# Patient Record
Sex: FEMALE | Race: WHITE | NOT HISPANIC OR LATINO | Employment: UNEMPLOYED | URBAN - NONMETROPOLITAN AREA
[De-identification: names, ages, dates, MRNs, and addresses within clinical notes are randomized per-mention and may not be internally consistent; named-entity substitution may affect disease eponyms.]

---

## 2022-07-22 ENCOUNTER — HOSPITAL ENCOUNTER (INPATIENT)
Facility: HOSPITAL | Age: 72
LOS: 7 days | Discharge: NURSING, REHAB OR LONG-TERM CARE | End: 2022-07-30
Attending: STUDENT IN AN ORGANIZED HEALTH CARE EDUCATION/TRAINING PROGRAM | Admitting: HOSPITALIST

## 2022-07-22 DIAGNOSIS — M46.28 OSTEOMYELITIS OF SACRUM: ICD-10-CM

## 2022-07-22 DIAGNOSIS — L89.154 PRESSURE INJURY OF SACRAL REGION, STAGE 4: ICD-10-CM

## 2022-07-22 DIAGNOSIS — L89.94 PRESSURE INJURY, STAGE 4, WITH INFECTION: ICD-10-CM

## 2022-07-22 DIAGNOSIS — A41.9 SEPSIS, DUE TO UNSPECIFIED ORGANISM, UNSPECIFIED WHETHER ACUTE ORGAN DYSFUNCTION PRESENT: Primary | ICD-10-CM

## 2022-07-22 DIAGNOSIS — L08.9 PRESSURE INJURY, STAGE 4, WITH INFECTION: ICD-10-CM

## 2022-07-22 LAB
ERYTHROCYTE [SEDIMENTATION RATE] IN BLOOD: 128 MM/HR (ref 0–30)
FLUAV RNA RESP QL NAA+PROBE: NOT DETECTED
FLUBV RNA RESP QL NAA+PROBE: NOT DETECTED
SARS-COV-2 RNA RESP QL NAA+PROBE: NOT DETECTED

## 2022-07-22 PROCEDURE — 87636 SARSCOV2 & INF A&B AMP PRB: CPT | Performed by: PHYSICIAN ASSISTANT

## 2022-07-22 PROCEDURE — 36415 COLL VENOUS BLD VENIPUNCTURE: CPT

## 2022-07-22 PROCEDURE — 83036 HEMOGLOBIN GLYCOSYLATED A1C: CPT | Performed by: HOSPITALIST

## 2022-07-22 PROCEDURE — 85007 BL SMEAR W/DIFF WBC COUNT: CPT | Performed by: PHYSICIAN ASSISTANT

## 2022-07-22 PROCEDURE — 99283 EMERGENCY DEPT VISIT LOW MDM: CPT

## 2022-07-22 PROCEDURE — 99223 1ST HOSP IP/OBS HIGH 75: CPT | Performed by: HOSPITALIST

## 2022-07-22 PROCEDURE — 85025 COMPLETE CBC W/AUTO DIFF WBC: CPT | Performed by: PHYSICIAN ASSISTANT

## 2022-07-22 PROCEDURE — 80053 COMPREHEN METABOLIC PANEL: CPT | Performed by: PHYSICIAN ASSISTANT

## 2022-07-22 PROCEDURE — 86140 C-REACTIVE PROTEIN: CPT | Performed by: PHYSICIAN ASSISTANT

## 2022-07-22 PROCEDURE — 85652 RBC SED RATE AUTOMATED: CPT | Performed by: PHYSICIAN ASSISTANT

## 2022-07-23 ENCOUNTER — APPOINTMENT (OUTPATIENT)
Dept: CT IMAGING | Facility: HOSPITAL | Age: 72
End: 2022-07-23

## 2022-07-23 ENCOUNTER — ANESTHESIA (OUTPATIENT)
Dept: PERIOP | Facility: HOSPITAL | Age: 72
End: 2022-07-23

## 2022-07-23 ENCOUNTER — ANESTHESIA EVENT (OUTPATIENT)
Dept: PERIOP | Facility: HOSPITAL | Age: 72
End: 2022-07-23

## 2022-07-23 PROBLEM — L89.90 INFECTED DECUBITUS ULCER: Status: ACTIVE | Noted: 2022-07-23

## 2022-07-23 PROBLEM — L08.9 INFECTED DECUBITUS ULCER: Status: ACTIVE | Noted: 2022-07-23

## 2022-07-23 LAB
ALBUMIN SERPL-MCNC: 1.57 G/DL (ref 3.5–5.2)
ALBUMIN SERPL-MCNC: 2.13 G/DL (ref 3.5–5.2)
ALBUMIN/GLOB SERPL: 0.4 G/DL
ALBUMIN/GLOB SERPL: 0.5 G/DL
ALP SERPL-CCNC: 117 U/L (ref 39–117)
ALP SERPL-CCNC: 155 U/L (ref 39–117)
ALT SERPL W P-5'-P-CCNC: 25 U/L (ref 1–33)
ALT SERPL W P-5'-P-CCNC: 31 U/L (ref 1–33)
ANION GAP SERPL CALCULATED.3IONS-SCNC: 13.3 MMOL/L (ref 5–15)
ANION GAP SERPL CALCULATED.3IONS-SCNC: 9.6 MMOL/L (ref 5–15)
AST SERPL-CCNC: 38 U/L (ref 1–32)
AST SERPL-CCNC: 54 U/L (ref 1–32)
BACTERIA UR QL AUTO: ABNORMAL /HPF
BASOPHILS # BLD AUTO: 0.1 10*3/MM3 (ref 0–0.2)
BASOPHILS NFR BLD AUTO: 0.5 % (ref 0–1.5)
BILIRUB SERPL-MCNC: 0.7 MG/DL (ref 0–1.2)
BILIRUB SERPL-MCNC: 0.8 MG/DL (ref 0–1.2)
BILIRUB UR QL STRIP: NEGATIVE
BUN SERPL-MCNC: 27 MG/DL (ref 8–23)
BUN SERPL-MCNC: 28 MG/DL (ref 8–23)
BUN/CREAT SERPL: 32.1 (ref 7–25)
BUN/CREAT SERPL: 35 (ref 7–25)
CALCIUM SPEC-SCNC: 8.2 MG/DL (ref 8.6–10.5)
CALCIUM SPEC-SCNC: 9.4 MG/DL (ref 8.6–10.5)
CHLORIDE SERPL-SCNC: 101 MMOL/L (ref 98–107)
CHLORIDE SERPL-SCNC: 95 MMOL/L (ref 98–107)
CLARITY UR: ABNORMAL
CO2 SERPL-SCNC: 19.4 MMOL/L (ref 22–29)
CO2 SERPL-SCNC: 19.7 MMOL/L (ref 22–29)
COLOR UR: ABNORMAL
CORTIS SERPL-MCNC: 18.82 MCG/DL
CREAT SERPL-MCNC: 0.8 MG/DL (ref 0.57–1)
CREAT SERPL-MCNC: 0.84 MG/DL (ref 0.57–1)
CRP SERPL-MCNC: 13.58 MG/DL (ref 0–0.5)
CRP SERPL-MCNC: 18.07 MG/DL (ref 0–0.5)
D-LACTATE SERPL-SCNC: 1.8 MMOL/L (ref 0.5–2)
D-LACTATE SERPL-SCNC: 2.4 MMOL/L (ref 0.5–2)
DEPRECATED RDW RBC AUTO: 45.6 FL (ref 37–54)
DEPRECATED RDW RBC AUTO: 49.6 FL (ref 37–54)
EGFRCR SERPLBLD CKD-EPI 2021: 74.9 ML/MIN/1.73
EGFRCR SERPLBLD CKD-EPI 2021: 79.4 ML/MIN/1.73
EOSINOPHIL # BLD AUTO: 0.04 10*3/MM3 (ref 0–0.4)
EOSINOPHIL NFR BLD AUTO: 0.2 % (ref 0.3–6.2)
ERYTHROCYTE [DISTWIDTH] IN BLOOD BY AUTOMATED COUNT: 13.4 % (ref 12.3–15.4)
ERYTHROCYTE [DISTWIDTH] IN BLOOD BY AUTOMATED COUNT: 13.5 % (ref 12.3–15.4)
GLOBULIN UR ELPH-MCNC: 3.6 GM/DL
GLOBULIN UR ELPH-MCNC: 4.7 GM/DL
GLUCOSE BLDC GLUCOMTR-MCNC: 123 MG/DL (ref 70–130)
GLUCOSE BLDC GLUCOMTR-MCNC: 125 MG/DL (ref 70–130)
GLUCOSE BLDC GLUCOMTR-MCNC: 172 MG/DL (ref 70–130)
GLUCOSE BLDC GLUCOMTR-MCNC: 248 MG/DL (ref 70–130)
GLUCOSE SERPL-MCNC: 253 MG/DL (ref 65–99)
GLUCOSE SERPL-MCNC: 257 MG/DL (ref 65–99)
GLUCOSE UR STRIP-MCNC: ABNORMAL MG/DL
HBA1C MFR BLD: 7.2 % (ref 4.8–5.6)
HCT VFR BLD AUTO: 30.5 % (ref 34–46.6)
HCT VFR BLD AUTO: 32.9 % (ref 34–46.6)
HGB BLD-MCNC: 10.7 G/DL (ref 12–15.9)
HGB BLD-MCNC: 9.1 G/DL (ref 12–15.9)
HGB UR QL STRIP.AUTO: ABNORMAL
HOLD SPECIMEN: NORMAL
HOLD SPECIMEN: NORMAL
HYALINE CASTS UR QL AUTO: ABNORMAL /LPF
HYPOCHROMIA BLD QL: ABNORMAL
IMM GRANULOCYTES # BLD AUTO: 0.46 10*3/MM3 (ref 0–0.05)
IMM GRANULOCYTES NFR BLD AUTO: 2.2 % (ref 0–0.5)
KETONES UR QL STRIP: ABNORMAL
LARGE PLATELETS: ABNORMAL
LEUKOCYTE ESTERASE UR QL STRIP.AUTO: ABNORMAL
LYMPHOCYTES # BLD AUTO: 1.99 10*3/MM3 (ref 0.7–3.1)
LYMPHOCYTES # BLD MANUAL: 2.43 10*3/MM3 (ref 0.7–3.1)
LYMPHOCYTES NFR BLD AUTO: 9.4 % (ref 19.6–45.3)
LYMPHOCYTES NFR BLD MANUAL: 2 % (ref 5–12)
MCH RBC QN AUTO: 29.8 PG (ref 26.6–33)
MCH RBC QN AUTO: 30.4 PG (ref 26.6–33)
MCHC RBC AUTO-ENTMCNC: 29.8 G/DL (ref 31.5–35.7)
MCHC RBC AUTO-ENTMCNC: 32.5 G/DL (ref 31.5–35.7)
MCV RBC AUTO: 100 FL (ref 79–97)
MCV RBC AUTO: 93.5 FL (ref 79–97)
METAMYELOCYTES NFR BLD MANUAL: 1 % (ref 0–0)
MONOCYTES # BLD AUTO: 1.1 10*3/MM3 (ref 0.1–0.9)
MONOCYTES # BLD: 0.54 10*3/MM3 (ref 0.1–0.9)
MONOCYTES NFR BLD AUTO: 5.2 % (ref 5–12)
NEUTROPHILS # BLD AUTO: 23.72 10*3/MM3 (ref 1.7–7)
NEUTROPHILS NFR BLD AUTO: 17.42 10*3/MM3 (ref 1.7–7)
NEUTROPHILS NFR BLD AUTO: 82.5 % (ref 42.7–76)
NEUTROPHILS NFR BLD MANUAL: 77 % (ref 42.7–76)
NEUTS BAND NFR BLD MANUAL: 11 % (ref 0–5)
NEUTS VAC BLD QL SMEAR: ABNORMAL
NITRITE UR QL STRIP: POSITIVE
NRBC BLD AUTO-RTO: 0 /100 WBC (ref 0–0.2)
NRBC SPEC MANUAL: 2 /100 WBC (ref 0–0.2)
PH UR STRIP.AUTO: <=5 [PH] (ref 5–8)
PLATELET # BLD AUTO: 370 10*3/MM3 (ref 140–450)
PLATELET # BLD AUTO: 541 10*3/MM3 (ref 140–450)
PMV BLD AUTO: 10.5 FL (ref 6–12)
PMV BLD AUTO: 10.6 FL (ref 6–12)
POLYCHROMASIA BLD QL SMEAR: ABNORMAL
POTASSIUM SERPL-SCNC: 4.8 MMOL/L (ref 3.5–5.2)
POTASSIUM SERPL-SCNC: 5.4 MMOL/L (ref 3.5–5.2)
PROT SERPL-MCNC: 5.2 G/DL (ref 6–8.5)
PROT SERPL-MCNC: 6.8 G/DL (ref 6–8.5)
PROT UR QL STRIP: ABNORMAL
RBC # BLD AUTO: 3.05 10*6/MM3 (ref 3.77–5.28)
RBC # BLD AUTO: 3.52 10*6/MM3 (ref 3.77–5.28)
RBC # UR STRIP: ABNORMAL /HPF
REF LAB TEST METHOD: ABNORMAL
SCAN SLIDE: NORMAL
SODIUM SERPL-SCNC: 128 MMOL/L (ref 136–145)
SODIUM SERPL-SCNC: 130 MMOL/L (ref 136–145)
SP GR UR STRIP: 1.02 (ref 1–1.03)
SQUAMOUS #/AREA URNS HPF: ABNORMAL /HPF
TOXIC GRANULATION: ABNORMAL
UROBILINOGEN UR QL STRIP: ABNORMAL
VARIANT LYMPHS NFR BLD MANUAL: 9 % (ref 19.6–45.3)
WBC # UR STRIP: ABNORMAL /HPF
WBC NRBC COR # BLD: 21.11 10*3/MM3 (ref 3.4–10.8)
WBC NRBC COR # BLD: 26.95 10*3/MM3 (ref 3.4–10.8)
WHOLE BLOOD HOLD COAG: NORMAL
WHOLE BLOOD HOLD SPECIMEN: NORMAL

## 2022-07-23 PROCEDURE — 87070 CULTURE OTHR SPECIMN AEROBIC: CPT | Performed by: SURGERY

## 2022-07-23 PROCEDURE — 74176 CT ABD & PELVIS W/O CONTRAST: CPT

## 2022-07-23 PROCEDURE — 25010000002 PIPERACILLIN SOD-TAZOBACTAM PER 1 G: Performed by: HOSPITALIST

## 2022-07-23 PROCEDURE — 11043 DBRDMT MUSC&/FSCA 1ST 20/<: CPT | Performed by: SURGERY

## 2022-07-23 PROCEDURE — 25010000002 VANCOMYCIN 5 G RECONSTITUTED SOLUTION: Performed by: STUDENT IN AN ORGANIZED HEALTH CARE EDUCATION/TRAINING PROGRAM

## 2022-07-23 PROCEDURE — 25010000002 VANCOMYCIN 5 G RECONSTITUTED SOLUTION: Performed by: SURGERY

## 2022-07-23 PROCEDURE — 87205 SMEAR GRAM STAIN: CPT | Performed by: SURGERY

## 2022-07-23 PROCEDURE — 0JB70ZZ EXCISION OF BACK SUBCUTANEOUS TISSUE AND FASCIA, OPEN APPROACH: ICD-10-PCS | Performed by: SURGERY

## 2022-07-23 PROCEDURE — 99222 1ST HOSP IP/OBS MODERATE 55: CPT | Performed by: NURSE PRACTITIONER

## 2022-07-23 PROCEDURE — 25010000002 CEFEPIME PER 500 MG: Performed by: SURGERY

## 2022-07-23 PROCEDURE — 85025 COMPLETE CBC W/AUTO DIFF WBC: CPT | Performed by: HOSPITALIST

## 2022-07-23 PROCEDURE — 83605 ASSAY OF LACTIC ACID: CPT | Performed by: PHYSICIAN ASSISTANT

## 2022-07-23 PROCEDURE — 25010000002 HEPARIN (PORCINE) PER 1000 UNITS: Performed by: SURGERY

## 2022-07-23 PROCEDURE — 81001 URINALYSIS AUTO W/SCOPE: CPT | Performed by: PHYSICIAN ASSISTANT

## 2022-07-23 PROCEDURE — 87086 URINE CULTURE/COLONY COUNT: CPT | Performed by: PHYSICIAN ASSISTANT

## 2022-07-23 PROCEDURE — 82962 GLUCOSE BLOOD TEST: CPT

## 2022-07-23 PROCEDURE — 25010000002 PIPERACILLIN SOD-TAZOBACTAM PER 1 G: Performed by: STUDENT IN AN ORGANIZED HEALTH CARE EDUCATION/TRAINING PROGRAM

## 2022-07-23 PROCEDURE — 87186 SC STD MICRODIL/AGAR DIL: CPT | Performed by: PHYSICIAN ASSISTANT

## 2022-07-23 PROCEDURE — 25010000002 HEPARIN (PORCINE) PER 1000 UNITS: Performed by: HOSPITALIST

## 2022-07-23 PROCEDURE — 99231 SBSQ HOSP IP/OBS SF/LOW 25: CPT | Performed by: SURGERY

## 2022-07-23 PROCEDURE — 86140 C-REACTIVE PROTEIN: CPT | Performed by: HOSPITALIST

## 2022-07-23 PROCEDURE — 87075 CULTR BACTERIA EXCEPT BLOOD: CPT | Performed by: SURGERY

## 2022-07-23 PROCEDURE — 63710000001 INSULIN ASPART PER 5 UNITS: Performed by: HOSPITALIST

## 2022-07-23 PROCEDURE — 80053 COMPREHEN METABOLIC PANEL: CPT | Performed by: HOSPITALIST

## 2022-07-23 PROCEDURE — 87040 BLOOD CULTURE FOR BACTERIA: CPT | Performed by: PHYSICIAN ASSISTANT

## 2022-07-23 PROCEDURE — 87076 CULTURE ANAEROBE IDENT EACH: CPT | Performed by: SURGERY

## 2022-07-23 PROCEDURE — 82533 TOTAL CORTISOL: CPT | Performed by: HOSPITALIST

## 2022-07-23 PROCEDURE — 87186 SC STD MICRODIL/AGAR DIL: CPT | Performed by: SURGERY

## 2022-07-23 PROCEDURE — 11046 DBRDMT MUSC&/FSCA EA ADDL: CPT | Performed by: SURGERY

## 2022-07-23 PROCEDURE — 25010000002 PROPOFOL 10 MG/ML EMULSION: Performed by: NURSE ANESTHETIST, CERTIFIED REGISTERED

## 2022-07-23 PROCEDURE — 72131 CT LUMBAR SPINE W/O DYE: CPT

## 2022-07-23 RX ORDER — IPRATROPIUM BROMIDE AND ALBUTEROL SULFATE 2.5; .5 MG/3ML; MG/3ML
3 SOLUTION RESPIRATORY (INHALATION) ONCE AS NEEDED
Status: DISCONTINUED | OUTPATIENT
Start: 2022-07-23 | End: 2022-07-23 | Stop reason: HOSPADM

## 2022-07-23 RX ORDER — ACETAMINOPHEN 500 MG
1000 TABLET ORAL ONCE
Status: COMPLETED | OUTPATIENT
Start: 2022-07-23 | End: 2022-07-23

## 2022-07-23 RX ORDER — CLINDAMYCIN PHOSPHATE 600 MG/50ML
600 INJECTION INTRAVENOUS EVERY 8 HOURS
Status: DISCONTINUED | OUTPATIENT
Start: 2022-07-23 | End: 2022-07-25

## 2022-07-23 RX ORDER — HEPARIN SODIUM 5000 [USP'U]/ML
5000 INJECTION, SOLUTION INTRAVENOUS; SUBCUTANEOUS EVERY 12 HOURS SCHEDULED
Status: DISCONTINUED | OUTPATIENT
Start: 2022-07-23 | End: 2022-07-30 | Stop reason: HOSPADM

## 2022-07-23 RX ORDER — SODIUM HYPOCHLORITE 5 MG/ML
20 SOLUTION TOPICAL 2 TIMES DAILY PRN
Status: DISCONTINUED | OUTPATIENT
Start: 2022-07-23 | End: 2022-07-30 | Stop reason: HOSPADM

## 2022-07-23 RX ORDER — SODIUM CHLORIDE 0.9 % (FLUSH) 0.9 %
10 SYRINGE (ML) INJECTION AS NEEDED
Status: DISCONTINUED | OUTPATIENT
Start: 2022-07-23 | End: 2022-07-30 | Stop reason: HOSPADM

## 2022-07-23 RX ORDER — NICOTINE POLACRILEX 4 MG
15 LOZENGE BUCCAL
Status: DISCONTINUED | OUTPATIENT
Start: 2022-07-23 | End: 2022-07-30 | Stop reason: HOSPADM

## 2022-07-23 RX ORDER — SODIUM HYPOCHLORITE 5 MG/ML
20 SOLUTION TOPICAL EVERY 12 HOURS SCHEDULED
Status: DISCONTINUED | OUTPATIENT
Start: 2022-07-23 | End: 2022-07-30 | Stop reason: HOSPADM

## 2022-07-23 RX ORDER — SODIUM CHLORIDE 0.9 % (FLUSH) 0.9 %
10 SYRINGE (ML) INJECTION EVERY 12 HOURS SCHEDULED
Status: DISCONTINUED | OUTPATIENT
Start: 2022-07-23 | End: 2022-07-30 | Stop reason: HOSPADM

## 2022-07-23 RX ORDER — INSULIN ASPART 100 [IU]/ML
0-9 INJECTION, SOLUTION INTRAVENOUS; SUBCUTANEOUS
Status: DISCONTINUED | OUTPATIENT
Start: 2022-07-23 | End: 2022-07-30 | Stop reason: HOSPADM

## 2022-07-23 RX ORDER — MAGNESIUM HYDROXIDE 1200 MG/15ML
LIQUID ORAL AS NEEDED
Status: DISCONTINUED | OUTPATIENT
Start: 2022-07-23 | End: 2022-07-23 | Stop reason: HOSPADM

## 2022-07-23 RX ORDER — PROPOFOL 10 MG/ML
VIAL (ML) INTRAVENOUS CONTINUOUS PRN
Status: DISCONTINUED | OUTPATIENT
Start: 2022-07-23 | End: 2022-07-23 | Stop reason: SURG

## 2022-07-23 RX ORDER — MIDAZOLAM HYDROCHLORIDE 1 MG/ML
0.5 INJECTION INTRAMUSCULAR; INTRAVENOUS
Status: DISCONTINUED | OUTPATIENT
Start: 2022-07-23 | End: 2022-07-23 | Stop reason: HOSPADM

## 2022-07-23 RX ORDER — OXYCODONE HYDROCHLORIDE AND ACETAMINOPHEN 5; 325 MG/1; MG/1
1 TABLET ORAL ONCE AS NEEDED
Status: DISCONTINUED | OUTPATIENT
Start: 2022-07-23 | End: 2022-07-23 | Stop reason: HOSPADM

## 2022-07-23 RX ORDER — SODIUM CHLORIDE 0.9 % (FLUSH) 0.9 %
10 SYRINGE (ML) INJECTION EVERY 12 HOURS SCHEDULED
Status: DISCONTINUED | OUTPATIENT
Start: 2022-07-23 | End: 2022-07-23 | Stop reason: HOSPADM

## 2022-07-23 RX ORDER — SODIUM CHLORIDE 9 MG/ML
75 INJECTION, SOLUTION INTRAVENOUS CONTINUOUS
Status: DISCONTINUED | OUTPATIENT
Start: 2022-07-23 | End: 2022-07-25

## 2022-07-23 RX ORDER — ONDANSETRON 2 MG/ML
4 INJECTION INTRAMUSCULAR; INTRAVENOUS AS NEEDED
Status: DISCONTINUED | OUTPATIENT
Start: 2022-07-23 | End: 2022-07-23 | Stop reason: HOSPADM

## 2022-07-23 RX ORDER — SODIUM CHLORIDE 0.9 % (FLUSH) 0.9 %
10 SYRINGE (ML) INJECTION AS NEEDED
Status: DISCONTINUED | OUTPATIENT
Start: 2022-07-23 | End: 2022-07-23 | Stop reason: HOSPADM

## 2022-07-23 RX ORDER — SODIUM CHLORIDE, SODIUM LACTATE, POTASSIUM CHLORIDE, CALCIUM CHLORIDE 600; 310; 30; 20 MG/100ML; MG/100ML; MG/100ML; MG/100ML
100 INJECTION, SOLUTION INTRAVENOUS ONCE AS NEEDED
Status: DISCONTINUED | OUTPATIENT
Start: 2022-07-23 | End: 2022-07-23 | Stop reason: HOSPADM

## 2022-07-23 RX ORDER — FENTANYL CITRATE 50 UG/ML
50 INJECTION, SOLUTION INTRAMUSCULAR; INTRAVENOUS
Status: DISCONTINUED | OUTPATIENT
Start: 2022-07-23 | End: 2022-07-23 | Stop reason: HOSPADM

## 2022-07-23 RX ORDER — SODIUM HYPOCHLORITE 1.25 MG/ML
SOLUTION TOPICAL AS NEEDED
Status: DISCONTINUED | OUTPATIENT
Start: 2022-07-23 | End: 2022-07-23 | Stop reason: HOSPADM

## 2022-07-23 RX ORDER — DEXTROSE MONOHYDRATE 25 G/50ML
25 INJECTION, SOLUTION INTRAVENOUS
Status: DISCONTINUED | OUTPATIENT
Start: 2022-07-23 | End: 2022-07-30 | Stop reason: HOSPADM

## 2022-07-23 RX ORDER — SODIUM CHLORIDE, SODIUM LACTATE, POTASSIUM CHLORIDE, CALCIUM CHLORIDE 600; 310; 30; 20 MG/100ML; MG/100ML; MG/100ML; MG/100ML
125 INJECTION, SOLUTION INTRAVENOUS ONCE
Status: DISCONTINUED | OUTPATIENT
Start: 2022-07-23 | End: 2022-07-23 | Stop reason: HOSPADM

## 2022-07-23 RX ADMIN — VANCOMYCIN HYDROCHLORIDE 1250 MG: 5 INJECTION, POWDER, LYOPHILIZED, FOR SOLUTION INTRAVENOUS at 02:50

## 2022-07-23 RX ADMIN — HEPARIN SODIUM 5000 UNITS: 5000 INJECTION INTRAVENOUS; SUBCUTANEOUS at 08:21

## 2022-07-23 RX ADMIN — Medication 10 ML: at 21:00

## 2022-07-23 RX ADMIN — VANCOMYCIN HYDROCHLORIDE 750 MG: 5 INJECTION, POWDER, LYOPHILIZED, FOR SOLUTION INTRAVENOUS at 17:04

## 2022-07-23 RX ADMIN — PIPERACILLIN SODIUM AND TAZOBACTAM SODIUM 3.38 G: 3; .375 INJECTION, POWDER, LYOPHILIZED, FOR SOLUTION INTRAVENOUS at 08:20

## 2022-07-23 RX ADMIN — ACETAMINOPHEN 1000 MG: 500 TABLET ORAL at 01:10

## 2022-07-23 RX ADMIN — PROPOFOL 50 MCG/KG/MIN: 10 INJECTION, EMULSION INTRAVENOUS at 12:33

## 2022-07-23 RX ADMIN — CEFEPIME HYDROCHLORIDE 2 G: 2 INJECTION, POWDER, FOR SOLUTION INTRAVENOUS at 21:15

## 2022-07-23 RX ADMIN — Medication 10 ML: at 08:21

## 2022-07-23 RX ADMIN — SODIUM CHLORIDE 100 ML/HR: 9 INJECTION, SOLUTION INTRAVENOUS at 06:44

## 2022-07-23 RX ADMIN — SODIUM CHLORIDE 100 ML/HR: 9 INJECTION, SOLUTION INTRAVENOUS at 21:15

## 2022-07-23 RX ADMIN — CEFEPIME HYDROCHLORIDE 2 G: 2 INJECTION, POWDER, FOR SOLUTION INTRAVENOUS at 15:11

## 2022-07-23 RX ADMIN — CLINDAMYCIN IN 5 PERCENT DEXTROSE 600 MG: 12 INJECTION, SOLUTION INTRAVENOUS at 15:57

## 2022-07-23 RX ADMIN — SODIUM CHLORIDE 2000 ML: 9 INJECTION, SOLUTION INTRAVENOUS at 01:16

## 2022-07-23 RX ADMIN — INSULIN ASPART 2 UNITS: 100 INJECTION, SOLUTION INTRAVENOUS; SUBCUTANEOUS at 11:03

## 2022-07-23 RX ADMIN — PIPERACILLIN AND TAZOBACTAM 3.38 G: 3; .375 INJECTION, POWDER, FOR SOLUTION INTRAVENOUS at 02:00

## 2022-07-23 RX ADMIN — HEPARIN SODIUM 5000 UNITS: 5000 INJECTION INTRAVENOUS; SUBCUTANEOUS at 21:35

## 2022-07-23 RX ADMIN — INSULIN ASPART 4 UNITS: 100 INJECTION, SOLUTION INTRAVENOUS; SUBCUTANEOUS at 08:21

## 2022-07-23 NOTE — ANESTHESIA PREPROCEDURE EVALUATION
Anesthesia Evaluation     no history of anesthetic complications:  NPO Solid Status: > 8 hours  NPO Liquid Status: > 8 hours           Airway   Mallampati: II  TM distance: >3 FB  Neck ROM: full  No difficulty expected  Dental - normal exam   (+) poor dentition        Pulmonary - normal exam   Cardiovascular - normal exam        Neuro/Psych  (+) CVA, dementia,    (-) psychiatric history  GI/Hepatic/Renal/Endo      Musculoskeletal     Abdominal  - normal exam    Bowel sounds: normal.   Substance History      OB/GYN          Other        ROS/Med Hx Other: sepsis                  Anesthesia Plan    ASA 4     general     intravenous induction     Anesthetic plan, risks, benefits, and alternatives have been provided, discussed and informed consent has been obtained with: patient.        CODE STATUS:    Level Of Support Discussed With: Patient  Code Status (Patient has no pulse and is not breathing): CPR (Attempt to Resuscitate)  Medical Interventions (Patient has pulse or is breathing): Full Support

## 2022-07-24 LAB
GLUCOSE BLDC GLUCOMTR-MCNC: 115 MG/DL (ref 70–130)
GLUCOSE BLDC GLUCOMTR-MCNC: 123 MG/DL (ref 70–130)
GLUCOSE BLDC GLUCOMTR-MCNC: 234 MG/DL (ref 70–130)
GLUCOSE BLDC GLUCOMTR-MCNC: 248 MG/DL (ref 70–130)
VANCOMYCIN TROUGH SERPL-MCNC: 15 MCG/ML (ref 5–20)

## 2022-07-24 PROCEDURE — 99231 SBSQ HOSP IP/OBS SF/LOW 25: CPT | Performed by: SURGERY

## 2022-07-24 PROCEDURE — 82962 GLUCOSE BLOOD TEST: CPT

## 2022-07-24 PROCEDURE — 25010000002 HEPARIN (PORCINE) PER 1000 UNITS: Performed by: SURGERY

## 2022-07-24 PROCEDURE — 80202 ASSAY OF VANCOMYCIN: CPT | Performed by: SURGERY

## 2022-07-24 PROCEDURE — 25010000002 VANCOMYCIN 5 G RECONSTITUTED SOLUTION: Performed by: INTERNAL MEDICINE

## 2022-07-24 PROCEDURE — 25010000002 CEFEPIME PER 500 MG: Performed by: SURGERY

## 2022-07-24 PROCEDURE — 99232 SBSQ HOSP IP/OBS MODERATE 35: CPT | Performed by: NURSE PRACTITIONER

## 2022-07-24 PROCEDURE — 63710000001 INSULIN ASPART PER 5 UNITS: Performed by: SURGERY

## 2022-07-24 PROCEDURE — 99233 SBSQ HOSP IP/OBS HIGH 50: CPT | Performed by: INTERNAL MEDICINE

## 2022-07-24 RX ORDER — ROPINIROLE 0.25 MG/1
0.5 TABLET, FILM COATED ORAL NIGHTLY
Status: DISCONTINUED | OUTPATIENT
Start: 2022-07-24 | End: 2022-07-30 | Stop reason: HOSPADM

## 2022-07-24 RX ORDER — DOCUSATE SODIUM 100 MG/1
100 CAPSULE, LIQUID FILLED ORAL 2 TIMES DAILY
Status: CANCELLED | OUTPATIENT
Start: 2022-07-24

## 2022-07-24 RX ORDER — INSULIN GLARGINE 100 [IU]/ML
30 INJECTION, SOLUTION SUBCUTANEOUS NIGHTLY
COMMUNITY
End: 2022-08-18 | Stop reason: HOSPADM

## 2022-07-24 RX ORDER — ATORVASTATIN CALCIUM 40 MG/1
40 TABLET, FILM COATED ORAL NIGHTLY
COMMUNITY

## 2022-07-24 RX ORDER — ATORVASTATIN CALCIUM 40 MG/1
40 TABLET, FILM COATED ORAL NIGHTLY
Status: DISCONTINUED | OUTPATIENT
Start: 2022-07-24 | End: 2022-07-30 | Stop reason: HOSPADM

## 2022-07-24 RX ORDER — DONEPEZIL HYDROCHLORIDE 5 MG/1
10 TABLET, FILM COATED ORAL NIGHTLY
Status: DISCONTINUED | OUTPATIENT
Start: 2022-07-24 | End: 2022-07-30 | Stop reason: HOSPADM

## 2022-07-24 RX ORDER — LEVOTHYROXINE SODIUM 0.03 MG/1
25 TABLET ORAL EVERY MORNING
Status: DISCONTINUED | OUTPATIENT
Start: 2022-07-24 | End: 2022-07-30 | Stop reason: HOSPADM

## 2022-07-24 RX ORDER — INSULIN ASPART 100 [IU]/ML
6 INJECTION, SOLUTION INTRAVENOUS; SUBCUTANEOUS
Status: CANCELLED | OUTPATIENT
Start: 2022-07-24

## 2022-07-24 RX ORDER — INSULIN LISPRO 100 [IU]/ML
6 INJECTION, SOLUTION INTRAVENOUS; SUBCUTANEOUS
COMMUNITY
End: 2022-08-18 | Stop reason: HOSPADM

## 2022-07-24 RX ORDER — ISOSORBIDE MONONITRATE 30 MG/1
30 TABLET, EXTENDED RELEASE ORAL DAILY
COMMUNITY

## 2022-07-24 RX ORDER — ASPIRIN 81 MG/1
81 TABLET ORAL DAILY
COMMUNITY

## 2022-07-24 RX ORDER — LEVOTHYROXINE SODIUM 0.03 MG/1
25 TABLET ORAL DAILY
COMMUNITY

## 2022-07-24 RX ORDER — DOCUSATE SODIUM 100 MG/1
100 CAPSULE, LIQUID FILLED ORAL 2 TIMES DAILY
COMMUNITY

## 2022-07-24 RX ORDER — ROPINIROLE 0.5 MG/1
0.5 TABLET, FILM COATED ORAL NIGHTLY
COMMUNITY

## 2022-07-24 RX ORDER — DONEPEZIL HYDROCHLORIDE 10 MG/1
10 TABLET, FILM COATED ORAL NIGHTLY
COMMUNITY

## 2022-07-24 RX ORDER — GABAPENTIN 300 MG/1
300 CAPSULE ORAL 2 TIMES DAILY
COMMUNITY
End: 2022-08-18 | Stop reason: HOSPADM

## 2022-07-24 RX ORDER — AMLODIPINE BESYLATE 10 MG/1
10 TABLET ORAL DAILY
COMMUNITY
End: 2022-08-18 | Stop reason: HOSPADM

## 2022-07-24 RX ORDER — ASPIRIN 81 MG/1
81 TABLET, CHEWABLE ORAL DAILY
Status: DISCONTINUED | OUTPATIENT
Start: 2022-07-24 | End: 2022-07-30 | Stop reason: HOSPADM

## 2022-07-24 RX ORDER — GABAPENTIN 300 MG/1
300 CAPSULE ORAL 2 TIMES DAILY
Status: CANCELLED | OUTPATIENT
Start: 2022-07-24

## 2022-07-24 RX ORDER — AMLODIPINE BESYLATE 10 MG/1
10 TABLET ORAL DAILY
Status: CANCELLED | OUTPATIENT
Start: 2022-07-24

## 2022-07-24 RX ORDER — ISOSORBIDE MONONITRATE 30 MG/1
30 TABLET, EXTENDED RELEASE ORAL DAILY
Status: CANCELLED | OUTPATIENT
Start: 2022-07-24

## 2022-07-24 RX ADMIN — ASPIRIN 81 MG: 81 TABLET, CHEWABLE ORAL at 18:02

## 2022-07-24 RX ADMIN — Medication 20 ML: at 00:33

## 2022-07-24 RX ADMIN — HYDROCODONE BITARTRATE AND ACETAMINOPHEN 10 ML: 7.5; 325 SOLUTION ORAL at 13:04

## 2022-07-24 RX ADMIN — HEPARIN SODIUM 5000 UNITS: 5000 INJECTION INTRAVENOUS; SUBCUTANEOUS at 21:32

## 2022-07-24 RX ADMIN — Medication 10 ML: at 21:22

## 2022-07-24 RX ADMIN — INSULIN ASPART 4 UNITS: 100 INJECTION, SOLUTION INTRAVENOUS; SUBCUTANEOUS at 17:00

## 2022-07-24 RX ADMIN — VANCOMYCIN HYDROCHLORIDE 1250 MG: 5 INJECTION, POWDER, LYOPHILIZED, FOR SOLUTION INTRAVENOUS at 11:31

## 2022-07-24 RX ADMIN — DONEPEZIL HYDROCHLORIDE 10 MG: 5 TABLET, FILM COATED ORAL at 21:32

## 2022-07-24 RX ADMIN — ROPINIROLE HYDROCHLORIDE 0.5 MG: 0.25 TABLET, FILM COATED ORAL at 21:32

## 2022-07-24 RX ADMIN — Medication 20 ML: at 21:23

## 2022-07-24 RX ADMIN — CEFEPIME HYDROCHLORIDE 2 G: 2 INJECTION, POWDER, FOR SOLUTION INTRAVENOUS at 21:23

## 2022-07-24 RX ADMIN — LEVOTHYROXINE SODIUM 25 MCG: 25 TABLET ORAL at 18:02

## 2022-07-24 RX ADMIN — CLINDAMYCIN IN 5 PERCENT DEXTROSE 600 MG: 12 INJECTION, SOLUTION INTRAVENOUS at 14:08

## 2022-07-24 RX ADMIN — HEPARIN SODIUM 5000 UNITS: 5000 INJECTION INTRAVENOUS; SUBCUTANEOUS at 08:04

## 2022-07-24 RX ADMIN — CLINDAMYCIN IN 5 PERCENT DEXTROSE 600 MG: 12 INJECTION, SOLUTION INTRAVENOUS at 08:04

## 2022-07-24 RX ADMIN — Medication 10 ML: at 08:04

## 2022-07-24 RX ADMIN — CEFEPIME HYDROCHLORIDE 2 G: 2 INJECTION, POWDER, FOR SOLUTION INTRAVENOUS at 09:04

## 2022-07-24 RX ADMIN — ATORVASTATIN CALCIUM 40 MG: 40 TABLET, FILM COATED ORAL at 21:32

## 2022-07-24 RX ADMIN — Medication 20 ML: at 10:35

## 2022-07-24 RX ADMIN — Medication 5000 UNITS: at 18:02

## 2022-07-25 ENCOUNTER — APPOINTMENT (OUTPATIENT)
Dept: MRI IMAGING | Facility: HOSPITAL | Age: 72
End: 2022-07-25

## 2022-07-25 LAB
ANION GAP SERPL CALCULATED.3IONS-SCNC: 10 MMOL/L (ref 5–15)
BACTERIA SPEC AEROBE CULT: ABNORMAL
BUN SERPL-MCNC: 22 MG/DL (ref 8–23)
BUN/CREAT SERPL: 34.4 (ref 7–25)
CALCIUM SPEC-SCNC: 8.5 MG/DL (ref 8.6–10.5)
CHLORIDE SERPL-SCNC: 105 MMOL/L (ref 98–107)
CO2 SERPL-SCNC: 20 MMOL/L (ref 22–29)
CREAT SERPL-MCNC: 0.64 MG/DL (ref 0.57–1)
EGFRCR SERPLBLD CKD-EPI 2021: 95.2 ML/MIN/1.73
GLUCOSE BLDC GLUCOMTR-MCNC: 127 MG/DL (ref 70–130)
GLUCOSE BLDC GLUCOMTR-MCNC: 174 MG/DL (ref 70–130)
GLUCOSE BLDC GLUCOMTR-MCNC: 268 MG/DL (ref 70–130)
GLUCOSE SERPL-MCNC: 262 MG/DL (ref 65–99)
GRAM STN SPEC: ABNORMAL
MAGNESIUM SERPL-MCNC: 2 MG/DL (ref 1.6–2.4)
POTASSIUM SERPL-SCNC: 3.7 MMOL/L (ref 3.5–5.2)
POTASSIUM SERPL-SCNC: 3.8 MMOL/L (ref 3.5–5.2)
SODIUM SERPL-SCNC: 135 MMOL/L (ref 136–145)

## 2022-07-25 PROCEDURE — 80048 BASIC METABOLIC PNL TOTAL CA: CPT | Performed by: STUDENT IN AN ORGANIZED HEALTH CARE EDUCATION/TRAINING PROGRAM

## 2022-07-25 PROCEDURE — 83735 ASSAY OF MAGNESIUM: CPT | Performed by: PHYSICIAN ASSISTANT

## 2022-07-25 PROCEDURE — 99233 SBSQ HOSP IP/OBS HIGH 50: CPT | Performed by: INTERNAL MEDICINE

## 2022-07-25 PROCEDURE — 84132 ASSAY OF SERUM POTASSIUM: CPT | Performed by: PHYSICIAN ASSISTANT

## 2022-07-25 PROCEDURE — C1751 CATH, INF, PER/CENT/MIDLINE: HCPCS

## 2022-07-25 PROCEDURE — 72197 MRI PELVIS W/O & W/DYE: CPT

## 2022-07-25 PROCEDURE — 82962 GLUCOSE BLOOD TEST: CPT

## 2022-07-25 PROCEDURE — 36410 VNPNXR 3YR/> PHY/QHP DX/THER: CPT

## 2022-07-25 PROCEDURE — 25010000002 CEFTRIAXONE PER 250 MG: Performed by: NURSE PRACTITIONER

## 2022-07-25 PROCEDURE — 25010000002 CEFEPIME PER 500 MG: Performed by: SURGERY

## 2022-07-25 PROCEDURE — 72197 MRI PELVIS W/O & W/DYE: CPT | Performed by: RADIOLOGY

## 2022-07-25 PROCEDURE — 99232 SBSQ HOSP IP/OBS MODERATE 35: CPT | Performed by: STUDENT IN AN ORGANIZED HEALTH CARE EDUCATION/TRAINING PROGRAM

## 2022-07-25 PROCEDURE — 25010000002 HEPARIN (PORCINE) PER 1000 UNITS: Performed by: SURGERY

## 2022-07-25 PROCEDURE — A9577 INJ MULTIHANCE: HCPCS | Performed by: STUDENT IN AN ORGANIZED HEALTH CARE EDUCATION/TRAINING PROGRAM

## 2022-07-25 PROCEDURE — 0 GADOBENATE DIMEGLUMINE 529 MG/ML SOLUTION: Performed by: STUDENT IN AN ORGANIZED HEALTH CARE EDUCATION/TRAINING PROGRAM

## 2022-07-25 PROCEDURE — 63710000001 INSULIN ASPART PER 5 UNITS: Performed by: SURGERY

## 2022-07-25 RX ORDER — CASTOR OIL AND BALSAM, PERU 788; 87 MG/G; MG/G
1 OINTMENT TOPICAL EVERY 12 HOURS SCHEDULED
Status: COMPLETED | OUTPATIENT
Start: 2022-07-25 | End: 2022-07-29

## 2022-07-25 RX ADMIN — Medication 5000 UNITS: at 08:38

## 2022-07-25 RX ADMIN — Medication 20 ML: at 08:38

## 2022-07-25 RX ADMIN — INSULIN ASPART 6 UNITS: 100 INJECTION, SOLUTION INTRAVENOUS; SUBCUTANEOUS at 08:38

## 2022-07-25 RX ADMIN — GADOBENATE DIMEGLUMINE 11 ML: 529 INJECTION, SOLUTION INTRAVENOUS at 12:55

## 2022-07-25 RX ADMIN — ATORVASTATIN CALCIUM 40 MG: 40 TABLET, FILM COATED ORAL at 21:01

## 2022-07-25 RX ADMIN — CLINDAMYCIN IN 5 PERCENT DEXTROSE 600 MG: 12 INJECTION, SOLUTION INTRAVENOUS at 10:14

## 2022-07-25 RX ADMIN — Medication 20 ML: at 21:24

## 2022-07-25 RX ADMIN — SODIUM CHLORIDE 75 ML/HR: 9 INJECTION, SOLUTION INTRAVENOUS at 08:38

## 2022-07-25 RX ADMIN — ROPINIROLE HYDROCHLORIDE 0.5 MG: 0.25 TABLET, FILM COATED ORAL at 21:01

## 2022-07-25 RX ADMIN — LEVOTHYROXINE SODIUM 25 MCG: 25 TABLET ORAL at 06:20

## 2022-07-25 RX ADMIN — CLINDAMYCIN IN 5 PERCENT DEXTROSE 600 MG: 12 INJECTION, SOLUTION INTRAVENOUS at 02:57

## 2022-07-25 RX ADMIN — CASTOR OIL AND BALSAM, PERU 1 APPLICATION: 788; 87 OINTMENT TOPICAL at 21:02

## 2022-07-25 RX ADMIN — ASPIRIN 81 MG: 81 TABLET, CHEWABLE ORAL at 08:38

## 2022-07-25 RX ADMIN — CEFEPIME HYDROCHLORIDE 2 G: 2 INJECTION, POWDER, FOR SOLUTION INTRAVENOUS at 10:14

## 2022-07-25 RX ADMIN — HEPARIN SODIUM 5000 UNITS: 5000 INJECTION INTRAVENOUS; SUBCUTANEOUS at 21:02

## 2022-07-25 RX ADMIN — HYDROCODONE BITARTRATE AND ACETAMINOPHEN 10 ML: 7.5; 325 SOLUTION ORAL at 15:28

## 2022-07-25 RX ADMIN — HEPARIN SODIUM 5000 UNITS: 5000 INJECTION INTRAVENOUS; SUBCUTANEOUS at 08:38

## 2022-07-25 RX ADMIN — Medication 10 ML: at 21:02

## 2022-07-25 RX ADMIN — CEFTRIAXONE 2 G: 2 INJECTION, POWDER, FOR SOLUTION INTRAMUSCULAR; INTRAVENOUS at 13:26

## 2022-07-25 RX ADMIN — CASTOR OIL AND BALSAM, PERU 1 APPLICATION: 788; 87 OINTMENT TOPICAL at 15:08

## 2022-07-25 RX ADMIN — DONEPEZIL HYDROCHLORIDE 10 MG: 5 TABLET, FILM COATED ORAL at 21:01

## 2022-07-25 RX ADMIN — INSULIN ASPART 2 UNITS: 100 INJECTION, SOLUTION INTRAVENOUS; SUBCUTANEOUS at 17:00

## 2022-07-26 LAB
DEPRECATED RDW RBC AUTO: 46.5 FL (ref 37–54)
EOSINOPHIL # BLD MANUAL: 0.57 10*3/MM3 (ref 0–0.4)
EOSINOPHIL NFR BLD MANUAL: 4 % (ref 0.3–6.2)
ERYTHROCYTE [DISTWIDTH] IN BLOOD BY AUTOMATED COUNT: 13.7 % (ref 12.3–15.4)
GLUCOSE BLDC GLUCOMTR-MCNC: 163 MG/DL (ref 70–130)
GLUCOSE BLDC GLUCOMTR-MCNC: 279 MG/DL (ref 70–130)
GLUCOSE BLDC GLUCOMTR-MCNC: 302 MG/DL (ref 70–130)
HCT VFR BLD AUTO: 26.1 % (ref 34–46.6)
HGB BLD-MCNC: 8.5 G/DL (ref 12–15.9)
LYMPHOCYTES # BLD MANUAL: 3.11 10*3/MM3 (ref 0.7–3.1)
LYMPHOCYTES NFR BLD MANUAL: 6 % (ref 5–12)
MCH RBC QN AUTO: 30.2 PG (ref 26.6–33)
MCHC RBC AUTO-ENTMCNC: 32.6 G/DL (ref 31.5–35.7)
MCV RBC AUTO: 92.9 FL (ref 79–97)
METAMYELOCYTES NFR BLD MANUAL: 3 % (ref 0–0)
MONOCYTES # BLD: 0.85 10*3/MM3 (ref 0.1–0.9)
MYELOCYTES NFR BLD MANUAL: 3 % (ref 0–0)
NEUTROPHILS # BLD AUTO: 8.77 10*3/MM3 (ref 1.7–7)
NEUTROPHILS NFR BLD MANUAL: 57 % (ref 42.7–76)
NEUTS BAND NFR BLD MANUAL: 5 % (ref 0–5)
PLATELET # BLD AUTO: 559 10*3/MM3 (ref 140–450)
PMV BLD AUTO: 10 FL (ref 6–12)
RBC # BLD AUTO: 2.81 10*6/MM3 (ref 3.77–5.28)
RBC MORPH BLD: NORMAL
SCAN SLIDE: NORMAL
SMALL PLATELETS BLD QL SMEAR: ABNORMAL
VARIANT LYMPHS NFR BLD MANUAL: 22 % (ref 19.6–45.3)
WBC NRBC COR # BLD: 14.14 10*3/MM3 (ref 3.4–10.8)

## 2022-07-26 PROCEDURE — 63710000001 INSULIN ASPART PER 5 UNITS: Performed by: SURGERY

## 2022-07-26 PROCEDURE — 99231 SBSQ HOSP IP/OBS SF/LOW 25: CPT | Performed by: SURGERY

## 2022-07-26 PROCEDURE — 25010000002 CEFTRIAXONE PER 250 MG: Performed by: NURSE PRACTITIONER

## 2022-07-26 PROCEDURE — 97161 PT EVAL LOW COMPLEX 20 MIN: CPT

## 2022-07-26 PROCEDURE — 25010000002 HEPARIN (PORCINE) PER 1000 UNITS: Performed by: SURGERY

## 2022-07-26 PROCEDURE — 97167 OT EVAL HIGH COMPLEX 60 MIN: CPT

## 2022-07-26 PROCEDURE — 85025 COMPLETE CBC W/AUTO DIFF WBC: CPT | Performed by: STUDENT IN AN ORGANIZED HEALTH CARE EDUCATION/TRAINING PROGRAM

## 2022-07-26 PROCEDURE — 99233 SBSQ HOSP IP/OBS HIGH 50: CPT | Performed by: INTERNAL MEDICINE

## 2022-07-26 PROCEDURE — 82962 GLUCOSE BLOOD TEST: CPT

## 2022-07-26 PROCEDURE — 85007 BL SMEAR W/DIFF WBC COUNT: CPT | Performed by: STUDENT IN AN ORGANIZED HEALTH CARE EDUCATION/TRAINING PROGRAM

## 2022-07-26 PROCEDURE — 99232 SBSQ HOSP IP/OBS MODERATE 35: CPT | Performed by: STUDENT IN AN ORGANIZED HEALTH CARE EDUCATION/TRAINING PROGRAM

## 2022-07-26 RX ADMIN — HYDROCODONE BITARTRATE AND ACETAMINOPHEN 10 ML: 7.5; 325 SOLUTION ORAL at 13:20

## 2022-07-26 RX ADMIN — INSULIN ASPART 6 UNITS: 100 INJECTION, SOLUTION INTRAVENOUS; SUBCUTANEOUS at 11:49

## 2022-07-26 RX ADMIN — ROPINIROLE HYDROCHLORIDE 0.5 MG: 0.25 TABLET, FILM COATED ORAL at 20:49

## 2022-07-26 RX ADMIN — HEPARIN SODIUM 5000 UNITS: 5000 INJECTION INTRAVENOUS; SUBCUTANEOUS at 20:49

## 2022-07-26 RX ADMIN — ATORVASTATIN CALCIUM 40 MG: 40 TABLET, FILM COATED ORAL at 20:49

## 2022-07-26 RX ADMIN — DONEPEZIL HYDROCHLORIDE 10 MG: 5 TABLET, FILM COATED ORAL at 20:49

## 2022-07-26 RX ADMIN — Medication 5000 UNITS: at 09:17

## 2022-07-26 RX ADMIN — INSULIN ASPART 2 UNITS: 100 INJECTION, SOLUTION INTRAVENOUS; SUBCUTANEOUS at 17:32

## 2022-07-26 RX ADMIN — HEPARIN SODIUM 5000 UNITS: 5000 INJECTION INTRAVENOUS; SUBCUTANEOUS at 09:17

## 2022-07-26 RX ADMIN — HYDROCODONE BITARTRATE AND ACETAMINOPHEN 10 ML: 7.5; 325 SOLUTION ORAL at 03:16

## 2022-07-26 RX ADMIN — Medication 10 ML: at 20:50

## 2022-07-26 RX ADMIN — CEFTRIAXONE 2 G: 2 INJECTION, POWDER, FOR SOLUTION INTRAMUSCULAR; INTRAVENOUS at 13:22

## 2022-07-26 RX ADMIN — CASTOR OIL AND BALSAM, PERU 1 APPLICATION: 788; 87 OINTMENT TOPICAL at 09:24

## 2022-07-26 RX ADMIN — ASPIRIN 81 MG: 81 TABLET, CHEWABLE ORAL at 09:17

## 2022-07-26 RX ADMIN — Medication 20 ML: at 13:00

## 2022-07-26 RX ADMIN — Medication 10 ML: at 09:17

## 2022-07-26 RX ADMIN — LEVOTHYROXINE SODIUM 25 MCG: 25 TABLET ORAL at 05:44

## 2022-07-26 RX ADMIN — CASTOR OIL AND BALSAM, PERU 1 APPLICATION: 788; 87 OINTMENT TOPICAL at 20:49

## 2022-07-26 RX ADMIN — INSULIN ASPART 7 UNITS: 100 INJECTION, SOLUTION INTRAVENOUS; SUBCUTANEOUS at 09:17

## 2022-07-26 NOTE — ANESTHESIA POSTPROCEDURE EVALUATION
Patient: Yecenia Botello    Procedure Summary     Date: 07/23/22 Room / Location: [facility] OR 03 /  [facility] OR    Anesthesia Start: 1229 Anesthesia Stop: 1328    Procedure: DEBRIDEMENT OF ISCHIAL ULCER/BUTTOCKS WOUND (N/A Buttocks) Diagnosis:       Pressure injury, stage 4, with infection (HCC)      (Pressure injury, stage 4, with infection (HCC) [L89.94, L08.9])    Surgeons: Daja Ivory MD Provider: Jesu Mariee MD    Anesthesia Type: MAC ASA Status: 4          Anesthesia Type: MAC    Vitals  Vitals Value Taken Time   BP 96/62 07/23/22 1349   Temp 97.2 °F (36.2 °C) 07/23/22 1349   Pulse 69 07/23/22 1349   Resp 21 07/23/22 1349   SpO2 99 % 07/23/22 1349           Post Anesthesia Care and Evaluation    Patient location during evaluation: PHASE II  Patient participation: complete - patient participated  Level of consciousness: awake and alert  Pain score: 0  Pain management: adequate    Airway patency: patent  Anesthetic complications: No anesthetic complications    Cardiovascular status: acceptable  Respiratory status: acceptable  Hydration status: acceptable

## 2022-07-27 LAB
ANION GAP SERPL CALCULATED.3IONS-SCNC: 5.1 MMOL/L (ref 5–15)
BUN SERPL-MCNC: 21 MG/DL (ref 8–23)
BUN/CREAT SERPL: 35.6 (ref 7–25)
CALCIUM SPEC-SCNC: 8.4 MG/DL (ref 8.6–10.5)
CHLORIDE SERPL-SCNC: 107 MMOL/L (ref 98–107)
CO2 SERPL-SCNC: 24.9 MMOL/L (ref 22–29)
CREAT SERPL-MCNC: 0.59 MG/DL (ref 0.57–1)
DEPRECATED RDW RBC AUTO: 47 FL (ref 37–54)
EGFRCR SERPLBLD CKD-EPI 2021: 97.1 ML/MIN/1.73
EOSINOPHIL # BLD MANUAL: 0.79 10*3/MM3 (ref 0–0.4)
EOSINOPHIL NFR BLD MANUAL: 5 % (ref 0.3–6.2)
ERYTHROCYTE [DISTWIDTH] IN BLOOD BY AUTOMATED COUNT: 13.9 % (ref 12.3–15.4)
GLUCOSE BLDC GLUCOMTR-MCNC: 156 MG/DL (ref 70–130)
GLUCOSE BLDC GLUCOMTR-MCNC: 162 MG/DL (ref 70–130)
GLUCOSE BLDC GLUCOMTR-MCNC: 232 MG/DL (ref 70–130)
GLUCOSE BLDC GLUCOMTR-MCNC: 268 MG/DL (ref 70–130)
GLUCOSE SERPL-MCNC: 336 MG/DL (ref 65–99)
HCT VFR BLD AUTO: 26.4 % (ref 34–46.6)
HGB BLD-MCNC: 8.4 G/DL (ref 12–15.9)
HYPOCHROMIA BLD QL: ABNORMAL
LYMPHOCYTES # BLD MANUAL: 3.78 10*3/MM3 (ref 0.7–3.1)
LYMPHOCYTES NFR BLD MANUAL: 3 % (ref 5–12)
MCH RBC QN AUTO: 30 PG (ref 26.6–33)
MCHC RBC AUTO-ENTMCNC: 31.8 G/DL (ref 31.5–35.7)
MCV RBC AUTO: 94.3 FL (ref 79–97)
METAMYELOCYTES NFR BLD MANUAL: 2 % (ref 0–0)
MONOCYTES # BLD: 0.47 10*3/MM3 (ref 0.1–0.9)
MYELOCYTES NFR BLD MANUAL: 3 % (ref 0–0)
NEUTROPHILS # BLD AUTO: 9.94 10*3/MM3 (ref 1.7–7)
NEUTROPHILS NFR BLD MANUAL: 63 % (ref 42.7–76)
PLATELET # BLD AUTO: 526 10*3/MM3 (ref 140–450)
PMV BLD AUTO: 10.2 FL (ref 6–12)
POTASSIUM SERPL-SCNC: 4.2 MMOL/L (ref 3.5–5.2)
RBC # BLD AUTO: 2.8 10*6/MM3 (ref 3.77–5.28)
SMALL PLATELETS BLD QL SMEAR: ABNORMAL
SODIUM SERPL-SCNC: 137 MMOL/L (ref 136–145)
TOXIC GRANULATION: ABNORMAL
VARIANT LYMPHS NFR BLD MANUAL: 24 % (ref 19.6–45.3)
WBC NRBC COR # BLD: 15.77 10*3/MM3 (ref 3.4–10.8)

## 2022-07-27 PROCEDURE — 63710000001 INSULIN DETEMIR PER 5 UNITS: Performed by: STUDENT IN AN ORGANIZED HEALTH CARE EDUCATION/TRAINING PROGRAM

## 2022-07-27 PROCEDURE — 85025 COMPLETE CBC W/AUTO DIFF WBC: CPT | Performed by: STUDENT IN AN ORGANIZED HEALTH CARE EDUCATION/TRAINING PROGRAM

## 2022-07-27 PROCEDURE — 25010000002 HEPARIN (PORCINE) PER 1000 UNITS: Performed by: SURGERY

## 2022-07-27 PROCEDURE — 82962 GLUCOSE BLOOD TEST: CPT

## 2022-07-27 PROCEDURE — 99232 SBSQ HOSP IP/OBS MODERATE 35: CPT | Performed by: STUDENT IN AN ORGANIZED HEALTH CARE EDUCATION/TRAINING PROGRAM

## 2022-07-27 PROCEDURE — 80048 BASIC METABOLIC PNL TOTAL CA: CPT | Performed by: STUDENT IN AN ORGANIZED HEALTH CARE EDUCATION/TRAINING PROGRAM

## 2022-07-27 PROCEDURE — 99232 SBSQ HOSP IP/OBS MODERATE 35: CPT | Performed by: INTERNAL MEDICINE

## 2022-07-27 PROCEDURE — 25010000002 CEFTRIAXONE PER 250 MG: Performed by: NURSE PRACTITIONER

## 2022-07-27 PROCEDURE — 63710000001 INSULIN ASPART PER 5 UNITS: Performed by: SURGERY

## 2022-07-27 PROCEDURE — 85007 BL SMEAR W/DIFF WBC COUNT: CPT | Performed by: STUDENT IN AN ORGANIZED HEALTH CARE EDUCATION/TRAINING PROGRAM

## 2022-07-27 PROCEDURE — 63710000001 INSULIN ASPART PER 5 UNITS: Performed by: PHYSICIAN ASSISTANT

## 2022-07-27 RX ORDER — LOSARTAN POTASSIUM 25 MG/1
25 TABLET ORAL
Status: DISCONTINUED | OUTPATIENT
Start: 2022-07-27 | End: 2022-07-28

## 2022-07-27 RX ORDER — INSULIN ASPART 100 [IU]/ML
3 INJECTION, SOLUTION INTRAVENOUS; SUBCUTANEOUS ONCE
Status: COMPLETED | OUTPATIENT
Start: 2022-07-27 | End: 2022-07-27

## 2022-07-27 RX ADMIN — CEFTRIAXONE 2 G: 2 INJECTION, POWDER, FOR SOLUTION INTRAMUSCULAR; INTRAVENOUS at 11:39

## 2022-07-27 RX ADMIN — INSULIN DETEMIR 10 UNITS: 100 INJECTION, SOLUTION SUBCUTANEOUS at 08:29

## 2022-07-27 RX ADMIN — Medication 10 ML: at 08:27

## 2022-07-27 RX ADMIN — INSULIN ASPART 4 UNITS: 100 INJECTION, SOLUTION INTRAVENOUS; SUBCUTANEOUS at 11:39

## 2022-07-27 RX ADMIN — Medication 20 ML: at 01:13

## 2022-07-27 RX ADMIN — CASTOR OIL AND BALSAM, PERU 1 APPLICATION: 788; 87 OINTMENT TOPICAL at 21:00

## 2022-07-27 RX ADMIN — INSULIN ASPART 6 UNITS: 100 INJECTION, SOLUTION INTRAVENOUS; SUBCUTANEOUS at 08:27

## 2022-07-27 RX ADMIN — CASTOR OIL AND BALSAM, PERU 1 APPLICATION: 788; 87 OINTMENT TOPICAL at 08:27

## 2022-07-27 RX ADMIN — DONEPEZIL HYDROCHLORIDE 10 MG: 5 TABLET, FILM COATED ORAL at 20:12

## 2022-07-27 RX ADMIN — HEPARIN SODIUM 5000 UNITS: 5000 INJECTION INTRAVENOUS; SUBCUTANEOUS at 08:27

## 2022-07-27 RX ADMIN — HEPARIN SODIUM 5000 UNITS: 5000 INJECTION INTRAVENOUS; SUBCUTANEOUS at 20:12

## 2022-07-27 RX ADMIN — ASPIRIN 81 MG: 81 TABLET, CHEWABLE ORAL at 08:27

## 2022-07-27 RX ADMIN — Medication 10 ML: at 23:46

## 2022-07-27 RX ADMIN — Medication 20 ML: at 21:00

## 2022-07-27 RX ADMIN — INSULIN ASPART 2 UNITS: 100 INJECTION, SOLUTION INTRAVENOUS; SUBCUTANEOUS at 16:55

## 2022-07-27 RX ADMIN — Medication 5000 UNITS: at 08:27

## 2022-07-27 RX ADMIN — ROPINIROLE HYDROCHLORIDE 0.5 MG: 0.25 TABLET, FILM COATED ORAL at 20:12

## 2022-07-27 RX ADMIN — INSULIN ASPART 3 UNITS: 100 INJECTION, SOLUTION INTRAVENOUS; SUBCUTANEOUS at 04:37

## 2022-07-27 RX ADMIN — Medication 20 ML: at 08:29

## 2022-07-27 RX ADMIN — LOSARTAN POTASSIUM 25 MG: 25 TABLET, FILM COATED ORAL at 09:43

## 2022-07-27 RX ADMIN — ATORVASTATIN CALCIUM 40 MG: 40 TABLET, FILM COATED ORAL at 20:12

## 2022-07-27 RX ADMIN — HYDROCODONE BITARTRATE AND ACETAMINOPHEN 10 ML: 7.5; 325 SOLUTION ORAL at 04:38

## 2022-07-27 RX ADMIN — LEVOTHYROXINE SODIUM 25 MCG: 25 TABLET ORAL at 06:19

## 2022-07-28 LAB
ANION GAP SERPL CALCULATED.3IONS-SCNC: 8 MMOL/L (ref 5–15)
ANISOCYTOSIS BLD QL: ABNORMAL
BACTERIA SPEC AEROBE CULT: NORMAL
BACTERIA SPEC AEROBE CULT: NORMAL
BUN SERPL-MCNC: 21 MG/DL (ref 8–23)
BUN/CREAT SERPL: 45.7 (ref 7–25)
CALCIUM SPEC-SCNC: 8.2 MG/DL (ref 8.6–10.5)
CHLORIDE SERPL-SCNC: 104 MMOL/L (ref 98–107)
CO2 SERPL-SCNC: 22 MMOL/L (ref 22–29)
CREAT SERPL-MCNC: 0.46 MG/DL (ref 0.57–1)
D-LACTATE SERPL-SCNC: 2.3 MMOL/L (ref 0.5–2)
DEPRECATED RDW RBC AUTO: 48.9 FL (ref 37–54)
EGFRCR SERPLBLD CKD-EPI 2021: 103.1 ML/MIN/1.73
EOSINOPHIL # BLD MANUAL: 0.39 10*3/MM3 (ref 0–0.4)
EOSINOPHIL NFR BLD MANUAL: 2 % (ref 0.3–6.2)
ERYTHROCYTE [DISTWIDTH] IN BLOOD BY AUTOMATED COUNT: 14.1 % (ref 12.3–15.4)
GLUCOSE BLDC GLUCOMTR-MCNC: 147 MG/DL (ref 70–130)
GLUCOSE BLDC GLUCOMTR-MCNC: 158 MG/DL (ref 70–130)
GLUCOSE BLDC GLUCOMTR-MCNC: 278 MG/DL (ref 70–130)
GLUCOSE BLDC GLUCOMTR-MCNC: 301 MG/DL (ref 70–130)
GLUCOSE SERPL-MCNC: 275 MG/DL (ref 65–99)
HCT VFR BLD AUTO: 26.8 % (ref 34–46.6)
HGB BLD-MCNC: 8.4 G/DL (ref 12–15.9)
HYPOCHROMIA BLD QL: ABNORMAL
LYMPHOCYTES # BLD MANUAL: 3.34 10*3/MM3 (ref 0.7–3.1)
LYMPHOCYTES NFR BLD MANUAL: 3 % (ref 5–12)
MCH RBC QN AUTO: 30.2 PG (ref 26.6–33)
MCHC RBC AUTO-ENTMCNC: 31.3 G/DL (ref 31.5–35.7)
MCV RBC AUTO: 96.4 FL (ref 79–97)
MONOCYTES # BLD: 0.59 10*3/MM3 (ref 0.1–0.9)
MYELOCYTES NFR BLD MANUAL: 1 % (ref 0–0)
NEUTROPHILS # BLD AUTO: 15.15 10*3/MM3 (ref 1.7–7)
NEUTROPHILS NFR BLD MANUAL: 72 % (ref 42.7–76)
NEUTS BAND NFR BLD MANUAL: 5 % (ref 0–5)
OVALOCYTES BLD QL SMEAR: ABNORMAL
PLAT MORPH BLD: NORMAL
PLATELET # BLD AUTO: 379 10*3/MM3 (ref 140–450)
PMV BLD AUTO: 11.2 FL (ref 6–12)
POTASSIUM SERPL-SCNC: 4.5 MMOL/L (ref 3.5–5.2)
RBC # BLD AUTO: 2.78 10*6/MM3 (ref 3.77–5.28)
SODIUM SERPL-SCNC: 134 MMOL/L (ref 136–145)
VARIANT LYMPHS NFR BLD MANUAL: 17 % (ref 19.6–45.3)
WBC NRBC COR # BLD: 19.67 10*3/MM3 (ref 3.4–10.8)

## 2022-07-28 PROCEDURE — 25010000002 HEPARIN (PORCINE) PER 1000 UNITS: Performed by: SURGERY

## 2022-07-28 PROCEDURE — 83605 ASSAY OF LACTIC ACID: CPT | Performed by: PHYSICIAN ASSISTANT

## 2022-07-28 PROCEDURE — 63710000001 INSULIN DETEMIR PER 5 UNITS: Performed by: STUDENT IN AN ORGANIZED HEALTH CARE EDUCATION/TRAINING PROGRAM

## 2022-07-28 PROCEDURE — 85007 BL SMEAR W/DIFF WBC COUNT: CPT | Performed by: STUDENT IN AN ORGANIZED HEALTH CARE EDUCATION/TRAINING PROGRAM

## 2022-07-28 PROCEDURE — 63710000001 INSULIN ASPART PER 5 UNITS: Performed by: SURGERY

## 2022-07-28 PROCEDURE — 25010000002 CEFTRIAXONE PER 250 MG: Performed by: NURSE PRACTITIONER

## 2022-07-28 PROCEDURE — 99233 SBSQ HOSP IP/OBS HIGH 50: CPT | Performed by: INTERNAL MEDICINE

## 2022-07-28 PROCEDURE — 80048 BASIC METABOLIC PNL TOTAL CA: CPT | Performed by: STUDENT IN AN ORGANIZED HEALTH CARE EDUCATION/TRAINING PROGRAM

## 2022-07-28 PROCEDURE — 82962 GLUCOSE BLOOD TEST: CPT

## 2022-07-28 PROCEDURE — 85025 COMPLETE CBC W/AUTO DIFF WBC: CPT | Performed by: STUDENT IN AN ORGANIZED HEALTH CARE EDUCATION/TRAINING PROGRAM

## 2022-07-28 PROCEDURE — 99232 SBSQ HOSP IP/OBS MODERATE 35: CPT | Performed by: STUDENT IN AN ORGANIZED HEALTH CARE EDUCATION/TRAINING PROGRAM

## 2022-07-28 RX ORDER — LOSARTAN POTASSIUM 50 MG/1
50 TABLET ORAL
Status: DISCONTINUED | OUTPATIENT
Start: 2022-07-28 | End: 2022-07-30 | Stop reason: HOSPADM

## 2022-07-28 RX ADMIN — LOSARTAN POTASSIUM 50 MG: 50 TABLET, FILM COATED ORAL at 10:45

## 2022-07-28 RX ADMIN — Medication 5000 UNITS: at 10:45

## 2022-07-28 RX ADMIN — HEPARIN SODIUM 5000 UNITS: 5000 INJECTION INTRAVENOUS; SUBCUTANEOUS at 21:42

## 2022-07-28 RX ADMIN — INSULIN ASPART 6 UNITS: 100 INJECTION, SOLUTION INTRAVENOUS; SUBCUTANEOUS at 13:25

## 2022-07-28 RX ADMIN — ATORVASTATIN CALCIUM 40 MG: 40 TABLET, FILM COATED ORAL at 21:42

## 2022-07-28 RX ADMIN — CASTOR OIL AND BALSAM, PERU 1 APPLICATION: 788; 87 OINTMENT TOPICAL at 10:47

## 2022-07-28 RX ADMIN — Medication 20 ML: at 10:47

## 2022-07-28 RX ADMIN — INSULIN ASPART 7 UNITS: 100 INJECTION, SOLUTION INTRAVENOUS; SUBCUTANEOUS at 10:44

## 2022-07-28 RX ADMIN — LEVOTHYROXINE SODIUM 25 MCG: 25 TABLET ORAL at 05:38

## 2022-07-28 RX ADMIN — DONEPEZIL HYDROCHLORIDE 10 MG: 5 TABLET, FILM COATED ORAL at 21:42

## 2022-07-28 RX ADMIN — INSULIN DETEMIR 10 UNITS: 100 INJECTION, SOLUTION SUBCUTANEOUS at 10:47

## 2022-07-28 RX ADMIN — HEPARIN SODIUM 5000 UNITS: 5000 INJECTION INTRAVENOUS; SUBCUTANEOUS at 10:46

## 2022-07-28 RX ADMIN — Medication 10 ML: at 10:46

## 2022-07-28 RX ADMIN — ROPINIROLE HYDROCHLORIDE 0.5 MG: 0.25 TABLET, FILM COATED ORAL at 21:42

## 2022-07-28 RX ADMIN — ASPIRIN 81 MG: 81 TABLET, CHEWABLE ORAL at 10:45

## 2022-07-28 RX ADMIN — CEFTRIAXONE 2 G: 2 INJECTION, POWDER, FOR SOLUTION INTRAMUSCULAR; INTRAVENOUS at 13:25

## 2022-07-29 ENCOUNTER — APPOINTMENT (OUTPATIENT)
Dept: CARDIOLOGY | Facility: HOSPITAL | Age: 72
End: 2022-07-29

## 2022-07-29 LAB
ANION GAP SERPL CALCULATED.3IONS-SCNC: 6.5 MMOL/L (ref 5–15)
BACTERIA SPEC ANAEROBE CULT: ABNORMAL
BH CV ECHO MEAS - EDV(MOD-SP4): 49.9 ML
BH CV ECHO MEAS - EF(MOD-SP4): 36.1 %
BH CV ECHO MEAS - ESV(MOD-SP4): 31.9 ML
BH CV ECHO MEAS - LV DIASTOLIC VOL/BSA (35-75): 30.4 CM2
BH CV ECHO MEAS - LV SYSTOLIC VOL/BSA (12-30): 19.5 CM2
BH CV ECHO MEAS - SI(MOD-SP4): 11 ML/M2
BH CV ECHO MEAS - SV(MOD-SP4): 18 ML
BH CV ECHO MEAS - TR MAX PG: 23.6 MMHG
BH CV ECHO MEAS - TR MAX VEL: 243 CM/SEC
BUN SERPL-MCNC: 21 MG/DL (ref 8–23)
BUN/CREAT SERPL: 91.3 (ref 7–25)
CALCIUM SPEC-SCNC: 8.3 MG/DL (ref 8.6–10.5)
CHLORIDE SERPL-SCNC: 103 MMOL/L (ref 98–107)
CO2 SERPL-SCNC: 25.5 MMOL/L (ref 22–29)
CREAT SERPL-MCNC: 0.23 MG/DL (ref 0.57–1)
DEPRECATED RDW RBC AUTO: 48.1 FL (ref 37–54)
EGFRCR SERPLBLD CKD-EPI 2021: 121.8 ML/MIN/1.73
EOSINOPHIL # BLD MANUAL: 0.94 10*3/MM3 (ref 0–0.4)
EOSINOPHIL NFR BLD MANUAL: 5 % (ref 0.3–6.2)
ERYTHROCYTE [DISTWIDTH] IN BLOOD BY AUTOMATED COUNT: 14.4 % (ref 12.3–15.4)
GLUCOSE BLDC GLUCOMTR-MCNC: 141 MG/DL (ref 70–130)
GLUCOSE BLDC GLUCOMTR-MCNC: 184 MG/DL (ref 70–130)
GLUCOSE BLDC GLUCOMTR-MCNC: 284 MG/DL (ref 70–130)
GLUCOSE BLDC GLUCOMTR-MCNC: 304 MG/DL (ref 70–130)
GLUCOSE SERPL-MCNC: 183 MG/DL (ref 65–99)
HCT VFR BLD AUTO: 26.5 % (ref 34–46.6)
HGB BLD-MCNC: 8.3 G/DL (ref 12–15.9)
HYPOCHROMIA BLD QL: ABNORMAL
LYMPHOCYTES # BLD MANUAL: 4.31 10*3/MM3 (ref 0.7–3.1)
LYMPHOCYTES NFR BLD MANUAL: 6 % (ref 5–12)
MACROCYTES BLD QL SMEAR: ABNORMAL
MAXIMAL PREDICTED HEART RATE: 150 BPM
MCH RBC QN AUTO: 29.9 PG (ref 26.6–33)
MCHC RBC AUTO-ENTMCNC: 31.3 G/DL (ref 31.5–35.7)
MCV RBC AUTO: 95.3 FL (ref 79–97)
MONOCYTES # BLD: 1.13 10*3/MM3 (ref 0.1–0.9)
MYELOCYTES NFR BLD MANUAL: 2 % (ref 0–0)
NEUTROPHILS # BLD AUTO: 12 10*3/MM3 (ref 1.7–7)
NEUTROPHILS NFR BLD MANUAL: 58 % (ref 42.7–76)
NEUTS BAND NFR BLD MANUAL: 6 % (ref 0–5)
PLATELET # BLD AUTO: 517 10*3/MM3 (ref 140–450)
PMV BLD AUTO: 10.3 FL (ref 6–12)
POTASSIUM SERPL-SCNC: 4.1 MMOL/L (ref 3.5–5.2)
RBC # BLD AUTO: 2.78 10*6/MM3 (ref 3.77–5.28)
SMALL PLATELETS BLD QL SMEAR: ABNORMAL
SODIUM SERPL-SCNC: 135 MMOL/L (ref 136–145)
STRESS TARGET HR: 128 BPM
VARIANT LYMPHS NFR BLD MANUAL: 23 % (ref 19.6–45.3)
WBC NRBC COR # BLD: 18.75 10*3/MM3 (ref 3.4–10.8)

## 2022-07-29 PROCEDURE — 93321 DOPPLER ECHO F-UP/LMTD STD: CPT

## 2022-07-29 PROCEDURE — 87040 BLOOD CULTURE FOR BACTERIA: CPT | Performed by: PHYSICIAN ASSISTANT

## 2022-07-29 PROCEDURE — 80048 BASIC METABOLIC PNL TOTAL CA: CPT | Performed by: STUDENT IN AN ORGANIZED HEALTH CARE EDUCATION/TRAINING PROGRAM

## 2022-07-29 PROCEDURE — 25010000002 CEFTRIAXONE PER 250 MG: Performed by: NURSE PRACTITIONER

## 2022-07-29 PROCEDURE — 85025 COMPLETE CBC W/AUTO DIFF WBC: CPT | Performed by: STUDENT IN AN ORGANIZED HEALTH CARE EDUCATION/TRAINING PROGRAM

## 2022-07-29 PROCEDURE — 99233 SBSQ HOSP IP/OBS HIGH 50: CPT | Performed by: INTERNAL MEDICINE

## 2022-07-29 PROCEDURE — 93325 DOPPLER ECHO COLOR FLOW MAPG: CPT

## 2022-07-29 PROCEDURE — 93308 TTE F-UP OR LMTD: CPT

## 2022-07-29 PROCEDURE — 63710000001 INSULIN ASPART PER 5 UNITS: Performed by: SURGERY

## 2022-07-29 PROCEDURE — 85007 BL SMEAR W/DIFF WBC COUNT: CPT | Performed by: STUDENT IN AN ORGANIZED HEALTH CARE EDUCATION/TRAINING PROGRAM

## 2022-07-29 PROCEDURE — 82962 GLUCOSE BLOOD TEST: CPT

## 2022-07-29 PROCEDURE — 99232 SBSQ HOSP IP/OBS MODERATE 35: CPT | Performed by: STUDENT IN AN ORGANIZED HEALTH CARE EDUCATION/TRAINING PROGRAM

## 2022-07-29 PROCEDURE — 63710000001 INSULIN DETEMIR PER 5 UNITS: Performed by: STUDENT IN AN ORGANIZED HEALTH CARE EDUCATION/TRAINING PROGRAM

## 2022-07-29 PROCEDURE — 25010000002 HEPARIN (PORCINE) PER 1000 UNITS: Performed by: SURGERY

## 2022-07-29 RX ADMIN — INSULIN ASPART 7 UNITS: 100 INJECTION, SOLUTION INTRAVENOUS; SUBCUTANEOUS at 11:53

## 2022-07-29 RX ADMIN — Medication 20 ML: at 20:46

## 2022-07-29 RX ADMIN — CEFTRIAXONE 2 G: 2 INJECTION, POWDER, FOR SOLUTION INTRAMUSCULAR; INTRAVENOUS at 13:16

## 2022-07-29 RX ADMIN — HEPARIN SODIUM 5000 UNITS: 5000 INJECTION INTRAVENOUS; SUBCUTANEOUS at 09:09

## 2022-07-29 RX ADMIN — INSULIN ASPART 6 UNITS: 100 INJECTION, SOLUTION INTRAVENOUS; SUBCUTANEOUS at 09:08

## 2022-07-29 RX ADMIN — LOSARTAN POTASSIUM 50 MG: 50 TABLET, FILM COATED ORAL at 09:08

## 2022-07-29 RX ADMIN — Medication 20 ML: at 00:32

## 2022-07-29 RX ADMIN — Medication 10 ML: at 20:32

## 2022-07-29 RX ADMIN — DONEPEZIL HYDROCHLORIDE 10 MG: 5 TABLET, FILM COATED ORAL at 20:31

## 2022-07-29 RX ADMIN — HEPARIN SODIUM 5000 UNITS: 5000 INJECTION INTRAVENOUS; SUBCUTANEOUS at 20:31

## 2022-07-29 RX ADMIN — ATORVASTATIN CALCIUM 40 MG: 40 TABLET, FILM COATED ORAL at 20:32

## 2022-07-29 RX ADMIN — ASPIRIN 81 MG: 81 TABLET, CHEWABLE ORAL at 09:08

## 2022-07-29 RX ADMIN — Medication 10 ML: at 09:09

## 2022-07-29 RX ADMIN — LEVOTHYROXINE SODIUM 25 MCG: 25 TABLET ORAL at 06:06

## 2022-07-29 RX ADMIN — CASTOR OIL AND BALSAM, PERU 1 APPLICATION: 788; 87 OINTMENT TOPICAL at 09:09

## 2022-07-29 RX ADMIN — CASTOR OIL AND BALSAM, PERU 1 APPLICATION: 788; 87 OINTMENT TOPICAL at 00:32

## 2022-07-29 RX ADMIN — INSULIN DETEMIR 15 UNITS: 100 INJECTION, SOLUTION SUBCUTANEOUS at 09:10

## 2022-07-29 RX ADMIN — Medication 20 ML: at 09:09

## 2022-07-29 RX ADMIN — Medication 5000 UNITS: at 09:08

## 2022-07-29 RX ADMIN — CASTOR OIL AND BALSAM, PERU 1 APPLICATION: 788; 87 OINTMENT TOPICAL at 20:32

## 2022-07-29 RX ADMIN — ROPINIROLE HYDROCHLORIDE 0.5 MG: 0.25 TABLET, FILM COATED ORAL at 20:31

## 2022-07-30 ENCOUNTER — HOSPITAL ENCOUNTER (INPATIENT)
Facility: HOSPITAL | Age: 72
LOS: 19 days | Discharge: HOME HEALTH | End: 2022-08-18
Attending: STUDENT IN AN ORGANIZED HEALTH CARE EDUCATION/TRAINING PROGRAM | Admitting: STUDENT IN AN ORGANIZED HEALTH CARE EDUCATION/TRAINING PROGRAM

## 2022-07-30 VITALS
DIASTOLIC BLOOD PRESSURE: 61 MMHG | HEIGHT: 64 IN | HEART RATE: 79 BPM | RESPIRATION RATE: 16 BRPM | SYSTOLIC BLOOD PRESSURE: 114 MMHG | WEIGHT: 135.5 LBS | BODY MASS INDEX: 23.13 KG/M2 | OXYGEN SATURATION: 94 % | TEMPERATURE: 98.1 F

## 2022-07-30 DIAGNOSIS — L08.9 PRESSURE INJURY, UNSTAGEABLE, WITH INFECTION: ICD-10-CM

## 2022-07-30 DIAGNOSIS — L89.95 PRESSURE INJURY, UNSTAGEABLE, WITH INFECTION: ICD-10-CM

## 2022-07-30 DIAGNOSIS — L08.9 PRESSURE INJURY, STAGE 4, WITH INFECTION: Primary | ICD-10-CM

## 2022-07-30 DIAGNOSIS — L89.94 PRESSURE INJURY, STAGE 4, WITH INFECTION: Primary | ICD-10-CM

## 2022-07-30 PROBLEM — R53.81 DEBILITY: Status: ACTIVE | Noted: 2022-07-30

## 2022-07-30 LAB
ANION GAP SERPL CALCULATED.3IONS-SCNC: 7.8 MMOL/L (ref 5–15)
BUN SERPL-MCNC: 24 MG/DL (ref 8–23)
BUN/CREAT SERPL: 55.8 (ref 7–25)
CALCIUM SPEC-SCNC: 8.2 MG/DL (ref 8.6–10.5)
CHLORIDE SERPL-SCNC: 103 MMOL/L (ref 98–107)
CO2 SERPL-SCNC: 25.2 MMOL/L (ref 22–29)
CREAT SERPL-MCNC: 0.43 MG/DL (ref 0.57–1)
DEPRECATED RDW RBC AUTO: 51.9 FL (ref 37–54)
EGFRCR SERPLBLD CKD-EPI 2021: 104.8 ML/MIN/1.73
EOSINOPHIL # BLD MANUAL: 0.71 10*3/MM3 (ref 0–0.4)
EOSINOPHIL NFR BLD MANUAL: 4 % (ref 0.3–6.2)
ERYTHROCYTE [DISTWIDTH] IN BLOOD BY AUTOMATED COUNT: 15.2 % (ref 12.3–15.4)
GLUCOSE BLDC GLUCOMTR-MCNC: 207 MG/DL (ref 70–130)
GLUCOSE BLDC GLUCOMTR-MCNC: 209 MG/DL (ref 70–130)
GLUCOSE BLDC GLUCOMTR-MCNC: 264 MG/DL (ref 70–130)
GLUCOSE BLDC GLUCOMTR-MCNC: 301 MG/DL (ref 70–130)
GLUCOSE SERPL-MCNC: 246 MG/DL (ref 65–99)
HCT VFR BLD AUTO: 29.2 % (ref 34–46.6)
HGB BLD-MCNC: 8.8 G/DL (ref 12–15.9)
HYPOCHROMIA BLD QL: ABNORMAL
LYMPHOCYTES # BLD MANUAL: 3.39 10*3/MM3 (ref 0.7–3.1)
LYMPHOCYTES NFR BLD MANUAL: 1 % (ref 5–12)
MACROCYTES BLD QL SMEAR: ABNORMAL
MCH RBC QN AUTO: 29.9 PG (ref 26.6–33)
MCHC RBC AUTO-ENTMCNC: 30.1 G/DL (ref 31.5–35.7)
MCV RBC AUTO: 99.3 FL (ref 79–97)
MONOCYTES # BLD: 0.18 10*3/MM3 (ref 0.1–0.9)
NEUTROPHILS # BLD AUTO: 13.54 10*3/MM3 (ref 1.7–7)
NEUTROPHILS NFR BLD MANUAL: 74 % (ref 42.7–76)
NEUTS BAND NFR BLD MANUAL: 2 % (ref 0–5)
PLAT MORPH BLD: NORMAL
PLATELET # BLD AUTO: 481 10*3/MM3 (ref 140–450)
PMV BLD AUTO: 10.4 FL (ref 6–12)
POTASSIUM SERPL-SCNC: 4.9 MMOL/L (ref 3.5–5.2)
QT INTERVAL: 406 MS
QTC INTERVAL: 485 MS
RBC # BLD AUTO: 2.94 10*6/MM3 (ref 3.77–5.28)
SCAN SLIDE: NORMAL
SODIUM SERPL-SCNC: 136 MMOL/L (ref 136–145)
VARIANT LYMPHS NFR BLD MANUAL: 19 % (ref 19.6–45.3)
WBC NRBC COR # BLD: 17.82 10*3/MM3 (ref 3.4–10.8)

## 2022-07-30 PROCEDURE — 25010000002 HEPARIN (PORCINE) PER 1000 UNITS: Performed by: SURGERY

## 2022-07-30 PROCEDURE — 82962 GLUCOSE BLOOD TEST: CPT

## 2022-07-30 PROCEDURE — 63710000001 INSULIN DETEMIR PER 5 UNITS: Performed by: STUDENT IN AN ORGANIZED HEALTH CARE EDUCATION/TRAINING PROGRAM

## 2022-07-30 PROCEDURE — 85025 COMPLETE CBC W/AUTO DIFF WBC: CPT | Performed by: STUDENT IN AN ORGANIZED HEALTH CARE EDUCATION/TRAINING PROGRAM

## 2022-07-30 PROCEDURE — 25010000002 CEFTRIAXONE PER 250 MG: Performed by: STUDENT IN AN ORGANIZED HEALTH CARE EDUCATION/TRAINING PROGRAM

## 2022-07-30 PROCEDURE — 93005 ELECTROCARDIOGRAM TRACING: CPT | Performed by: STUDENT IN AN ORGANIZED HEALTH CARE EDUCATION/TRAINING PROGRAM

## 2022-07-30 PROCEDURE — 99308 SBSQ NF CARE LOW MDM 20: CPT | Performed by: PHYSICIAN ASSISTANT

## 2022-07-30 PROCEDURE — 85007 BL SMEAR W/DIFF WBC COUNT: CPT | Performed by: STUDENT IN AN ORGANIZED HEALTH CARE EDUCATION/TRAINING PROGRAM

## 2022-07-30 PROCEDURE — 99239 HOSP IP/OBS DSCHRG MGMT >30: CPT | Performed by: STUDENT IN AN ORGANIZED HEALTH CARE EDUCATION/TRAINING PROGRAM

## 2022-07-30 PROCEDURE — 25010000002 HEPARIN (PORCINE) PER 1000 UNITS: Performed by: STUDENT IN AN ORGANIZED HEALTH CARE EDUCATION/TRAINING PROGRAM

## 2022-07-30 PROCEDURE — 63710000001 INSULIN ASPART PER 5 UNITS: Performed by: SURGERY

## 2022-07-30 PROCEDURE — 63710000001 INSULIN ASPART PER 5 UNITS: Performed by: STUDENT IN AN ORGANIZED HEALTH CARE EDUCATION/TRAINING PROGRAM

## 2022-07-30 PROCEDURE — 80048 BASIC METABOLIC PNL TOTAL CA: CPT | Performed by: STUDENT IN AN ORGANIZED HEALTH CARE EDUCATION/TRAINING PROGRAM

## 2022-07-30 RX ORDER — LEVOTHYROXINE SODIUM 0.03 MG/1
25 TABLET ORAL EVERY MORNING
Status: CANCELLED | OUTPATIENT
Start: 2022-07-31

## 2022-07-30 RX ORDER — SODIUM CHLORIDE 0.9 % (FLUSH) 0.9 %
10 SYRINGE (ML) INJECTION AS NEEDED
Status: CANCELLED | OUTPATIENT
Start: 2022-07-30

## 2022-07-30 RX ORDER — HEPARIN SODIUM 5000 [USP'U]/ML
5000 INJECTION, SOLUTION INTRAVENOUS; SUBCUTANEOUS EVERY 12 HOURS SCHEDULED
Status: DISCONTINUED | OUTPATIENT
Start: 2022-07-30 | End: 2022-08-18 | Stop reason: HOSPADM

## 2022-07-30 RX ORDER — INSULIN ASPART 100 [IU]/ML
0-9 INJECTION, SOLUTION INTRAVENOUS; SUBCUTANEOUS
Status: DISCONTINUED | OUTPATIENT
Start: 2022-07-30 | End: 2022-08-18 | Stop reason: HOSPADM

## 2022-07-30 RX ORDER — SODIUM CHLORIDE 0.9 % (FLUSH) 0.9 %
10 SYRINGE (ML) INJECTION AS NEEDED
Status: DISCONTINUED | OUTPATIENT
Start: 2022-07-30 | End: 2022-08-18 | Stop reason: HOSPADM

## 2022-07-30 RX ORDER — DEXTROSE MONOHYDRATE 25 G/50ML
25 INJECTION, SOLUTION INTRAVENOUS
Status: DISCONTINUED | OUTPATIENT
Start: 2022-07-30 | End: 2022-08-18 | Stop reason: HOSPADM

## 2022-07-30 RX ORDER — ATORVASTATIN CALCIUM 40 MG/1
40 TABLET, FILM COATED ORAL NIGHTLY
Status: DISCONTINUED | OUTPATIENT
Start: 2022-07-30 | End: 2022-08-18 | Stop reason: HOSPADM

## 2022-07-30 RX ORDER — SODIUM HYPOCHLORITE 5 MG/ML
20 SOLUTION TOPICAL 2 TIMES DAILY PRN
Status: DISCONTINUED | OUTPATIENT
Start: 2022-07-30 | End: 2022-08-18 | Stop reason: HOSPADM

## 2022-07-30 RX ORDER — SODIUM HYPOCHLORITE 5 MG/ML
20 SOLUTION TOPICAL EVERY 12 HOURS SCHEDULED
Status: DISCONTINUED | OUTPATIENT
Start: 2022-07-30 | End: 2022-08-18 | Stop reason: HOSPADM

## 2022-07-30 RX ORDER — DEXTROSE MONOHYDRATE 25 G/50ML
25 INJECTION, SOLUTION INTRAVENOUS
Status: CANCELLED | OUTPATIENT
Start: 2022-07-30

## 2022-07-30 RX ORDER — NICOTINE POLACRILEX 4 MG
15 LOZENGE BUCCAL
Status: CANCELLED | OUTPATIENT
Start: 2022-07-30

## 2022-07-30 RX ORDER — ISOSORBIDE MONONITRATE 30 MG/1
30 TABLET, EXTENDED RELEASE ORAL
Status: DISCONTINUED | OUTPATIENT
Start: 2022-07-31 | End: 2022-08-18 | Stop reason: HOSPADM

## 2022-07-30 RX ORDER — DONEPEZIL HYDROCHLORIDE 5 MG/1
10 TABLET, FILM COATED ORAL NIGHTLY
Status: DISCONTINUED | OUTPATIENT
Start: 2022-07-30 | End: 2022-08-18 | Stop reason: HOSPADM

## 2022-07-30 RX ORDER — ROPINIROLE 0.25 MG/1
0.5 TABLET, FILM COATED ORAL NIGHTLY
Status: CANCELLED | OUTPATIENT
Start: 2022-07-30

## 2022-07-30 RX ORDER — LOSARTAN POTASSIUM 50 MG/1
50 TABLET ORAL
Status: CANCELLED | OUTPATIENT
Start: 2022-07-30

## 2022-07-30 RX ORDER — DONEPEZIL HYDROCHLORIDE 5 MG/1
10 TABLET, FILM COATED ORAL NIGHTLY
Status: CANCELLED | OUTPATIENT
Start: 2022-07-30

## 2022-07-30 RX ORDER — SODIUM HYPOCHLORITE 5 MG/ML
20 SOLUTION TOPICAL EVERY 12 HOURS SCHEDULED
Status: CANCELLED | OUTPATIENT
Start: 2022-07-30

## 2022-07-30 RX ORDER — ASPIRIN 81 MG/1
81 TABLET, CHEWABLE ORAL DAILY
Status: DISCONTINUED | OUTPATIENT
Start: 2022-07-31 | End: 2022-08-18 | Stop reason: HOSPADM

## 2022-07-30 RX ORDER — NICOTINE POLACRILEX 4 MG
15 LOZENGE BUCCAL
Status: DISCONTINUED | OUTPATIENT
Start: 2022-07-30 | End: 2022-08-18 | Stop reason: HOSPADM

## 2022-07-30 RX ORDER — ASPIRIN 81 MG/1
81 TABLET, CHEWABLE ORAL DAILY
Status: CANCELLED | OUTPATIENT
Start: 2022-07-30

## 2022-07-30 RX ORDER — ROPINIROLE 0.25 MG/1
0.5 TABLET, FILM COATED ORAL NIGHTLY
Status: DISCONTINUED | OUTPATIENT
Start: 2022-07-30 | End: 2022-08-18 | Stop reason: HOSPADM

## 2022-07-30 RX ORDER — SODIUM CHLORIDE 0.9 % (FLUSH) 0.9 %
10 SYRINGE (ML) INJECTION EVERY 12 HOURS SCHEDULED
Status: DISCONTINUED | OUTPATIENT
Start: 2022-07-30 | End: 2022-08-18 | Stop reason: HOSPADM

## 2022-07-30 RX ORDER — LEVOTHYROXINE SODIUM 0.03 MG/1
25 TABLET ORAL EVERY MORNING
Status: DISCONTINUED | OUTPATIENT
Start: 2022-07-31 | End: 2022-08-18 | Stop reason: HOSPADM

## 2022-07-30 RX ORDER — SODIUM HYPOCHLORITE 5 MG/ML
20 SOLUTION TOPICAL 2 TIMES DAILY PRN
Status: CANCELLED | OUTPATIENT
Start: 2022-07-30

## 2022-07-30 RX ORDER — ATORVASTATIN CALCIUM 40 MG/1
40 TABLET, FILM COATED ORAL NIGHTLY
Status: CANCELLED | OUTPATIENT
Start: 2022-07-30

## 2022-07-30 RX ORDER — HEPARIN SODIUM 5000 [USP'U]/ML
5000 INJECTION, SOLUTION INTRAVENOUS; SUBCUTANEOUS EVERY 12 HOURS SCHEDULED
Status: CANCELLED | OUTPATIENT
Start: 2022-07-30

## 2022-07-30 RX ORDER — SODIUM CHLORIDE 0.9 % (FLUSH) 0.9 %
10 SYRINGE (ML) INJECTION EVERY 12 HOURS SCHEDULED
Status: CANCELLED | OUTPATIENT
Start: 2022-07-30

## 2022-07-30 RX ORDER — LOSARTAN POTASSIUM 50 MG/1
50 TABLET ORAL
Status: DISCONTINUED | OUTPATIENT
Start: 2022-07-31 | End: 2022-08-18 | Stop reason: HOSPADM

## 2022-07-30 RX ORDER — INSULIN ASPART 100 [IU]/ML
0-9 INJECTION, SOLUTION INTRAVENOUS; SUBCUTANEOUS
Status: CANCELLED | OUTPATIENT
Start: 2022-07-30

## 2022-07-30 RX ORDER — METRONIDAZOLE 500 MG/100ML
500 INJECTION, SOLUTION INTRAVENOUS EVERY 8 HOURS
Status: DISPENSED | OUTPATIENT
Start: 2022-07-30 | End: 2022-08-09

## 2022-07-30 RX ADMIN — LEVOTHYROXINE SODIUM 25 MCG: 25 TABLET ORAL at 06:18

## 2022-07-30 RX ADMIN — TUBERCULIN PURIFIED PROTEIN DERIVATIVE 5 UNITS: 5 INJECTION, SOLUTION INTRADERMAL at 12:29

## 2022-07-30 RX ADMIN — Medication 20 ML: at 10:18

## 2022-07-30 RX ADMIN — ASPIRIN 81 MG: 81 TABLET, CHEWABLE ORAL at 10:18

## 2022-07-30 RX ADMIN — Medication 10 ML: at 10:17

## 2022-07-30 RX ADMIN — INSULIN ASPART 4 UNITS: 100 INJECTION, SOLUTION INTRAVENOUS; SUBCUTANEOUS at 16:43

## 2022-07-30 RX ADMIN — LOSARTAN POTASSIUM 50 MG: 50 TABLET, FILM COATED ORAL at 10:18

## 2022-07-30 RX ADMIN — HEPARIN SODIUM 5000 UNITS: 5000 INJECTION INTRAVENOUS; SUBCUTANEOUS at 10:17

## 2022-07-30 RX ADMIN — CEFTRIAXONE 2 G: 2 INJECTION, POWDER, FOR SOLUTION INTRAMUSCULAR; INTRAVENOUS at 12:29

## 2022-07-30 RX ADMIN — INSULIN ASPART 6 UNITS: 100 INJECTION, SOLUTION INTRAVENOUS; SUBCUTANEOUS at 10:17

## 2022-07-30 RX ADMIN — INSULIN DETEMIR 20 UNITS: 100 INJECTION, SOLUTION SUBCUTANEOUS at 20:16

## 2022-07-30 RX ADMIN — INSULIN ASPART 7 UNITS: 100 INJECTION, SOLUTION INTRAVENOUS; SUBCUTANEOUS at 12:28

## 2022-07-30 RX ADMIN — Medication 5000 UNITS: at 10:18

## 2022-07-30 RX ADMIN — METRONIDAZOLE 500 MG: 500 INJECTION, SOLUTION INTRAVENOUS at 20:08

## 2022-07-30 RX ADMIN — ATORVASTATIN CALCIUM 40 MG: 40 TABLET, FILM COATED ORAL at 20:07

## 2022-07-30 RX ADMIN — ROPINIROLE HYDROCHLORIDE 0.5 MG: 0.25 TABLET, FILM COATED ORAL at 20:07

## 2022-07-30 RX ADMIN — Medication 20 ML: at 20:07

## 2022-07-30 RX ADMIN — DONEPEZIL HYDROCHLORIDE 10 MG: 5 TABLET, FILM COATED ORAL at 20:07

## 2022-07-30 RX ADMIN — HEPARIN SODIUM 5000 UNITS: 5000 INJECTION INTRAVENOUS; SUBCUTANEOUS at 20:07

## 2022-07-30 RX ADMIN — Medication 10 ML: at 20:07

## 2022-07-30 RX ADMIN — METRONIDAZOLE 500 MG: 500 INJECTION, SOLUTION INTRAVENOUS at 13:43

## 2022-07-31 LAB
ANION GAP SERPL CALCULATED.3IONS-SCNC: 7.3 MMOL/L (ref 5–15)
BASOPHILS # BLD AUTO: 0.1 10*3/MM3 (ref 0–0.2)
BASOPHILS NFR BLD AUTO: 0.5 % (ref 0–1.5)
BUN SERPL-MCNC: 27 MG/DL (ref 8–23)
BUN/CREAT SERPL: 57.4 (ref 7–25)
CALCIUM SPEC-SCNC: 8.6 MG/DL (ref 8.6–10.5)
CHLORIDE SERPL-SCNC: 102 MMOL/L (ref 98–107)
CO2 SERPL-SCNC: 27.7 MMOL/L (ref 22–29)
CREAT SERPL-MCNC: 0.47 MG/DL (ref 0.57–1)
DEPRECATED RDW RBC AUTO: 50.5 FL (ref 37–54)
EGFRCR SERPLBLD CKD-EPI 2021: 102.6 ML/MIN/1.73
EOSINOPHIL # BLD AUTO: 0.56 10*3/MM3 (ref 0–0.4)
EOSINOPHIL NFR BLD AUTO: 3 % (ref 0.3–6.2)
ERYTHROCYTE [DISTWIDTH] IN BLOOD BY AUTOMATED COUNT: 15.5 % (ref 12.3–15.4)
GLUCOSE BLDC GLUCOMTR-MCNC: 190 MG/DL (ref 70–130)
GLUCOSE BLDC GLUCOMTR-MCNC: 197 MG/DL (ref 70–130)
GLUCOSE BLDC GLUCOMTR-MCNC: 241 MG/DL (ref 70–130)
GLUCOSE BLDC GLUCOMTR-MCNC: 247 MG/DL (ref 70–130)
GLUCOSE SERPL-MCNC: 201 MG/DL (ref 65–99)
HCT VFR BLD AUTO: 27.3 % (ref 34–46.6)
HGB BLD-MCNC: 8.5 G/DL (ref 12–15.9)
IMM GRANULOCYTES # BLD AUTO: 0.41 10*3/MM3 (ref 0–0.05)
IMM GRANULOCYTES NFR BLD AUTO: 2.2 % (ref 0–0.5)
LYMPHOCYTES # BLD AUTO: 3.98 10*3/MM3 (ref 0.7–3.1)
LYMPHOCYTES NFR BLD AUTO: 21.5 % (ref 19.6–45.3)
MCH RBC QN AUTO: 29.9 PG (ref 26.6–33)
MCHC RBC AUTO-ENTMCNC: 31.1 G/DL (ref 31.5–35.7)
MCV RBC AUTO: 96.1 FL (ref 79–97)
MONOCYTES # BLD AUTO: 0.91 10*3/MM3 (ref 0.1–0.9)
MONOCYTES NFR BLD AUTO: 4.9 % (ref 5–12)
NEUTROPHILS NFR BLD AUTO: 12.55 10*3/MM3 (ref 1.7–7)
NEUTROPHILS NFR BLD AUTO: 67.9 % (ref 42.7–76)
NRBC BLD AUTO-RTO: 0 /100 WBC (ref 0–0.2)
PLATELET # BLD AUTO: 553 10*3/MM3 (ref 140–450)
PMV BLD AUTO: 10.3 FL (ref 6–12)
POTASSIUM SERPL-SCNC: 4.5 MMOL/L (ref 3.5–5.2)
RBC # BLD AUTO: 2.84 10*6/MM3 (ref 3.77–5.28)
SODIUM SERPL-SCNC: 137 MMOL/L (ref 136–145)
WBC NRBC COR # BLD: 18.51 10*3/MM3 (ref 3.4–10.8)

## 2022-07-31 PROCEDURE — 63710000001 INSULIN DETEMIR PER 5 UNITS: Performed by: STUDENT IN AN ORGANIZED HEALTH CARE EDUCATION/TRAINING PROGRAM

## 2022-07-31 PROCEDURE — 85025 COMPLETE CBC W/AUTO DIFF WBC: CPT | Performed by: STUDENT IN AN ORGANIZED HEALTH CARE EDUCATION/TRAINING PROGRAM

## 2022-07-31 PROCEDURE — 99308 SBSQ NF CARE LOW MDM 20: CPT | Performed by: PHYSICIAN ASSISTANT

## 2022-07-31 PROCEDURE — 80048 BASIC METABOLIC PNL TOTAL CA: CPT | Performed by: STUDENT IN AN ORGANIZED HEALTH CARE EDUCATION/TRAINING PROGRAM

## 2022-07-31 PROCEDURE — 25010000002 CEFTRIAXONE PER 250 MG: Performed by: STUDENT IN AN ORGANIZED HEALTH CARE EDUCATION/TRAINING PROGRAM

## 2022-07-31 PROCEDURE — 82962 GLUCOSE BLOOD TEST: CPT

## 2022-07-31 PROCEDURE — 63710000001 INSULIN ASPART PER 5 UNITS: Performed by: STUDENT IN AN ORGANIZED HEALTH CARE EDUCATION/TRAINING PROGRAM

## 2022-07-31 PROCEDURE — 25010000002 HEPARIN (PORCINE) PER 1000 UNITS: Performed by: STUDENT IN AN ORGANIZED HEALTH CARE EDUCATION/TRAINING PROGRAM

## 2022-07-31 RX ADMIN — ROPINIROLE HYDROCHLORIDE 0.5 MG: 0.25 TABLET, FILM COATED ORAL at 20:24

## 2022-07-31 RX ADMIN — METRONIDAZOLE 500 MG: 500 INJECTION, SOLUTION INTRAVENOUS at 03:52

## 2022-07-31 RX ADMIN — HEPARIN SODIUM 5000 UNITS: 5000 INJECTION INTRAVENOUS; SUBCUTANEOUS at 08:39

## 2022-07-31 RX ADMIN — Medication 10 ML: at 20:26

## 2022-07-31 RX ADMIN — ASPIRIN 81 MG: 81 TABLET, CHEWABLE ORAL at 08:39

## 2022-07-31 RX ADMIN — Medication 20 ML: at 20:24

## 2022-07-31 RX ADMIN — ATORVASTATIN CALCIUM 40 MG: 40 TABLET, FILM COATED ORAL at 20:24

## 2022-07-31 RX ADMIN — HYDROCODONE BITARTRATE AND ACETAMINOPHEN 10 ML: 7.5; 325 SOLUTION ORAL at 04:28

## 2022-07-31 RX ADMIN — INSULIN ASPART 4 UNITS: 100 INJECTION, SOLUTION INTRAVENOUS; SUBCUTANEOUS at 17:14

## 2022-07-31 RX ADMIN — HEPARIN SODIUM 5000 UNITS: 5000 INJECTION INTRAVENOUS; SUBCUTANEOUS at 20:24

## 2022-07-31 RX ADMIN — METRONIDAZOLE 500 MG: 500 INJECTION, SOLUTION INTRAVENOUS at 21:14

## 2022-07-31 RX ADMIN — ISOSORBIDE MONONITRATE 30 MG: 30 TABLET, EXTENDED RELEASE ORAL at 08:39

## 2022-07-31 RX ADMIN — Medication 5000 UNITS: at 08:39

## 2022-07-31 RX ADMIN — METRONIDAZOLE 500 MG: 500 INJECTION, SOLUTION INTRAVENOUS at 13:06

## 2022-07-31 RX ADMIN — INSULIN ASPART 2 UNITS: 100 INJECTION, SOLUTION INTRAVENOUS; SUBCUTANEOUS at 11:35

## 2022-07-31 RX ADMIN — INSULIN ASPART 2 UNITS: 100 INJECTION, SOLUTION INTRAVENOUS; SUBCUTANEOUS at 08:38

## 2022-07-31 RX ADMIN — LOSARTAN POTASSIUM 50 MG: 50 TABLET, FILM COATED ORAL at 08:39

## 2022-07-31 RX ADMIN — Medication 10 ML: at 08:38

## 2022-07-31 RX ADMIN — LEVOTHYROXINE SODIUM 25 MCG: 25 TABLET ORAL at 06:01

## 2022-07-31 RX ADMIN — DONEPEZIL HYDROCHLORIDE 10 MG: 5 TABLET, FILM COATED ORAL at 20:24

## 2022-07-31 RX ADMIN — INSULIN DETEMIR 20 UNITS: 100 INJECTION, SOLUTION SUBCUTANEOUS at 20:24

## 2022-07-31 RX ADMIN — Medication 20 ML: at 13:07

## 2022-07-31 RX ADMIN — CEFTRIAXONE 2 G: 2 INJECTION, POWDER, FOR SOLUTION INTRAMUSCULAR; INTRAVENOUS at 13:06

## 2022-08-01 LAB
ANION GAP SERPL CALCULATED.3IONS-SCNC: 6.5 MMOL/L (ref 5–15)
BASOPHILS # BLD AUTO: 0.07 10*3/MM3 (ref 0–0.2)
BASOPHILS NFR BLD AUTO: 0.4 % (ref 0–1.5)
BUN SERPL-MCNC: 27 MG/DL (ref 8–23)
BUN/CREAT SERPL: 57.4 (ref 7–25)
CALCIUM SPEC-SCNC: 8.3 MG/DL (ref 8.6–10.5)
CHLORIDE SERPL-SCNC: 102 MMOL/L (ref 98–107)
CO2 SERPL-SCNC: 27.5 MMOL/L (ref 22–29)
CREAT SERPL-MCNC: 0.47 MG/DL (ref 0.57–1)
DEPRECATED RDW RBC AUTO: 50.9 FL (ref 37–54)
EGFRCR SERPLBLD CKD-EPI 2021: 102.6 ML/MIN/1.73
EOSINOPHIL # BLD AUTO: 0.43 10*3/MM3 (ref 0–0.4)
EOSINOPHIL NFR BLD AUTO: 2.6 % (ref 0.3–6.2)
ERYTHROCYTE [DISTWIDTH] IN BLOOD BY AUTOMATED COUNT: 15.6 % (ref 12.3–15.4)
GLUCOSE BLDC GLUCOMTR-MCNC: 142 MG/DL (ref 70–130)
GLUCOSE BLDC GLUCOMTR-MCNC: 195 MG/DL (ref 70–130)
GLUCOSE BLDC GLUCOMTR-MCNC: 234 MG/DL (ref 70–130)
GLUCOSE BLDC GLUCOMTR-MCNC: 311 MG/DL (ref 70–130)
GLUCOSE SERPL-MCNC: 183 MG/DL (ref 65–99)
HCT VFR BLD AUTO: 24.9 % (ref 34–46.6)
HGB BLD-MCNC: 7.8 G/DL (ref 12–15.9)
IMM GRANULOCYTES # BLD AUTO: 0.32 10*3/MM3 (ref 0–0.05)
IMM GRANULOCYTES NFR BLD AUTO: 1.9 % (ref 0–0.5)
LYMPHOCYTES # BLD AUTO: 2.8 10*3/MM3 (ref 0.7–3.1)
LYMPHOCYTES NFR BLD AUTO: 16.9 % (ref 19.6–45.3)
MCH RBC QN AUTO: 30.4 PG (ref 26.6–33)
MCHC RBC AUTO-ENTMCNC: 31.3 G/DL (ref 31.5–35.7)
MCV RBC AUTO: 96.9 FL (ref 79–97)
MONOCYTES # BLD AUTO: 0.88 10*3/MM3 (ref 0.1–0.9)
MONOCYTES NFR BLD AUTO: 5.3 % (ref 5–12)
NEUTROPHILS NFR BLD AUTO: 12.04 10*3/MM3 (ref 1.7–7)
NEUTROPHILS NFR BLD AUTO: 72.9 % (ref 42.7–76)
NRBC BLD AUTO-RTO: 0 /100 WBC (ref 0–0.2)
PLATELET # BLD AUTO: 471 10*3/MM3 (ref 140–450)
PMV BLD AUTO: 10.3 FL (ref 6–12)
POTASSIUM SERPL-SCNC: 4.2 MMOL/L (ref 3.5–5.2)
RBC # BLD AUTO: 2.57 10*6/MM3 (ref 3.77–5.28)
SODIUM SERPL-SCNC: 136 MMOL/L (ref 136–145)
WBC NRBC COR # BLD: 16.54 10*3/MM3 (ref 3.4–10.8)

## 2022-08-01 PROCEDURE — 99308 SBSQ NF CARE LOW MDM 20: CPT | Performed by: INTERNAL MEDICINE

## 2022-08-01 PROCEDURE — 25010000002 CEFTRIAXONE PER 250 MG: Performed by: STUDENT IN AN ORGANIZED HEALTH CARE EDUCATION/TRAINING PROGRAM

## 2022-08-01 PROCEDURE — 63710000001 INSULIN ASPART PER 5 UNITS: Performed by: STUDENT IN AN ORGANIZED HEALTH CARE EDUCATION/TRAINING PROGRAM

## 2022-08-01 PROCEDURE — 99310 SBSQ NF CARE HIGH MDM 45: CPT | Performed by: HOSPITALIST

## 2022-08-01 PROCEDURE — 82962 GLUCOSE BLOOD TEST: CPT

## 2022-08-01 PROCEDURE — 25010000002 HEPARIN (PORCINE) PER 1000 UNITS: Performed by: STUDENT IN AN ORGANIZED HEALTH CARE EDUCATION/TRAINING PROGRAM

## 2022-08-01 PROCEDURE — 63710000001 INSULIN DETEMIR PER 5 UNITS: Performed by: STUDENT IN AN ORGANIZED HEALTH CARE EDUCATION/TRAINING PROGRAM

## 2022-08-01 PROCEDURE — 85025 COMPLETE CBC W/AUTO DIFF WBC: CPT | Performed by: STUDENT IN AN ORGANIZED HEALTH CARE EDUCATION/TRAINING PROGRAM

## 2022-08-01 PROCEDURE — 80048 BASIC METABOLIC PNL TOTAL CA: CPT | Performed by: STUDENT IN AN ORGANIZED HEALTH CARE EDUCATION/TRAINING PROGRAM

## 2022-08-01 RX ADMIN — Medication 5000 UNITS: at 09:21

## 2022-08-01 RX ADMIN — METRONIDAZOLE 500 MG: 500 INJECTION, SOLUTION INTRAVENOUS at 04:23

## 2022-08-01 RX ADMIN — INSULIN ASPART 4 UNITS: 100 INJECTION, SOLUTION INTRAVENOUS; SUBCUTANEOUS at 11:40

## 2022-08-01 RX ADMIN — ATORVASTATIN CALCIUM 40 MG: 40 TABLET, FILM COATED ORAL at 21:42

## 2022-08-01 RX ADMIN — INSULIN ASPART 7 UNITS: 100 INJECTION, SOLUTION INTRAVENOUS; SUBCUTANEOUS at 17:50

## 2022-08-01 RX ADMIN — HYDROCODONE BITARTRATE AND ACETAMINOPHEN 10 ML: 7.5; 325 SOLUTION ORAL at 04:23

## 2022-08-01 RX ADMIN — ROPINIROLE HYDROCHLORIDE 0.5 MG: 0.25 TABLET, FILM COATED ORAL at 21:42

## 2022-08-01 RX ADMIN — Medication 20 ML: at 09:34

## 2022-08-01 RX ADMIN — METRONIDAZOLE 500 MG: 500 INJECTION, SOLUTION INTRAVENOUS at 21:40

## 2022-08-01 RX ADMIN — CEFTRIAXONE 2 G: 2 INJECTION, POWDER, FOR SOLUTION INTRAMUSCULAR; INTRAVENOUS at 13:32

## 2022-08-01 RX ADMIN — HEPARIN SODIUM 5000 UNITS: 5000 INJECTION INTRAVENOUS; SUBCUTANEOUS at 09:21

## 2022-08-01 RX ADMIN — HEPARIN SODIUM 5000 UNITS: 5000 INJECTION INTRAVENOUS; SUBCUTANEOUS at 21:41

## 2022-08-01 RX ADMIN — Medication 20 ML: at 21:41

## 2022-08-01 RX ADMIN — INSULIN DETEMIR 20 UNITS: 100 INJECTION, SOLUTION SUBCUTANEOUS at 21:59

## 2022-08-01 RX ADMIN — Medication 10 ML: at 21:41

## 2022-08-01 RX ADMIN — DONEPEZIL HYDROCHLORIDE 10 MG: 5 TABLET, FILM COATED ORAL at 21:42

## 2022-08-01 RX ADMIN — LOSARTAN POTASSIUM 50 MG: 50 TABLET, FILM COATED ORAL at 09:21

## 2022-08-01 RX ADMIN — Medication 10 ML: at 09:29

## 2022-08-01 RX ADMIN — LEVOTHYROXINE SODIUM 25 MCG: 25 TABLET ORAL at 05:40

## 2022-08-01 RX ADMIN — ISOSORBIDE MONONITRATE 30 MG: 30 TABLET, EXTENDED RELEASE ORAL at 09:21

## 2022-08-01 RX ADMIN — ASPIRIN 81 MG: 81 TABLET, CHEWABLE ORAL at 09:21

## 2022-08-01 RX ADMIN — METRONIDAZOLE 500 MG: 500 INJECTION, SOLUTION INTRAVENOUS at 13:32

## 2022-08-01 NOTE — PROGRESS NOTES
Nutrition Services    Patient Name:  Yecenia Botello  YOB: 1950  MRN: [id number]  Admit Date:  7/30/2022    BMI: 22.85  Diet: Puree/ Consistent Carb  Intake: 0-25%  TF: Isosource 1.5 continuous, rate 50 ml/hr.  Intake: meeting needs   Fluid:meeting needs     Electronically signed by:  Elías Sears RD  08/01/22 12:37 EDT

## 2022-08-01 NOTE — CASE MANAGEMENT/SOCIAL WORK
Discharge Planning Assessment   Joaquin     Patient Name: Yecenia Botello  MRN: [id number]  Today's Date: 8/1/2022    Admit Date: 7/30/2022     Discharge Needs Assessment     Row Name 08/01/22 1608       Living Environment    People in Home child(farzad), adult    Name(s) of People in Home Yecenia Davis (dtR)    Current Living Arrangements home    Primary Care Provided by child(farzad)    Family Caregiver if Needed child(farzad), adult    Quality of Family Relationships helpful;involved;supportive       Transition Planning    Transportation Anticipated family or friend will provide       Discharge Needs Assessment    Equipment Currently Used at Home hospital bed;feeding device;lift device    Concerns to be Addressed discharge planning               Discharge Plan     Row Name 08/01/22 1610       Plan    Plan Pt was admitted to swing bed on 07/30/22. SS received Physician consult for swing bed. SS spoke with Pt's daughter, Yecenia on this date. Pt lives with her daughter, Yecenia and family plans for her to return home at discharge. Pt does not currently utilize home health services. [facility] past. Pt has a peg tube, tube feeding supplies, wheelchair, reinaldo lift, and hospital bed via [facility]. Pt's PCP is GOLDIE Ferrara. GOLDIE sees Pt in the home and works at [employer] office. Pt utilizes [facility] pharmacy. Pt does not have a POA/living will. Pt will need EMS to transport. SS to follow.                ZAK GonzalezW

## 2022-08-01 NOTE — PROGRESS NOTES
"Palliative Care Daily Progress Note     S: Clinically, the patient has had no significant change.  She is tolerating wound care with premedication prior to her wound care.  She continues to have tube feedings with minimal oral intake.  The wound is slowly getting better.    O:   Palliative Performance Scale Score:     /80 (BP Location: Right arm, Patient Position: Lying)   Pulse 92   Temp 98.7 °F (37.1 °C) (Oral)   Resp 20   Ht 162.6 cm (64.02\")   Wt 60.4 kg (133 lb 3.2 oz)   SpO2 98%   BMI 22.85 kg/m²     Intake/Output Summary (Last 24 hours) at 8/1/2022 1105  Last data filed at 8/1/2022 0500  Gross per 24 hour   Intake 1830 ml   Output 500 ml   Net 1330 ml       PE:  General Appearance:    Chronically ill appearing, nonverbal   HEENT:    NC/AT   Neck:   supple   Lungs:     Poor inspiratory effort    Heart:    RRR, 3/6 FUAD LLSB to URSB   Abdomen:     Soft, NT, non distended   Extremities:   no edema   Pulses:   Pulses palpable    Skin:   sacral wound   Neurologic:   Non verbal, opens eyes   Psych:   Calm         Meds: Reviewed Labs:   Results from last 7 days   Lab Units 08/01/22  0154   WBC 10*3/mm3 16.54*   HEMOGLOBIN g/dL 7.8*   HEMATOCRIT % 24.9*   PLATELETS 10*3/mm3 471*     Results from last 7 days   Lab Units 08/01/22  0154   SODIUM mmol/L 136   POTASSIUM mmol/L 4.2   CHLORIDE mmol/L 102   CO2 mmol/L 27.5   BUN mg/dL 27*   CREATININE mg/dL 0.47*   GLUCOSE mg/dL 183*   CALCIUM mg/dL 8.3*     Results from last 7 days   Lab Units 08/01/22  0154   SODIUM mmol/L 136   POTASSIUM mmol/L 4.2   CHLORIDE mmol/L 102   CO2 mmol/L 27.5   BUN mg/dL 27*   CREATININE mg/dL 0.47*   CALCIUM mg/dL 8.3*   GLUCOSE mg/dL 183*     Imaging Results (Last 72 Hours)     ** No results found for the last 72 hours. **            Diagnostics: Reviewed    A: Clinically, with improvement of sacral wound. Tolerating wound care.       P: Continue present management/wound care. Monitor pain, symptoms      We will continue to " follow along. Please do not hesitate to contact us regarding further sx mgmt or GOC needs, including after hours or on weekends via our on call provider at [phone].     Jf Cruz MD    8/1/2022

## 2022-08-01 NOTE — PROGRESS NOTES
[facility]     Patient Identification:  Name:  Yecenia Botello  Age:  71 y.o.  Sex:  female  :  1950  MRN:  [id number]  Visit Number:  [id number]  Primary Care Provider:  Johnathan Ashraf MD    Length of stay:  2    Subjective: Patient seen and examined, patient is nonverbal, she tracks and stares at me but does not respond, she moves her left upper extremity slowly but weak.  Surgery infectious disease follow-up appreciated.    Chief Complaint: Failure to thrive  ----------------------------------------------------------------------------------------------------------------------  Current Hospital Meds:  aspirin, 81 mg, Per PEG Tube, Daily  atorvastatin, 40 mg, Per PEG Tube, Nightly  cefTRIAXone, 2 g, Intravenous, Q24H  Dakins (full strength), 20 mL, Topical, Q12H  donepezil, 10 mg, Per PEG Tube, Nightly  heparin (porcine), 5,000 Units, Subcutaneous, Q12H  Insulin Aspart, 0-9 Units, Subcutaneous, TID AC  insulin detemir, 20 Units, Subcutaneous, Nightly  isosorbide mononitrate, 30 mg, Oral, Q24H  levothyroxine, 25 mcg, Per PEG Tube, QAM  losartan, 50 mg, Per PEG Tube, Q24H  metroNIDAZOLE, 500 mg, Intravenous, Q8H  rOPINIRole, 0.5 mg, Per PEG Tube, Nightly  sodium chloride, 10 mL, Intravenous, Q12H  [START ON 2022] tuberculin, 5 Units, Intradermal, Once  vitamin D3, 5,000 Units, Per PEG Tube, Daily         ----------------------------------------------------------------------------------------------------------------------  Vital Signs:  Temp:  [98.3 °F (36.8 °C)-98.7 °F (37.1 °C)] 98.7 °F (37.1 °C)  Heart Rate:  [83-92] 92  Resp:  [18-20] 20  BP: (101-142)/(59-80) 142/80       Tele:       22  1100 22  0500 22  0500   Weight: 62.1 kg (136 lb 14.4 oz) 61.1 kg (134 lb 12.8 oz) 60.4 kg (133 lb 3.2 oz)     Body mass index is 22.85 kg/m².    Intake/Output Summary (Last 24 hours) at 2022 1054  Last data filed at 2022 0500  Gross per 24 hour    Intake 1830 ml   Output 500 ml   Net 1330 ml     Diet Pureed; Consistent Carbohydrate  ----------------------------------------------------------------------------------------------------------------------  Physical exam:  General: Chronically ill-appearing, awake, nonverbal,   No respiratory distress.    Skin:  Skin is warm and dry. No rash noted. No pallor.    HENT:  Head:  Normocephalic and atraumatic.  Mouth:  Moist mucous membranes.    Eyes:  Conjunctivae, extraocular muscles unable to test.  Pupils are equal, round, and reactive to light.  No scleral icterus.    Neck:  Neck supple.  No JVD present.    Pulmonary/Chest:  No respiratory distress, no wheezes, no crackles, with normal breath sounds and good air movement.  Cardiovascular:  Normal rate, regular rhythm and normal heart sounds with no murmur.  Abdominal:  Soft.  Bowel sounds are normal.  No distension and no tenderness.   Extremities: Trace edema both lower extremity, pulses palpable and strong and equal.  Neurological: Left upper extremity moves slightly.  Nonverbal   genitourinary: External catheter in place  Back: Very large right paramedial ulcer with packing.  ----------------------------------------------------------------------------------------------------------------------  ----------------------------------------------------------------------------------------------------------------------      Results from last 7 days   Lab Units 08/01/22 0154 07/31/22 0755 07/30/22 0412 07/29/22  0011 07/28/22  1033   LACTATE mmol/L  --   --   --   --  2.3*   WBC 10*3/mm3 16.54* 18.51* 17.82*   < >  --    HEMOGLOBIN g/dL 7.8* 8.5* 8.8*   < >  --    HEMATOCRIT % 24.9* 27.3* 29.2*   < >  --    MCV fL 96.9 96.1 99.3*   < >  --    MCHC g/dL 31.3* 31.1* 30.1*   < >  --    PLATELETS 10*3/mm3 471* 553* 481*   < >  --     < > = values in this interval not displayed.         Results from last 7 days   Lab Units 08/01/22 0154 07/31/22 0755 07/30/22  0412  07/27/22  0134 07/25/22  2101   SODIUM mmol/L 136 137 136   < >  --    POTASSIUM mmol/L 4.2 4.5 4.9   < > 3.8   MAGNESIUM mg/dL  --   --   --   --  2.0   CHLORIDE mmol/L 102 102 103   < >  --    CO2 mmol/L 27.5 27.7 25.2   < >  --    BUN mg/dL 27* 27* 24*   < >  --    CREATININE mg/dL 0.47* 0.47* 0.43*   < >  --    CALCIUM mg/dL 8.3* 8.6 8.2*   < >  --    GLUCOSE mg/dL 183* 201* 246*   < >  --     < > = values in this interval not displayed.   Estimated Creatinine Clearance: 106.2 mL/min (A) (by C-G formula based on SCr of 0.47 mg/dL (L)).    No results found for: AMMONIA      Blood Culture   Date Value Ref Range Status   07/29/2022 No growth at 3 days  Preliminary   07/29/2022 No growth at 3 days  Preliminary     No results found for: URINECX  No results found for: WOUNDCX  No results found for: STOOLCX    I have personally looked at the labs and they are summarized above.  ----------------------------------------------------------------------------------------------------------------------  Imaging Results (Last 24 Hours)     ** No results found for the last 24 hours. **        ----------------------------------------------------------------------------------------------------------------------  Assessment and Plan:  -Very large sacral decubiti right paramedial status post debridement with presence of Bacteroides fragilis, E. coli, Klebsiella species on culture  -History of CVA none ambulatory nonverbal on my evaluation  -Diabetes type 2 with hyperglycemia  -Constipation resolved  -History of essential hypertension  -History of hypothyroid    Continue with wound care twice daily per surgery, Accu-Chek and sliding scale adjust regimen for diabetes as needed, recreational feeding, continue PEG tube feeding.      Disposition patient is on swing bed      Edwina Jose Dodson MD  08/01/22  10:54 EDT

## 2022-08-01 NOTE — PLAN OF CARE
Goal Outcome Evaluation:            PT has been resting in bed, wound care and central line dressing change completed. VSS, no SS of distress, will continue to monitor for changes.

## 2022-08-01 NOTE — PLAN OF CARE
Goal Outcome Evaluation:              Outcome Evaluation: Pt rested well. Wound care completed. No new issues noted at this time. Will continue plan of care.

## 2022-08-01 NOTE — PROGRESS NOTES
PROGRESS NOTE         Patient Identification:  Name:  Yecenia Botello  Age:  71 y.o.  Sex:  female  :  1950  MRN:  [id number]  Visit Number:  [id number]  Primary Care Provider:  Johnathan Ashraf MD         LOS: 2 days       ----------------------------------------------------------------------------------------------------------------------  Subjective       Chief Complaints:    No chief complaint on file.        Interval History:      The patient is sitting up in bed this morning on room air in no apparent distress.  Nonverbal at baseline.  Discussed case with nurse, Inez, who reports no acute changes overnight and no diarrhea yesterday or this morning.  Lungs are clear to auscultation bilaterally.  Mild edema of bilateral lower extremities.  Afebrile. WBC is improved at 16.54.  Blood cultures on 2022 are so far showing no growth at 2 days.    Review of Systems:    Unable to obtain.  Patient is nonverbal.  ----------------------------------------------------------------------------------------------------------------------      Objective       Current Hospital Meds:  aspirin, 81 mg, Per PEG Tube, Daily  atorvastatin, 40 mg, Per PEG Tube, Nightly  cefTRIAXone, 2 g, Intravenous, Q24H  Dakins (full strength), 20 mL, Topical, Q12H  donepezil, 10 mg, Per PEG Tube, Nightly  heparin (porcine), 5,000 Units, Subcutaneous, Q12H  Insulin Aspart, 0-9 Units, Subcutaneous, TID AC  insulin detemir, 20 Units, Subcutaneous, Nightly  isosorbide mononitrate, 30 mg, Oral, Q24H  levothyroxine, 25 mcg, Per PEG Tube, QAM  losartan, 50 mg, Per PEG Tube, Q24H  metroNIDAZOLE, 500 mg, Intravenous, Q8H  rOPINIRole, 0.5 mg, Per PEG Tube, Nightly  sodium chloride, 10 mL, Intravenous, Q12H  [START ON 2022] tuberculin, 5 Units, Intradermal, Once  vitamin D3, 5,000 Units, Per PEG Tube, Daily          ----------------------------------------------------------------------------------------------------------------------    Vital Signs:  Temp:  [98.3 °F (36.8 °C)-98.7 °F (37.1 °C)] 98.7 °F (37.1 °C)  Heart Rate:  [83-92] 92  Resp:  [18-20] 20  BP: (101-142)/(59-80) 142/80  Mean Arterial Pressure (Non-Invasive) for the past 24 hrs (Last 3 readings):   Noninvasive MAP (mmHg)   07/31/22 1819 69     SpO2 Percentage    07/31/22 1500 07/31/22 1819 08/01/22 0600   SpO2: 97% 96% 98%     SpO2:  [96 %-98 %] 98 %  on   ;   Device (Oxygen Therapy): room air    Body mass index is 22.85 kg/m².  Wt Readings from Last 3 Encounters:   08/01/22 60.4 kg (133 lb 3.2 oz)   07/30/22 61.5 kg (135 lb 8 oz)   02/26/15 95.7 kg (211 lb)        Intake/Output Summary (Last 24 hours) at 8/1/2022 0907  Last data filed at 8/1/2022 0500  Gross per 24 hour   Intake 1830 ml   Output 500 ml   Net 1330 ml     Diet Pureed; Consistent Carbohydrate  ----------------------------------------------------------------------------------------------------------------------      Physical Exam:       Constitutional: Elderly, chronically ill-appearing female is sitting up in bed in no apparent distress.  Appears very comfortable.  HENT:  Head: Normocephalic and atraumatic.  Mouth:  Moist mucous membranes.    Eyes:  Conjunctivae and EOM are normal.  No scleral icterus.  Neck:  Neck supple.  No JVD present.    Cardiovascular:  Normal rate, regular rhythm. No edema. 3/6 systolic murmur.  Pulmonary/Chest:  No respiratory distress, no wheezes, no crackles, with normal breath sounds and good air movement.  Abdominal: Abdomen is soft and nondistended with normal bowel sounds. Seems to be nontender, as there is no grimacing or guarding.  Musculoskeletal:  No edema, no tenderness, and no deformity.  No swelling or redness of joints.  Neurological:  Alert and awake.  Nonverbal.  No facial droop.  No slurred speech.   Skin:  Skin is warm and dry.  No rash noted.  No pallor.   Stage IV decubitus ulcer to sacrum, dressing in place.  Psychiatric:  Normal mood and affect.  Behavior is normal.  Calm and cooperative.    ----------------------------------------------------------------------------------------------------------------------            Results from last 7 days   Lab Units 08/01/22  0154 07/31/22  0755 07/30/22  0412 07/29/22  0011 07/28/22  1033   LACTATE mmol/L  --   --   --   --  2.3*   WBC 10*3/mm3 16.54* 18.51* 17.82*   < >  --    HEMOGLOBIN g/dL 7.8* 8.5* 8.8*   < >  --    HEMATOCRIT % 24.9* 27.3* 29.2*   < >  --    MCV fL 96.9 96.1 99.3*   < >  --    MCHC g/dL 31.3* 31.1* 30.1*   < >  --    PLATELETS 10*3/mm3 471* 553* 481*   < >  --     < > = values in this interval not displayed.     Results from last 7 days   Lab Units 08/01/22  0154 07/31/22  0755 07/30/22  0412 07/27/22  0134 07/25/22  2101   SODIUM mmol/L 136 137 136   < >  --    POTASSIUM mmol/L 4.2 4.5 4.9   < > 3.8   MAGNESIUM mg/dL  --   --   --   --  2.0   CHLORIDE mmol/L 102 102 103   < >  --    CO2 mmol/L 27.5 27.7 25.2   < >  --    BUN mg/dL 27* 27* 24*   < >  --    CREATININE mg/dL 0.47* 0.47* 0.43*   < >  --    CALCIUM mg/dL 8.3* 8.6 8.2*   < >  --    GLUCOSE mg/dL 183* 201* 246*   < >  --     < > = values in this interval not displayed.   Estimated Creatinine Clearance: 106.2 mL/min (A) (by C-G formula based on SCr of 0.47 mg/dL (L)).  No results found for: AMMONIA    Glucose   Date/Time Value Ref Range Status   08/01/2022 0631 142 (H) 70 - 130 mg/dL Final     Comment:     Meter: [id number] : [id number] Mayo Guillermina   07/31/2022 1926 247 (H) 70 - 130 mg/dL Final     Comment:     Meter: [id number] : [id number] Laila Light   07/31/2022 1631 241 (H) 70 - 130 mg/dL Final     Comment:     Meter: [id number] : [id number] ELAINE LABOY   07/31/2022 1015 190 (H) 70 - 130 mg/dL Final     Comment:     Meter: [id number] : [id number] KETTY SHERIFF   07/31/2022 0645 197 (H) 70 - 130 mg/dL Final      Comment:     Meter: [id number] : [id number] KETTY SHERIFF   07/30/2022 2011 207 (H) 70 - 130 mg/dL Final     Comment:     Meter: [id number] : [id number] NGOC MARIE   07/30/2022 1620 209 (H) 70 - 130 mg/dL Final     Comment:     Meter: [id number] : [id number] LESSARAHI SOUSA   07/30/2022 1126 301 (H) 70 - 130 mg/dL Final     Comment:     Meter: [id number] : [id number] LES SOUSA     Lab Results   Component Value Date    HGBA1C 7.20 (H) 07/22/2022     Lab Results   Component Value Date    TSH 3.130 02/26/2015    FREET4 0.78 (L) 02/26/2015       Blood Culture   Date Value Ref Range Status   07/23/2022 No growth at 24 hours  Preliminary   07/23/2022 No growth at 24 hours  Preliminary     No results found for: URINECX  Wound Culture   Date Value Ref Range Status   07/23/2022 Rare Gram Negative Bacilli (A)  Preliminary     No results found for: STOOLCX  No results found for: RESPCX  Pain Management Panel    There is no flowsheet data to display.           ----------------------------------------------------------------------------------------------------------------------  Imaging Results (Last 24 Hours)     ** No results found for the last 24 hours. **          ----------------------------------------------------------------------------------------------------------------------    Assessment/Plan       Assessment    Severe sepsis with lactic acid greater than 2 on admission  Infected sacral decubitus ulcer  UTI      Plan    The patient is sitting up in bed this morning on room air in no apparent distress.  Nonverbal at baseline.  Discussed case with nurse, Inez, who reports no acute changes overnight and no diarrhea yesterday or this morning.  Lungs are clear to auscultation bilaterally.  Mild edema of bilateral lower extremities.  Afebrile. WBC is improved at 16.54.  Blood cultures on 7/29/2022 are so far showing no growth at 2 days.    Status post excisional debridement on 7/23/22 of sacral  decubitus ulcer.  Operative report reports extensive tracking to the right hip with exposed bone.      Urinalysis from 7/23/2022 positive with culture finalized as greater than 100,000 colonies of E. coli and greater than 100,000 colonies of Klebsiella pneumoniae. Wound culture of the sacrum from 7/23/2022 finalized as pan susceptible E. coli.   Blood cultures on 7/23/2022 finalized as no growth.  COVID-19 influenza PCR negative.  CT of the lumbar spine, abdomen and pelvis reports sacral decubitus ulcer with fluid and gas collection noted in the right lower buttock and soft tissue gas and soft tissue changes noted to the left of the midline overlying the sacrum as well, these abnormalities are in contact with the distal coccyx and ostium myelitis is not excluded, large colonic stool burden, degenerative disc disease lumbar spine. MRI of the pelvis from 7/25/2022 reports large right paramedian sacral decubitus ulceration with associated soft tissue edema noted but no well-defined fluid collection, no MRI signal characteristics of the bony structures of the pelvis indicate changes of osteomyelitis, diffuse soft tissue edema likely cellulitis, trace ascites in the lower pelvis. 2D echo on 7/29/2022 made no mention of vegetations.  Anaerobic culture of sacrum wound on 7/23/2022 finalized as Bacteroides fragilis.    Patient was seen and examined by me today with Kerry Sierra PA-C and case was discussed with primary RN.    Fever has resolved after adding Flagyl as anaerobic culture of the sacrum wound finalized as Bacteroides fragilis, recommend to continue on Flagyl 500 mg IV every 8 hours x10 days.  This could be transitioned to oral option if patient were discharged prior to that time.    If patient continues with leukocytosis, might recommend to consider repeating CT abdomen and pelvis to rule out intra-abdominal pathology or ischemic bowel.     As MRI imaging failed to show evidence of osteomyelitis, would  attempt the shorter course of antibiotic therapy possible especially in the setting of lack of evidence of osteomyelitis with a total course of 10 days through August 4, 2022 with Rocephin 2 g IV every 24 hours that could be de-escalated to oral Omnicef on discharge.  With updated wound cultures, Flagyl 500 mg IV every 8 hours x10 days was also initiated on 7/30/2022 and patient could be de-escalated to oral Flagyl on discharge.  We will continue to follow closely.    Code Status:   Code Status and Medical Interventions:   Ordered at: 07/30/22 0946     Medical Intervention Limits:    NO intubation (DNI)     Level Of Support Discussed With:    Next of Kin (If No Surrogate)     Code Status (Patient has no pulse and is not breathing):    No CPR (Do Not Attempt to Resuscitate)     Medical Interventions (Patient has pulse or is breathing):    Limited Support     Comments:    pts daughter Yecenia Payne KOBI Sierra  08/01/22  09:57 EDT     Electronically signed by Tye Long MD, 08/01/22, 4:11 PM EDT.

## 2022-08-02 LAB
ANION GAP SERPL CALCULATED.3IONS-SCNC: 9.9 MMOL/L (ref 5–15)
BASOPHILS # BLD AUTO: 0.12 10*3/MM3 (ref 0–0.2)
BASOPHILS NFR BLD AUTO: 0.9 % (ref 0–1.5)
BUN SERPL-MCNC: 27 MG/DL (ref 8–23)
BUN/CREAT SERPL: 51.9 (ref 7–25)
CALCIUM SPEC-SCNC: 8.4 MG/DL (ref 8.6–10.5)
CHLORIDE SERPL-SCNC: 101 MMOL/L (ref 98–107)
CO2 SERPL-SCNC: 21.1 MMOL/L (ref 22–29)
CREAT SERPL-MCNC: 0.52 MG/DL (ref 0.57–1)
DEPRECATED RDW RBC AUTO: 56.8 FL (ref 37–54)
EGFRCR SERPLBLD CKD-EPI 2021: 100.1 ML/MIN/1.73
EOSINOPHIL # BLD AUTO: 0.14 10*3/MM3 (ref 0–0.4)
EOSINOPHIL NFR BLD AUTO: 1.1 % (ref 0.3–6.2)
ERYTHROCYTE [DISTWIDTH] IN BLOOD BY AUTOMATED COUNT: 17.1 % (ref 12.3–15.4)
GLUCOSE BLDC GLUCOMTR-MCNC: 132 MG/DL (ref 70–130)
GLUCOSE BLDC GLUCOMTR-MCNC: 158 MG/DL (ref 70–130)
GLUCOSE BLDC GLUCOMTR-MCNC: 199 MG/DL (ref 70–130)
GLUCOSE BLDC GLUCOMTR-MCNC: 255 MG/DL (ref 70–130)
GLUCOSE SERPL-MCNC: 219 MG/DL (ref 65–99)
HCT VFR BLD AUTO: 30.6 % (ref 34–46.6)
HGB BLD-MCNC: 9.1 G/DL (ref 12–15.9)
IMM GRANULOCYTES # BLD AUTO: 0.21 10*3/MM3 (ref 0–0.05)
IMM GRANULOCYTES NFR BLD AUTO: 1.6 % (ref 0–0.5)
LYMPHOCYTES # BLD AUTO: 1.95 10*3/MM3 (ref 0.7–3.1)
LYMPHOCYTES NFR BLD AUTO: 14.8 % (ref 19.6–45.3)
MCH RBC QN AUTO: 30.1 PG (ref 26.6–33)
MCHC RBC AUTO-ENTMCNC: 29.7 G/DL (ref 31.5–35.7)
MCV RBC AUTO: 101.3 FL (ref 79–97)
MONOCYTES # BLD AUTO: 1 10*3/MM3 (ref 0.1–0.9)
MONOCYTES NFR BLD AUTO: 7.6 % (ref 5–12)
NEUTROPHILS NFR BLD AUTO: 74 % (ref 42.7–76)
NEUTROPHILS NFR BLD AUTO: 9.78 10*3/MM3 (ref 1.7–7)
NRBC BLD AUTO-RTO: 0 /100 WBC (ref 0–0.2)
PLATELET # BLD AUTO: 379 10*3/MM3 (ref 140–450)
PMV BLD AUTO: 10.7 FL (ref 6–12)
POTASSIUM SERPL-SCNC: 5.2 MMOL/L (ref 3.5–5.2)
RBC # BLD AUTO: 3.02 10*6/MM3 (ref 3.77–5.28)
SARS-COV-2 RNA RESP QL NAA+PROBE: NOT DETECTED
SODIUM SERPL-SCNC: 132 MMOL/L (ref 136–145)
WBC NRBC COR # BLD: 13.2 10*3/MM3 (ref 3.4–10.8)

## 2022-08-02 PROCEDURE — 82962 GLUCOSE BLOOD TEST: CPT

## 2022-08-02 PROCEDURE — 99309 SBSQ NF CARE MODERATE MDM 30: CPT | Performed by: INTERNAL MEDICINE

## 2022-08-02 PROCEDURE — 99231 SBSQ HOSP IP/OBS SF/LOW 25: CPT | Performed by: SURGERY

## 2022-08-02 PROCEDURE — 85025 COMPLETE CBC W/AUTO DIFF WBC: CPT | Performed by: STUDENT IN AN ORGANIZED HEALTH CARE EDUCATION/TRAINING PROGRAM

## 2022-08-02 PROCEDURE — 87635 SARS-COV-2 COVID-19 AMP PRB: CPT | Performed by: SURGERY

## 2022-08-02 PROCEDURE — 25010000002 CEFTRIAXONE PER 250 MG: Performed by: STUDENT IN AN ORGANIZED HEALTH CARE EDUCATION/TRAINING PROGRAM

## 2022-08-02 PROCEDURE — 63710000001 INSULIN DETEMIR PER 5 UNITS: Performed by: STUDENT IN AN ORGANIZED HEALTH CARE EDUCATION/TRAINING PROGRAM

## 2022-08-02 PROCEDURE — 63710000001 INSULIN ASPART PER 5 UNITS: Performed by: STUDENT IN AN ORGANIZED HEALTH CARE EDUCATION/TRAINING PROGRAM

## 2022-08-02 PROCEDURE — 80048 BASIC METABOLIC PNL TOTAL CA: CPT | Performed by: STUDENT IN AN ORGANIZED HEALTH CARE EDUCATION/TRAINING PROGRAM

## 2022-08-02 PROCEDURE — 25010000002 HEPARIN (PORCINE) PER 1000 UNITS: Performed by: STUDENT IN AN ORGANIZED HEALTH CARE EDUCATION/TRAINING PROGRAM

## 2022-08-02 RX ADMIN — INSULIN ASPART 2 UNITS: 100 INJECTION, SOLUTION INTRAVENOUS; SUBCUTANEOUS at 08:28

## 2022-08-02 RX ADMIN — HYDROCODONE BITARTRATE AND ACETAMINOPHEN 10 ML: 7.5; 325 SOLUTION ORAL at 04:52

## 2022-08-02 RX ADMIN — INSULIN DETEMIR 20 UNITS: 100 INJECTION, SOLUTION SUBCUTANEOUS at 23:20

## 2022-08-02 RX ADMIN — ATORVASTATIN CALCIUM 40 MG: 40 TABLET, FILM COATED ORAL at 22:49

## 2022-08-02 RX ADMIN — Medication 10 ML: at 08:28

## 2022-08-02 RX ADMIN — Medication 20 ML: at 22:49

## 2022-08-02 RX ADMIN — HEPARIN SODIUM 5000 UNITS: 5000 INJECTION INTRAVENOUS; SUBCUTANEOUS at 08:28

## 2022-08-02 RX ADMIN — ROPINIROLE HYDROCHLORIDE 0.5 MG: 0.25 TABLET, FILM COATED ORAL at 22:50

## 2022-08-02 RX ADMIN — HEPARIN SODIUM 5000 UNITS: 5000 INJECTION INTRAVENOUS; SUBCUTANEOUS at 22:48

## 2022-08-02 RX ADMIN — Medication 10 ML: at 22:48

## 2022-08-02 RX ADMIN — METRONIDAZOLE 500 MG: 500 INJECTION, SOLUTION INTRAVENOUS at 03:44

## 2022-08-02 RX ADMIN — ASPIRIN 81 MG: 81 TABLET, CHEWABLE ORAL at 08:28

## 2022-08-02 RX ADMIN — CEFTRIAXONE 2 G: 2 INJECTION, POWDER, FOR SOLUTION INTRAMUSCULAR; INTRAVENOUS at 13:04

## 2022-08-02 RX ADMIN — Medication 5000 UNITS: at 08:28

## 2022-08-02 RX ADMIN — METRONIDAZOLE 500 MG: 500 INJECTION, SOLUTION INTRAVENOUS at 13:03

## 2022-08-02 RX ADMIN — INSULIN ASPART 2 UNITS: 100 INJECTION, SOLUTION INTRAVENOUS; SUBCUTANEOUS at 13:03

## 2022-08-02 RX ADMIN — METRONIDAZOLE 500 MG: 500 INJECTION, SOLUTION INTRAVENOUS at 22:47

## 2022-08-02 RX ADMIN — LEVOTHYROXINE SODIUM 25 MCG: 25 TABLET ORAL at 04:51

## 2022-08-02 RX ADMIN — Medication 20 ML: at 08:45

## 2022-08-02 RX ADMIN — DONEPEZIL HYDROCHLORIDE 10 MG: 5 TABLET, FILM COATED ORAL at 22:50

## 2022-08-02 NOTE — PLAN OF CARE
Goal Outcome Evaluation:              Outcome Evaluation: Pt rested well throughout the day. VSS. Wound care complete. Will continue plan of care.

## 2022-08-02 NOTE — PROGRESS NOTES
PROGRESS NOTE         Patient Identification:  Name:  Yecenia Botello  Age:  71 y.o.  Sex:  female  :  1950  MRN:  [id number]  Visit Number:  [id number]  Primary Care Provider:  Johnathan Ashraf MD         LOS: 3 days       ----------------------------------------------------------------------------------------------------------------------  Subjective       Chief Complaints:    No chief complaint on file.        Interval History:      The patient is asleep during assessment this morning and appears very comfortable.  Nonverbal at baseline.  Discussed case with primary RN, Inez, who reports no acute changes overnight and no new episodes of diarrhea. Lungs are clear to auscultation bilaterally.  Mild edema of bilateral lower extremities is unchanged.  Afebrile. WBC is improved at 13.20.  Blood cultures on 2022 are so far showing no growth at 3 days.    Review of Systems:    Unable to obtain.  Patient is nonverbal.  ----------------------------------------------------------------------------------------------------------------------      Objective       Current Hospital Meds:  aspirin, 81 mg, Per PEG Tube, Daily  atorvastatin, 40 mg, Per PEG Tube, Nightly  cefTRIAXone, 2 g, Intravenous, Q24H  Dakins (full strength), 20 mL, Topical, Q12H  donepezil, 10 mg, Per PEG Tube, Nightly  heparin (porcine), 5,000 Units, Subcutaneous, Q12H  Insulin Aspart, 0-9 Units, Subcutaneous, TID AC  insulin detemir, 20 Units, Subcutaneous, Nightly  isosorbide mononitrate, 30 mg, Oral, Q24H  levothyroxine, 25 mcg, Per PEG Tube, QAM  losartan, 50 mg, Per PEG Tube, Q24H  metroNIDAZOLE, 500 mg, Intravenous, Q8H  rOPINIRole, 0.5 mg, Per PEG Tube, Nightly  sodium chloride, 10 mL, Intravenous, Q12H  [START ON 2022] tuberculin, 5 Units, Intradermal, Once  vitamin D3, 5,000 Units, Per PEG Tube, Daily          ----------------------------------------------------------------------------------------------------------------------    Vital Signs:  Temp:  [98.8 °F (37.1 °C)-99.7 °F (37.6 °C)] 99.7 °F (37.6 °C)  Heart Rate:  [83-84] 84  Resp:  [16-18] 16  BP: ()/(50-63) 95/60  No data found.  SpO2 Percentage    07/31/22 1819 08/01/22 0600 08/01/22 1900   SpO2: 96% 98% 97%     SpO2:  [97 %] 97 %  on   ;   Device (Oxygen Therapy): room air    Body mass index is 23.09 kg/m².  Wt Readings from Last 3 Encounters:   08/02/22 61.1 kg (134 lb 9.6 oz)   07/30/22 61.5 kg (135 lb 8 oz)   02/26/15 95.7 kg (211 lb)        Intake/Output Summary (Last 24 hours) at 8/2/2022 1010  Last data filed at 8/2/2022 0700  Gross per 24 hour   Intake 2475.79 ml   Output 750 ml   Net 1725.79 ml     Diet Pureed; Consistent Carbohydrate  ----------------------------------------------------------------------------------------------------------------------      Physical Exam:       Constitutional: Elderly, chronically ill-appearing female resting comfortably in bed in no apparent distress.  Asleep during assessment and appears very comfortable.  HENT:  Head: Normocephalic and atraumatic.  Mouth:  Moist mucous membranes.    Eyes:  Conjunctivae and EOM are normal.  No scleral icterus.  Neck:  Neck supple.  No JVD present.    Cardiovascular:  Normal rate, regular rhythm. No edema. 3/6 systolic murmur.  Pulmonary/Chest:  No respiratory distress, no wheezes, no crackles, with normal breath sounds and good air movement.  Abdominal: Abdomen is soft and nondistended with normal bowel sounds. Seems to be nontender, as there is no grimacing or guarding.  Musculoskeletal:  No edema, no tenderness, and no deformity.  No swelling or redness of joints.  Neurological:   Asleep during assessment.  Nonverbal.  No facial droop.   Skin:  Skin is warm and dry.  No rash noted.  No pallor.  Stage IV decubitus ulcer to sacrum, dressing in place.  Psychiatric:   Asleep  during assessment.    ----------------------------------------------------------------------------------------------------------------------            Results from last 7 days   Lab Units 08/02/22 0130 08/01/22  0154 07/31/22  0755 07/29/22  0011 07/28/22  1033   LACTATE mmol/L  --   --   --   --  2.3*   WBC 10*3/mm3 13.20* 16.54* 18.51*   < >  --    HEMOGLOBIN g/dL 9.1* 7.8* 8.5*   < >  --    HEMATOCRIT % 30.6* 24.9* 27.3*   < >  --    MCV fL 101.3* 96.9 96.1   < >  --    MCHC g/dL 29.7* 31.3* 31.1*   < >  --    PLATELETS 10*3/mm3 379 471* 553*   < >  --     < > = values in this interval not displayed.     Results from last 7 days   Lab Units 08/02/22 0130 08/01/22 0154 07/31/22  0755   SODIUM mmol/L 132* 136 137   POTASSIUM mmol/L 5.2 4.2 4.5   CHLORIDE mmol/L 101 102 102   CO2 mmol/L 21.1* 27.5 27.7   BUN mg/dL 27* 27* 27*   CREATININE mg/dL 0.52* 0.47* 0.47*   CALCIUM mg/dL 8.4* 8.3* 8.6   GLUCOSE mg/dL 219* 183* 201*   Estimated Creatinine Clearance: 97.1 mL/min (A) (by C-G formula based on SCr of 0.52 mg/dL (L)).  No results found for: AMMONIA    Glucose   Date/Time Value Ref Range Status   08/02/2022 0646 199 (H) 70 - 130 mg/dL Final     Comment:     Meter: [id number] : [id number] JENNY RIVERA   08/01/2022 2145 195 (H) 70 - 130 mg/dL Final     Comment:     Meter: [id number] : [id number] chika beltrán   08/01/2022 1619 311 (H) 70 - 130 mg/dL Final     Comment:     Meter: [id number] : [id number] ELAINE LABOY   08/01/2022 1058 234 (H) 70 - 130 mg/dL Final     Comment:     Meter: [id number] : [id number] ELAINE LABOY   08/01/2022 0631 142 (H) 70 - 130 mg/dL Final     Comment:     Meter: [id number] : [id number] Mayo Ashleyah   07/31/2022 1926 247 (H) 70 - 130 mg/dL Final     Comment:     Meter: [id number] : [id number] Laila Light   07/31/2022 1631 241 (H) 70 - 130 mg/dL Final     Comment:     Meter: [id number] : [id number] ELAINE HATMAORTEGA   07/31/2022 1015 190 (H) 70  - 130 mg/dL Final     Comment:     Meter: [id number] : [id number] KETTY SHERIFF     Lab Results   Component Value Date    HGBA1C 7.20 (H) 07/22/2022     Lab Results   Component Value Date    TSH 3.130 02/26/2015    FREET4 0.78 (L) 02/26/2015       Blood Culture   Date Value Ref Range Status   07/23/2022 No growth at 24 hours  Preliminary   07/23/2022 No growth at 24 hours  Preliminary     No results found for: URINECX  Wound Culture   Date Value Ref Range Status   07/23/2022 Rare Gram Negative Bacilli (A)  Preliminary     No results found for: STOOLCX  No results found for: RESPCX  Pain Management Panel    There is no flowsheet data to display.           ----------------------------------------------------------------------------------------------------------------------  Imaging Results (Last 24 Hours)     ** No results found for the last 24 hours. **          ----------------------------------------------------------------------------------------------------------------------    Assessment/Plan     I have seen and examined the patient myself this morning with Kerry Sierra PA-C and case was discussed with primary RN Laura.    Overall patient seems to be comfortable on room air with no apparent distress and lungs continue to be clear to auscultation bilaterally with mild edema to bilateral lower extremities.  Patient is afebrile and WBC has improved down to 16,000 and blood culture from 7/29/2022 so far showing no growth.    1.  Infected sacral decubitus ulcer    With culture showing growth of Bacteroides fragilis, I added coverage with Flagyl 500 mg IV every 8 hours x10 days and can transition coverage to oral option upon discharge and to continue with the Rocephin/oral Omnicef through August 4, 2022    2.  Severe sepsis with lactic acid greater than 2 on admission.    At this time patient seems to be stable with no evidence suggestive of clinical sepsis or hemodynamic instability we will continue to  follow closely    3.  New onset diarrhea.    Diarrhea seems to have improved since the addition of Flagyl and will continue to follow closely as patient is at high risk for C. difficile colitis and would check C. difficile toxin PCR if persistent diarrhea.      Code Status:   Code Status and Medical Interventions:   Ordered at: 07/30/22 0946     Medical Intervention Limits:    NO intubation (DNI)     Level Of Support Discussed With:    Next of Kin (If No Surrogate)     Code Status (Patient has no pulse and is not breathing):    No CPR (Do Not Attempt to Resuscitate)     Medical Interventions (Patient has pulse or is breathing):    Limited Support     Comments:    pts daughter Yecenia       Kerry Sierra PA-C  08/02/22  10:10 EDT     Electronically signed by Kerry Sierra PA-C, 08/02/22, 10:13 AM EDT.  Electronically signed by Tye Long MD, 08/02/22, 1:19 PM EDT.

## 2022-08-02 NOTE — PLAN OF CARE
Goal Outcome Evaluation:  Plan of Care Reviewed With: patient        Progress: no change  Outcome Evaluation: Pt resting, no changes, will continue to monitor

## 2022-08-02 NOTE — PROGRESS NOTES
Surgery follow up for large sacral wound.    Patient is nonverbal and could not provide a history.    History obtained from nurse Hill.    Wound examined.  There is too much tunneling to place a wound VAC.  However overall the wound is granulating well and a significant portion could be secondarily closed.    Plan return to the OR on 8/3/2022 for wound debridement and partial secondary closure

## 2022-08-02 NOTE — CONSULTS
Consult Note     Patient Identification:  Name:  Yecenia Botello  Age:  71 y.o.  Sex:  female  :  1950  MRN:  [id number]   Visit Number:  [id number]  Primary Care Physician:  Johnathan Ashraf MD     Subjective     Chief complaint:     Pressure injury     History of presenting illness:     Patient is a 71 y.o. female with history of stroke with significant residual deficits, now non-ambulatory and minimally verbal, who presents to ED on 2022 with complaints of worsening sacral decubitus ulcer per family. She was reportedly admitted to [facility] in 2022 for MARLON and failure to thrive, during which time she developed a sacral decubitus ulcer for which she was sent home with silvadene dressings and wound care instructions. Her family has been treating with  honey. However, her wound has gotten worse with increasing exudative drainage that is foul smelling. She had area debrided on 2022 by Dr. Ivory. Penrose drain placed to right gluteal. Please note history obtained from chart review and NATIVIDAD Hill as patient is unable to update HPI.    ---------------------------------------------------------------------------------------------------------------------   Review of Systems:  Review of Systems   Unable to perform ROS: Patient nonverbal      ---------------------------------------------------------------------------------------------------------------------   Past Medical History:   Diagnosis Date   • Stroke (cerebrum) (HCC)      Past Surgical History:   Procedure Laterality Date   • DEBRIDEMENT OF ISCHIAL ULCER/BUTTOCKS WOUND N/A 2022    Procedure: DEBRIDEMENT OF ISCHIAL ULCER/BUTTOCKS WOUND;  Surgeon: Daja Ivory MD;  Location: [facility];  Service: General;  Laterality: N/A;     No family history on file.  Social History     Socioeconomic History   • Marital status:    Tobacco Use   • Smoking status: Never Smoker   Vaping Use   • Vaping Use: Unknown   Substance and  Sexual Activity   • Alcohol use: Defer   • Drug use: Defer   • Sexual activity: Defer     ---------------------------------------------------------------------------------------------------------------------   Allergies:  Patient has no known allergies.  ---------------------------------------------------------------------------------------------------------------------   Medications below are reported home medications pulling from within the system; at this time, these medications have not been reconciled unless otherwise specified and are in the verification process for further verifcation as current home medications.    Prior to Admission Medications     Prescriptions Last Dose Informant Patient Reported? Taking?    amLODIPine (NORVASC) 10 MG tablet Unknown Child Yes No    Take 10 mg by mouth Daily.    aspirin 81 MG EC tablet Unknown Child Yes No    Take 81 mg by mouth Daily.    atorvastatin (LIPITOR) 40 MG tablet Unknown Child Yes No    Take 40 mg by mouth Every Night.    docusate sodium (COLACE) 100 MG capsule Unknown Child Yes No    Take 100 mg by mouth 2 (Two) Times a Day.    donepezil (ARICEPT) 10 MG tablet Unknown Child Yes No    Take 10 mg by mouth Every Night.    gabapentin (NEURONTIN) 300 MG capsule Unknown Child Yes No    Take 300 mg by mouth 2 (Two) Times a Day.    Insulin Glargine (BASAGLAR KWIKPEN) 100 UNIT/ML injection pen Unknown Child Yes No    Inject 30 Units under the skin into the appropriate area as directed Every Night.    Insulin Lispro (humaLOG) 100 UNIT/ML injection Unknown Child Yes No    Inject 6 Units under the skin into the appropriate area as directed 3 (Three) Times a Day Before Meals.    isosorbide mononitrate (IMDUR) 30 MG 24 hr tablet Unknown Child Yes No    Take 30 mg by mouth Daily.    levothyroxine (SYNTHROID, LEVOTHROID) 25 MCG tablet Unknown Child Yes No    Take 25 mcg by mouth Daily.    rOPINIRole (REQUIP) 0.5 MG tablet Unknown Child Yes No    Take 0.5 mg by mouth Every  Night. Take 1 hour before bedtime.    vitamin D3 125 MCG (5000 UT) capsule capsule Unknown Child Yes No    Take 5,000 Units by mouth Daily.        ---------------------------------------------------------------------------------------------------------------------    Objective     Hospital Scheduled Meds:  aspirin, 81 mg, Per PEG Tube, Daily  atorvastatin, 40 mg, Per PEG Tube, Nightly  cefTRIAXone, 2 g, Intravenous, Q24H  Dakins (full strength), 20 mL, Topical, Q12H  donepezil, 10 mg, Per PEG Tube, Nightly  heparin (porcine), 5,000 Units, Subcutaneous, Q12H  Insulin Aspart, 0-9 Units, Subcutaneous, TID AC  insulin detemir, 20 Units, Subcutaneous, Nightly  isosorbide mononitrate, 30 mg, Oral, Q24H  levothyroxine, 25 mcg, Per PEG Tube, QAM  losartan, 50 mg, Per PEG Tube, Q24H  metroNIDAZOLE, 500 mg, Intravenous, Q8H  rOPINIRole, 0.5 mg, Per PEG Tube, Nightly  sodium chloride, 10 mL, Intravenous, Q12H  [START ON 8/13/2022] tuberculin, 5 Units, Intradermal, Once  vitamin D3, 5,000 Units, Per PEG Tube, Daily           Current listed hospital scheduled medications may not yet reflect those currently placed in orders that are signed and held, awaiting patient's arrival to floor/unit.    ---------------------------------------------------------------------------------------------------------------------   Vital Signs:  Temp:  [98.8 °F (37.1 °C)-99.7 °F (37.6 °C)] 99.7 °F (37.6 °C)  Heart Rate:  [83-84] 84  Resp:  [16-18] 16  BP: ()/(50-63) 95/60  No data found.  SpO2 Percentage    07/31/22 1819 08/01/22 0600 08/01/22 1900   SpO2: 96% 98% 97%     SpO2:  [97 %] 97 %  on   ;   Device (Oxygen Therapy): room air    Body mass index is 23.09 kg/m².  Wt Readings from Last 3 Encounters:   08/02/22 61.1 kg (134 lb 9.6 oz)   07/30/22 61.5 kg (135 lb 8 oz)   02/26/15 95.7 kg (211 lb)       ---------------------------------------------------------------------------------------------------------------------   Physical  Exam:  Physical Exam  Constitutional: Vital sign were reviewed (temperature, pulse, respiration, and blood pressure) and found to be within expected limits, general appearance was assessed and the patient was found to be in no distress and calm and comfortable appears  Eyes:lids and lashes normal  Ears, Nose, Mouth, Throat:neck normal  Neck: shows normal range of motion without pain, and no evidence of trauma or deformity  and trachea is midline   Respiratory: Normal respiratory effort and symmetrical chest expansion  Cardiovascular:pulses normal, and intact distal pulses dorsal-pedis  palpable and posteria-tib   palpable2+ lower bilateral  Gastrointestinal:no masses and no tenderness  Musculoskeletal: inspection of digits and nails shows normal findings  and no edema   Neurologic: Mental Status unable to assess, patient non-verbal  Skin: Temperature:normal turgor and temperatureColor: normal, no cyanosis, jaundice, pallor or bruising, Moisture: dry,Nails: thickened yellow toenails bed, Hair:thinning to lower extremities .  Sacral pressure injury- base red and moist. Small areas of slough present. Edges moist. Tunneling into right gluteal. Penrose drain in place. Periwound pink and intact.  Multiple skin tears to bilateral arms    Assessment & Plan      Stage 4 pressure injury to sacrum-   -Surgery plans to take patient back to OR tomorrow.     -Continue to pack with dakin's moisten gauze at this time.      -Recommended wound vac application to area, will  re-evaluate on Thursday after further surgical intervention.    -Pressure preventive measures in place per facility protocol.      -Turn Q2, utilize wedge for additional offload measures.    Skin tears to arms-    -continue protocol  Diabetes   -Recommend adequate glycemic control to help promote  wound healing   -Primary team managing    Recommend marcin protein diet 120g/day along with vitamin C 2000mg/day, vitamin A 5000 Units/day, vitamin D3 5000 Units/day,  zinc 50mg/day to help promote wound healing     Recommend follow-up in outpatient wound clinic once stable for discharge.    GOLDIE Wang   [facility]  08/02/22  15:48 EDT

## 2022-08-03 ENCOUNTER — ANESTHESIA EVENT (OUTPATIENT)
Dept: PERIOP | Facility: HOSPITAL | Age: 72
End: 2022-08-03

## 2022-08-03 ENCOUNTER — ANESTHESIA (OUTPATIENT)
Dept: PERIOP | Facility: HOSPITAL | Age: 72
End: 2022-08-03

## 2022-08-03 LAB
ANION GAP SERPL CALCULATED.3IONS-SCNC: 7.3 MMOL/L (ref 5–15)
BACTERIA SPEC AEROBE CULT: NORMAL
BACTERIA SPEC AEROBE CULT: NORMAL
BASOPHILS # BLD AUTO: 0.05 10*3/MM3 (ref 0–0.2)
BASOPHILS NFR BLD AUTO: 0.6 % (ref 0–1.5)
BUN SERPL-MCNC: 24 MG/DL (ref 8–23)
BUN/CREAT SERPL: 52.2 (ref 7–25)
CALCIUM SPEC-SCNC: 7.8 MG/DL (ref 8.6–10.5)
CHLORIDE SERPL-SCNC: 99 MMOL/L (ref 98–107)
CO2 SERPL-SCNC: 25.7 MMOL/L (ref 22–29)
CREAT SERPL-MCNC: 0.46 MG/DL (ref 0.57–1)
DEPRECATED RDW RBC AUTO: 55.6 FL (ref 37–54)
EGFRCR SERPLBLD CKD-EPI 2021: 103.1 ML/MIN/1.73
EOSINOPHIL # BLD AUTO: 0.04 10*3/MM3 (ref 0–0.4)
EOSINOPHIL NFR BLD AUTO: 0.5 % (ref 0.3–6.2)
ERYTHROCYTE [DISTWIDTH] IN BLOOD BY AUTOMATED COUNT: 16.9 % (ref 12.3–15.4)
GLUCOSE BLDC GLUCOMTR-MCNC: 203 MG/DL (ref 70–130)
GLUCOSE BLDC GLUCOMTR-MCNC: 82 MG/DL (ref 70–130)
GLUCOSE BLDC GLUCOMTR-MCNC: 84 MG/DL (ref 70–130)
GLUCOSE SERPL-MCNC: 240 MG/DL (ref 65–99)
HCT VFR BLD AUTO: 24.4 % (ref 34–46.6)
HGB BLD-MCNC: 7.8 G/DL (ref 12–15.9)
IMM GRANULOCYTES # BLD AUTO: 0.08 10*3/MM3 (ref 0–0.05)
IMM GRANULOCYTES NFR BLD AUTO: 0.9 % (ref 0–0.5)
LYMPHOCYTES # BLD AUTO: 1.76 10*3/MM3 (ref 0.7–3.1)
LYMPHOCYTES NFR BLD AUTO: 20.5 % (ref 19.6–45.3)
MCH RBC QN AUTO: 31.2 PG (ref 26.6–33)
MCHC RBC AUTO-ENTMCNC: 32 G/DL (ref 31.5–35.7)
MCV RBC AUTO: 97.6 FL (ref 79–97)
MONOCYTES # BLD AUTO: 0.59 10*3/MM3 (ref 0.1–0.9)
MONOCYTES NFR BLD AUTO: 6.9 % (ref 5–12)
NEUTROPHILS NFR BLD AUTO: 6.05 10*3/MM3 (ref 1.7–7)
NEUTROPHILS NFR BLD AUTO: 70.6 % (ref 42.7–76)
NRBC BLD AUTO-RTO: 0 /100 WBC (ref 0–0.2)
PLATELET # BLD AUTO: 328 10*3/MM3 (ref 140–450)
PMV BLD AUTO: 10.8 FL (ref 6–12)
POTASSIUM SERPL-SCNC: 4.1 MMOL/L (ref 3.5–5.2)
RBC # BLD AUTO: 2.5 10*6/MM3 (ref 3.77–5.28)
SODIUM SERPL-SCNC: 132 MMOL/L (ref 136–145)
WBC NRBC COR # BLD: 8.57 10*3/MM3 (ref 3.4–10.8)

## 2022-08-03 PROCEDURE — 25010000002 HEPARIN (PORCINE) PER 1000 UNITS: Performed by: STUDENT IN AN ORGANIZED HEALTH CARE EDUCATION/TRAINING PROGRAM

## 2022-08-03 PROCEDURE — 80048 BASIC METABOLIC PNL TOTAL CA: CPT | Performed by: STUDENT IN AN ORGANIZED HEALTH CARE EDUCATION/TRAINING PROGRAM

## 2022-08-03 PROCEDURE — 99308 SBSQ NF CARE LOW MDM 20: CPT | Performed by: INTERNAL MEDICINE

## 2022-08-03 PROCEDURE — 82962 GLUCOSE BLOOD TEST: CPT

## 2022-08-03 PROCEDURE — 25010000002 CEFTRIAXONE PER 250 MG: Performed by: STUDENT IN AN ORGANIZED HEALTH CARE EDUCATION/TRAINING PROGRAM

## 2022-08-03 PROCEDURE — 85025 COMPLETE CBC W/AUTO DIFF WBC: CPT | Performed by: STUDENT IN AN ORGANIZED HEALTH CARE EDUCATION/TRAINING PROGRAM

## 2022-08-03 RX ADMIN — DONEPEZIL HYDROCHLORIDE 10 MG: 5 TABLET, FILM COATED ORAL at 20:50

## 2022-08-03 RX ADMIN — Medication 10 ML: at 09:00

## 2022-08-03 RX ADMIN — Medication 5000 UNITS: at 16:31

## 2022-08-03 RX ADMIN — METRONIDAZOLE 500 MG: 500 INJECTION, SOLUTION INTRAVENOUS at 03:29

## 2022-08-03 RX ADMIN — ROPINIROLE HYDROCHLORIDE 0.5 MG: 0.25 TABLET, FILM COATED ORAL at 20:50

## 2022-08-03 RX ADMIN — HEPARIN SODIUM 5000 UNITS: 5000 INJECTION INTRAVENOUS; SUBCUTANEOUS at 20:50

## 2022-08-03 RX ADMIN — ISOSORBIDE MONONITRATE 30 MG: 30 TABLET, EXTENDED RELEASE ORAL at 16:32

## 2022-08-03 RX ADMIN — Medication 20 ML: at 16:33

## 2022-08-03 RX ADMIN — Medication 20 ML: at 20:50

## 2022-08-03 RX ADMIN — Medication 10 ML: at 20:51

## 2022-08-03 RX ADMIN — METRONIDAZOLE 500 MG: 500 INJECTION, SOLUTION INTRAVENOUS at 12:01

## 2022-08-03 RX ADMIN — LOSARTAN POTASSIUM 50 MG: 50 TABLET, FILM COATED ORAL at 16:32

## 2022-08-03 RX ADMIN — METRONIDAZOLE 500 MG: 500 INJECTION, SOLUTION INTRAVENOUS at 20:10

## 2022-08-03 RX ADMIN — HYDROCODONE BITARTRATE AND ACETAMINOPHEN 10 ML: 7.5; 325 SOLUTION ORAL at 04:30

## 2022-08-03 RX ADMIN — CEFTRIAXONE 2 G: 2 INJECTION, POWDER, FOR SOLUTION INTRAMUSCULAR; INTRAVENOUS at 12:10

## 2022-08-03 RX ADMIN — LEVOTHYROXINE SODIUM 25 MCG: 25 TABLET ORAL at 04:29

## 2022-08-03 RX ADMIN — ATORVASTATIN CALCIUM 40 MG: 40 TABLET, FILM COATED ORAL at 20:50

## 2022-08-03 NOTE — PROGRESS NOTES
PROGRESS NOTE         Patient Identification:  Name:  Yecenia Botello  Age:  71 y.o.  Sex:  female  :  1950  MRN:  [id number]  Visit Number:  [id number]  Primary Care Provider:  Johnathan Ashraf MD         LOS: 4 days       ----------------------------------------------------------------------------------------------------------------------  Subjective       Chief Complaints:    No chief complaint on file.        Interval History:      The patient is sitting up in bed on room air in no apparent distress.  Awake and alert, but nonverbal at baseline.  Discussed case with primary RN, Elly, who reports that patient is scheduled for debridement of sacral wound later today.  Clear to auscultation bilaterally.  Abdomen is soft, nontender, normal bowel sounds.  Afebrile, no diarrhea.  Leukocytosis is resolved.  COVID-19 testing on 2022 was negative.  Blood cultures on 2022 finalized as no growth.    Review of Systems:    Unable to obtain.  Patient is nonverbal.  ----------------------------------------------------------------------------------------------------------------------      Objective       Current Hospital Meds:  aspirin, 81 mg, Per PEG Tube, Daily  atorvastatin, 40 mg, Per PEG Tube, Nightly  cefTRIAXone, 2 g, Intravenous, Q24H  Dakins (full strength), 20 mL, Topical, Q12H  donepezil, 10 mg, Per PEG Tube, Nightly  heparin (porcine), 5,000 Units, Subcutaneous, Q12H  Insulin Aspart, 0-9 Units, Subcutaneous, TID AC  insulin detemir, 20 Units, Subcutaneous, Nightly  isosorbide mononitrate, 30 mg, Oral, Q24H  levothyroxine, 25 mcg, Per PEG Tube, QAM  losartan, 50 mg, Per PEG Tube, Q24H  metroNIDAZOLE, 500 mg, Intravenous, Q8H  rOPINIRole, 0.5 mg, Per PEG Tube, Nightly  sodium chloride, 10 mL, Intravenous, Q12H  [START ON 2022] tuberculin, 5 Units, Intradermal, Once  vitamin D3, 5,000 Units, Per PEG Tube, Daily          ----------------------------------------------------------------------------------------------------------------------    Vital Signs:  Temp:  [98.6 °F (37 °C)-99.1 °F (37.3 °C)] 98.6 °F (37 °C)  Heart Rate:  [78-90] 78  Resp:  [16-20] 16  BP: (111-138)/(50-76) 138/76  No data found.  SpO2 Percentage    08/01/22 0600 08/01/22 1900 08/02/22 1900   SpO2: 98% 97% 93%     SpO2:  [93 %] 93 %  on   ;   Device (Oxygen Therapy): room air    Body mass index is 23.25 kg/m².  Wt Readings from Last 3 Encounters:   08/03/22 61.5 kg (135 lb 8 oz)   07/30/22 61.5 kg (135 lb 8 oz)   02/26/15 95.7 kg (211 lb)        Intake/Output Summary (Last 24 hours) at 8/3/2022 1050  Last data filed at 8/3/2022 0436  Gross per 24 hour   Intake 1839.34 ml   Output 750 ml   Net 1089.34 ml     NPO Diet NPO Type: Strict NPO  ----------------------------------------------------------------------------------------------------------------------      Physical Exam:       Constitutional: Elderly, chronically ill-appearing female resting comfortably in bed in no apparent distress.  Awake and alert and appears very comfortable.  HENT:  Head: Normocephalic and atraumatic.  Mouth:  Moist mucous membranes.    Eyes:  Conjunctivae and EOM are normal.  No scleral icterus.  Neck:  Neck supple.  No JVD present.    Cardiovascular:  Normal rate, regular rhythm. No edema. 3/6 systolic murmur.  Pulmonary/Chest:  No respiratory distress, no wheezes, no crackles, with normal breath sounds and good air movement.  Abdominal: Abdomen is soft and nondistended with normal bowel sounds. Seems to be nontender, as there is no grimacing or guarding.  Musculoskeletal:  No edema, no tenderness, and no deformity.  No swelling or redness of joints.  Neurological: Awake and alert.  Nonverbal.  No facial droop.   Skin:  Skin is warm and dry.  No rash noted.  No pallor.  Stage IV decubitus ulcer to sacrum, dressing in place.  Psychiatric:  Calm and cooperative.  Behavior is  normal.    ----------------------------------------------------------------------------------------------------------------------            Results from last 7 days   Lab Units 08/03/22 0204 08/02/22 0130 08/01/22 0154 07/29/22  0011 07/28/22  1033   LACTATE mmol/L  --   --   --   --  2.3*   WBC 10*3/mm3 8.57 13.20* 16.54*   < >  --    HEMOGLOBIN g/dL 7.8* 9.1* 7.8*   < >  --    HEMATOCRIT % 24.4* 30.6* 24.9*   < >  --    MCV fL 97.6* 101.3* 96.9   < >  --    MCHC g/dL 32.0 29.7* 31.3*   < >  --    PLATELETS 10*3/mm3 328 379 471*   < >  --     < > = values in this interval not displayed.     Results from last 7 days   Lab Units 08/03/22 0204 08/02/22 0130 08/01/22  0154   SODIUM mmol/L 132* 132* 136   POTASSIUM mmol/L 4.1 5.2 4.2   CHLORIDE mmol/L 99 101 102   CO2 mmol/L 25.7 21.1* 27.5   BUN mg/dL 24* 27* 27*   CREATININE mg/dL 0.46* 0.52* 0.47*   CALCIUM mg/dL 7.8* 8.4* 8.3*   GLUCOSE mg/dL 240* 219* 183*   Estimated Creatinine Clearance: 110.5 mL/min (A) (by C-G formula based on SCr of 0.46 mg/dL (L)).  No results found for: AMMONIA    Glucose   Date/Time Value Ref Range Status   08/03/2022 0645 203 (H) 70 - 130 mg/dL Final     Comment:     Meter: [id number] : [id number] JENNY MIGUEL   08/02/2022 2253 255 (H) 70 - 130 mg/dL Final     Comment:     Meter: [id number] : [id number] chika beltrán   08/02/2022 1659 132 (H) 70 - 130 mg/dL Final     Comment:     Meter: [id number] : [id number] ELAINE HATMAKER   08/02/2022 1049 158 (H) 70 - 130 mg/dL Final     Comment:     Meter: [id number] : [id number] ELAINE HATMAKER   08/02/2022 0646 199 (H) 70 - 130 mg/dL Final     Comment:     Meter: [id number] : [id number] JENNY MIGUEL   08/01/2022 2145 195 (H) 70 - 130 mg/dL Final     Comment:     Meter: [id number] : [id number] chika beltrán   08/01/2022 1619 311 (H) 70 - 130 mg/dL Final     Comment:     Meter: [id number] : [id number] ELAINE HATMAKER   08/01/2022 1058 234 (H) 70 - 130 mg/dL  Final     Comment:     Meter: [id number] : [id number] ELAINE LABOY     Lab Results   Component Value Date    HGBA1C 7.20 (H) 07/22/2022     Lab Results   Component Value Date    TSH 3.130 02/26/2015    FREET4 0.78 (L) 02/26/2015       Blood Culture   Date Value Ref Range Status   07/23/2022 No growth at 24 hours  Preliminary   07/23/2022 No growth at 24 hours  Preliminary     No results found for: URINECX  Wound Culture   Date Value Ref Range Status   07/23/2022 Rare Gram Negative Bacilli (A)  Preliminary     No results found for: STOOLCX  No results found for: RESPCX  Pain Management Panel    There is no flowsheet data to display.           ----------------------------------------------------------------------------------------------------------------------  Imaging Results (Last 24 Hours)     ** No results found for the last 24 hours. **          Pertinent Infectious Disease Results    Urinalysis from 7/23/2022 positive with culture finalized as greater than 100,000 colonies of E. coli and greater than 100,000 colonies of Klebsiella pneumoniae. Wound culture of the sacrum from 7/23/2022 finalized as pan susceptible E. coli.   Blood cultures on 7/23/2022 finalized as no growth.  COVID-19 influenza PCR negative.  CT of the lumbar spine, abdomen and pelvis reports sacral decubitus ulcer with fluid and gas collection noted in the right lower buttock and soft tissue gas and soft tissue changes noted to the left of the midline overlying the sacrum as well, these abnormalities are in contact with the distal coccyx and ostium myelitis is not excluded, large colonic stool burden, degenerative disc disease lumbar spine. MRI of the pelvis from 7/25/2022 reports large right paramedian sacral decubitus ulceration with associated soft tissue edema noted but no well-defined fluid collection, no MRI signal characteristics of the bony structures of the pelvis indicate changes of osteomyelitis, diffuse soft tissue edema  likely cellulitis, trace ascites in the lower pelvis. 2D echo on 7/29/2022 made no mention of vegetations.  Anaerobic culture of sacrum wound on 7/23/2022 finalized as Bacteroides fragilis.    ----------------------------------------------------------------------------------------------------------------------    Assessment/Plan     1.  Infected sacral decubitus ulcer    With culture showing growth of Bacteroides fragilis, I added coverage with Flagyl 500 mg IV every 8 hours x10 days and can transition coverage to oral option upon discharge and to continue with the Rocephin/oral Omnicef through August 4, 2022.  Patient seems to be doing very well on current regimen with no evidence of clinical sepsis.  Leukocytosis has resolved.    2.  Severe sepsis with lactic acid greater than 2 on admission.    At this time patient seems to be stable with no evidence suggestive of clinical sepsis or hemodynamic instability we will continue to follow closely.  No fever reported overnight and leukocytosis resolved.    3.  New onset diarrhea.    Diarrhea seems to have improved since the addition of Flagyl and will continue to follow closely as patient is at high risk for C. difficile colitis and would check C. difficile toxin PCR if persistent diarrhea.            Code Status:   Code Status and Medical Interventions:   Ordered at: 07/30/22 0946     Medical Intervention Limits:    NO intubation (DNI)     Level Of Support Discussed With:    Next of Kin (If No Surrogate)     Code Status (Patient has no pulse and is not breathing):    No CPR (Do Not Attempt to Resuscitate)     Medical Interventions (Patient has pulse or is breathing):    Limited Support     Comments:    pts daughter Yecenia       Kerry Sierra PA-C  08/03/22  10:50 EDT     Electronically signed by Kerry Sierra PA-C, 08/03/22, 10:58 AM EDT.  Electronically signed by Tye Long MD, 08/03/22, 1:06 PM EDT.

## 2022-08-03 NOTE — PROGRESS NOTES
Surgery follow up    Nurse did not make patient NPO post MN as ordered so patient forced to wait until after 3:30.  Unfortunately OR is now backed up until after 5 pm.     Patient placed on schedule for tomorrow.    Spoke directly with nurse Su about NPO status.

## 2022-08-03 NOTE — PLAN OF CARE
Goal Outcome Evaluation:              Outcome Evaluation: Patient rested quietly today, npo for surgery, will monitor.

## 2022-08-03 NOTE — NURSING NOTE
While performing my shift assessment, patient was found to have a skin tear on her right knuckle.  Appropriate orders placed.  Notified Angelina BOTELLO.

## 2022-08-03 NOTE — PROGRESS NOTES
Patient vital signs stable tolerating feeding, patient is scheduled for OR today for debridement and partial closure per surgery.  Chart reviewed, discussed with  with , family would prefer to bring her home which I think is not optimal considering the size and severity of her decubiti,  will discuss with family.  Continue wound care, surgery help appreciated.  Infectious disease follow-up noted and appreciated.

## 2022-08-03 NOTE — CASE MANAGEMENT/SOCIAL WORK
Discharge Planning Assessment  [facility]     Patient Name: Yecenia Botello  MRN: [id number]  Today's Date: 8/3/2022    Admit Date: 7/30/2022     Discharge Plan     Row Name 08/03/22 1709       Plan    Plan Pt was admitted to swing bed on 7/30/22. SS received Physician consult for swing bed. SS spoke with Pt's daughter, Yecenia on this date. Pt lives with her daughter, Yecenia and family plans for her to return home at discharge. Pt does not currently utilize home health services. [facility] utilized [facility] past. Pt has a peg tube, tube feeding supplies, wheelchair, reinaldo lift, and hospital bed via [facility]. Pt will need EMS to transport arranged at discharge. SS to follow.                   Selected Continued Care - Prior Encounters Includes selections from prior encounters from 5/1/2022 to 8/3/2022    Discharged on 7/30/2022 Admission date: 7/22/2022 - Discharge disposition: Skilled Nursing Facility ([facility])    Destination     Service Provider Selected Services Address Phone Fax Patient Preferred    [facility] [address] [place] [address] [phone] -- --                    CHANTELLE Shukla

## 2022-08-04 ENCOUNTER — HOSPITAL ENCOUNTER (OUTPATIENT)
Facility: HOSPITAL | Age: 72
Setting detail: HOSPITAL OUTPATIENT SURGERY
End: 2022-08-04
Attending: SURGERY | Admitting: SURGERY

## 2022-08-04 LAB
ANION GAP SERPL CALCULATED.3IONS-SCNC: 7.9 MMOL/L (ref 5–15)
BASOPHILS # BLD AUTO: 0.04 10*3/MM3 (ref 0–0.2)
BASOPHILS NFR BLD AUTO: 0.5 % (ref 0–1.5)
BUN SERPL-MCNC: 23 MG/DL (ref 8–23)
BUN/CREAT SERPL: 53.5 (ref 7–25)
CALCIUM SPEC-SCNC: 8 MG/DL (ref 8.6–10.5)
CHLORIDE SERPL-SCNC: 101 MMOL/L (ref 98–107)
CO2 SERPL-SCNC: 25.1 MMOL/L (ref 22–29)
CREAT SERPL-MCNC: 0.43 MG/DL (ref 0.57–1)
DEPRECATED RDW RBC AUTO: 57.4 FL (ref 37–54)
EGFRCR SERPLBLD CKD-EPI 2021: 104.8 ML/MIN/1.73
EOSINOPHIL # BLD AUTO: 0.16 10*3/MM3 (ref 0–0.4)
EOSINOPHIL NFR BLD AUTO: 2 % (ref 0.3–6.2)
ERYTHROCYTE [DISTWIDTH] IN BLOOD BY AUTOMATED COUNT: 16.8 % (ref 12.3–15.4)
GLUCOSE BLDC GLUCOMTR-MCNC: 137 MG/DL (ref 70–130)
GLUCOSE BLDC GLUCOMTR-MCNC: 138 MG/DL (ref 70–130)
GLUCOSE BLDC GLUCOMTR-MCNC: 139 MG/DL (ref 70–130)
GLUCOSE BLDC GLUCOMTR-MCNC: 152 MG/DL (ref 70–130)
GLUCOSE BLDC GLUCOMTR-MCNC: 168 MG/DL (ref 70–130)
GLUCOSE SERPL-MCNC: 138 MG/DL (ref 65–99)
HCT VFR BLD AUTO: 24.6 % (ref 34–46.6)
HGB BLD-MCNC: 7.7 G/DL (ref 12–15.9)
IMM GRANULOCYTES # BLD AUTO: 0.03 10*3/MM3 (ref 0–0.05)
IMM GRANULOCYTES NFR BLD AUTO: 0.4 % (ref 0–0.5)
LYMPHOCYTES # BLD AUTO: 2.31 10*3/MM3 (ref 0.7–3.1)
LYMPHOCYTES NFR BLD AUTO: 28.8 % (ref 19.6–45.3)
MCH RBC QN AUTO: 30.6 PG (ref 26.6–33)
MCHC RBC AUTO-ENTMCNC: 31.3 G/DL (ref 31.5–35.7)
MCV RBC AUTO: 97.6 FL (ref 79–97)
MONOCYTES # BLD AUTO: 0.49 10*3/MM3 (ref 0.1–0.9)
MONOCYTES NFR BLD AUTO: 6.1 % (ref 5–12)
NEUTROPHILS NFR BLD AUTO: 4.98 10*3/MM3 (ref 1.7–7)
NEUTROPHILS NFR BLD AUTO: 62.2 % (ref 42.7–76)
NRBC BLD AUTO-RTO: 0 /100 WBC (ref 0–0.2)
PLATELET # BLD AUTO: 312 10*3/MM3 (ref 140–450)
PMV BLD AUTO: 10.6 FL (ref 6–12)
POTASSIUM SERPL-SCNC: 4.2 MMOL/L (ref 3.5–5.2)
RBC # BLD AUTO: 2.52 10*6/MM3 (ref 3.77–5.28)
SODIUM SERPL-SCNC: 134 MMOL/L (ref 136–145)
WBC NRBC COR # BLD: 8.01 10*3/MM3 (ref 3.4–10.8)

## 2022-08-04 PROCEDURE — 0HB6XZZ EXCISION OF BACK SKIN, EXTERNAL APPROACH: ICD-10-PCS | Performed by: SURGERY

## 2022-08-04 PROCEDURE — 63710000001 INSULIN DETEMIR PER 5 UNITS: Performed by: SURGERY

## 2022-08-04 PROCEDURE — 25010000002 FENTANYL CITRATE (PF) 50 MCG/ML SOLUTION: Performed by: NURSE ANESTHETIST, CERTIFIED REGISTERED

## 2022-08-04 PROCEDURE — 82962 GLUCOSE BLOOD TEST: CPT

## 2022-08-04 PROCEDURE — 25010000002 PROPOFOL 10 MG/ML EMULSION: Performed by: NURSE ANESTHETIST, CERTIFIED REGISTERED

## 2022-08-04 PROCEDURE — 25010000002 ONDANSETRON PER 1 MG: Performed by: NURSE ANESTHETIST, CERTIFIED REGISTERED

## 2022-08-04 PROCEDURE — 80048 BASIC METABOLIC PNL TOTAL CA: CPT | Performed by: STUDENT IN AN ORGANIZED HEALTH CARE EDUCATION/TRAINING PROGRAM

## 2022-08-04 PROCEDURE — 25010000002 MIDAZOLAM PER 1 MG: Performed by: NURSE ANESTHETIST, CERTIFIED REGISTERED

## 2022-08-04 PROCEDURE — 25010000002 HEPARIN (PORCINE) PER 1000 UNITS: Performed by: SURGERY

## 2022-08-04 PROCEDURE — 85025 COMPLETE CBC W/AUTO DIFF WBC: CPT | Performed by: STUDENT IN AN ORGANIZED HEALTH CARE EDUCATION/TRAINING PROGRAM

## 2022-08-04 PROCEDURE — 99309 SBSQ NF CARE MODERATE MDM 30: CPT | Performed by: INTERNAL MEDICINE

## 2022-08-04 PROCEDURE — 13160 SEC CLSR SURG WND/DEHSN XTN: CPT | Performed by: SURGERY

## 2022-08-04 RX ORDER — SODIUM CHLORIDE 0.9 % (FLUSH) 0.9 %
10 SYRINGE (ML) INJECTION AS NEEDED
Status: DISCONTINUED | OUTPATIENT
Start: 2022-08-04 | End: 2022-08-04 | Stop reason: HOSPADM

## 2022-08-04 RX ORDER — FENTANYL CITRATE 50 UG/ML
INJECTION, SOLUTION INTRAMUSCULAR; INTRAVENOUS AS NEEDED
Status: DISCONTINUED | OUTPATIENT
Start: 2022-08-04 | End: 2022-08-04 | Stop reason: SURG

## 2022-08-04 RX ORDER — ONDANSETRON 2 MG/ML
INJECTION INTRAMUSCULAR; INTRAVENOUS AS NEEDED
Status: DISCONTINUED | OUTPATIENT
Start: 2022-08-04 | End: 2022-08-04 | Stop reason: SURG

## 2022-08-04 RX ORDER — SODIUM CHLORIDE, SODIUM LACTATE, POTASSIUM CHLORIDE, CALCIUM CHLORIDE 600; 310; 30; 20 MG/100ML; MG/100ML; MG/100ML; MG/100ML
INJECTION, SOLUTION INTRAVENOUS CONTINUOUS PRN
Status: DISCONTINUED | OUTPATIENT
Start: 2022-08-04 | End: 2022-08-04 | Stop reason: SURG

## 2022-08-04 RX ORDER — SODIUM CHLORIDE, SODIUM LACTATE, POTASSIUM CHLORIDE, CALCIUM CHLORIDE 600; 310; 30; 20 MG/100ML; MG/100ML; MG/100ML; MG/100ML
100 INJECTION, SOLUTION INTRAVENOUS ONCE AS NEEDED
Status: DISCONTINUED | OUTPATIENT
Start: 2022-08-04 | End: 2022-08-04 | Stop reason: HOSPADM

## 2022-08-04 RX ORDER — FENTANYL CITRATE 50 UG/ML
50 INJECTION, SOLUTION INTRAMUSCULAR; INTRAVENOUS
Status: DISCONTINUED | OUTPATIENT
Start: 2022-08-04 | End: 2022-08-04 | Stop reason: HOSPADM

## 2022-08-04 RX ORDER — ONDANSETRON 2 MG/ML
4 INJECTION INTRAMUSCULAR; INTRAVENOUS AS NEEDED
Status: DISCONTINUED | OUTPATIENT
Start: 2022-08-04 | End: 2022-08-04 | Stop reason: HOSPADM

## 2022-08-04 RX ORDER — IPRATROPIUM BROMIDE AND ALBUTEROL SULFATE 2.5; .5 MG/3ML; MG/3ML
3 SOLUTION RESPIRATORY (INHALATION) ONCE AS NEEDED
Status: DISCONTINUED | OUTPATIENT
Start: 2022-08-04 | End: 2022-08-04 | Stop reason: HOSPADM

## 2022-08-04 RX ORDER — PROPOFOL 10 MG/ML
VIAL (ML) INTRAVENOUS AS NEEDED
Status: DISCONTINUED | OUTPATIENT
Start: 2022-08-04 | End: 2022-08-04 | Stop reason: SURG

## 2022-08-04 RX ORDER — FAMOTIDINE 10 MG/ML
INJECTION, SOLUTION INTRAVENOUS AS NEEDED
Status: DISCONTINUED | OUTPATIENT
Start: 2022-08-04 | End: 2022-08-04 | Stop reason: SURG

## 2022-08-04 RX ORDER — MAGNESIUM HYDROXIDE 1200 MG/15ML
LIQUID ORAL AS NEEDED
Status: DISCONTINUED | OUTPATIENT
Start: 2022-08-04 | End: 2022-08-04 | Stop reason: HOSPADM

## 2022-08-04 RX ORDER — OXYCODONE HYDROCHLORIDE AND ACETAMINOPHEN 5; 325 MG/1; MG/1
1 TABLET ORAL ONCE AS NEEDED
Status: DISCONTINUED | OUTPATIENT
Start: 2022-08-04 | End: 2022-08-04 | Stop reason: HOSPADM

## 2022-08-04 RX ORDER — SODIUM CHLORIDE 0.9 % (FLUSH) 0.9 %
10 SYRINGE (ML) INJECTION EVERY 12 HOURS SCHEDULED
Status: DISCONTINUED | OUTPATIENT
Start: 2022-08-04 | End: 2022-08-04 | Stop reason: HOSPADM

## 2022-08-04 RX ORDER — MIDAZOLAM HYDROCHLORIDE 1 MG/ML
INJECTION INTRAMUSCULAR; INTRAVENOUS AS NEEDED
Status: DISCONTINUED | OUTPATIENT
Start: 2022-08-04 | End: 2022-08-04 | Stop reason: SURG

## 2022-08-04 RX ORDER — SODIUM CHLORIDE, SODIUM LACTATE, POTASSIUM CHLORIDE, CALCIUM CHLORIDE 600; 310; 30; 20 MG/100ML; MG/100ML; MG/100ML; MG/100ML
125 INJECTION, SOLUTION INTRAVENOUS ONCE
Status: DISCONTINUED | OUTPATIENT
Start: 2022-08-04 | End: 2022-08-04 | Stop reason: HOSPADM

## 2022-08-04 RX ORDER — MIDAZOLAM HYDROCHLORIDE 1 MG/ML
0.5 INJECTION INTRAMUSCULAR; INTRAVENOUS
Status: DISCONTINUED | OUTPATIENT
Start: 2022-08-04 | End: 2022-08-04 | Stop reason: HOSPADM

## 2022-08-04 RX ADMIN — METRONIDAZOLE 500 MG: 500 INJECTION, SOLUTION INTRAVENOUS at 03:14

## 2022-08-04 RX ADMIN — PROPOFOL 30 MG: 10 INJECTION, EMULSION INTRAVENOUS at 13:50

## 2022-08-04 RX ADMIN — ISOSORBIDE MONONITRATE 30 MG: 30 TABLET, EXTENDED RELEASE ORAL at 17:27

## 2022-08-04 RX ADMIN — FAMOTIDINE 20 MG: 10 INJECTION INTRAVENOUS at 13:37

## 2022-08-04 RX ADMIN — FENTANYL CITRATE 100 MCG: 50 INJECTION INTRAMUSCULAR; INTRAVENOUS at 13:42

## 2022-08-04 RX ADMIN — PROPOFOL 30 MG: 10 INJECTION, EMULSION INTRAVENOUS at 13:56

## 2022-08-04 RX ADMIN — ATORVASTATIN CALCIUM 40 MG: 40 TABLET, FILM COATED ORAL at 20:03

## 2022-08-04 RX ADMIN — DONEPEZIL HYDROCHLORIDE 10 MG: 5 TABLET, FILM COATED ORAL at 20:03

## 2022-08-04 RX ADMIN — Medication 5000 UNITS: at 17:26

## 2022-08-04 RX ADMIN — METRONIDAZOLE 500 MG: 500 INJECTION, SOLUTION INTRAVENOUS at 20:03

## 2022-08-04 RX ADMIN — ONDANSETRON 4 MG: 2 INJECTION INTRAMUSCULAR; INTRAVENOUS at 13:37

## 2022-08-04 RX ADMIN — ROPINIROLE HYDROCHLORIDE 0.5 MG: 0.25 TABLET, FILM COATED ORAL at 20:03

## 2022-08-04 RX ADMIN — HEPARIN SODIUM 5000 UNITS: 5000 INJECTION INTRAVENOUS; SUBCUTANEOUS at 20:03

## 2022-08-04 RX ADMIN — LOSARTAN POTASSIUM 50 MG: 50 TABLET, FILM COATED ORAL at 17:27

## 2022-08-04 RX ADMIN — PROPOFOL 30 MG: 10 INJECTION, EMULSION INTRAVENOUS at 14:02

## 2022-08-04 RX ADMIN — PROPOFOL 30 MG: 10 INJECTION, EMULSION INTRAVENOUS at 14:08

## 2022-08-04 RX ADMIN — INSULIN DETEMIR 20 UNITS: 100 INJECTION, SOLUTION SUBCUTANEOUS at 20:02

## 2022-08-04 RX ADMIN — METRONIDAZOLE 500 MG: 500 INJECTION, SOLUTION INTRAVENOUS at 12:22

## 2022-08-04 RX ADMIN — MIDAZOLAM HYDROCHLORIDE 2 MG: 1 INJECTION, SOLUTION INTRAMUSCULAR; INTRAVENOUS at 13:42

## 2022-08-04 RX ADMIN — SODIUM CHLORIDE, POTASSIUM CHLORIDE, SODIUM LACTATE AND CALCIUM CHLORIDE: 600; 310; 30; 20 INJECTION, SOLUTION INTRAVENOUS at 13:37

## 2022-08-04 NOTE — PROGRESS NOTES
"Palliative Care Daily Progress Note     S: Medical record reviewed, followed up with Primary RN Elly and DR Dodson regarding patient's condition. When Palliative entered the room the pt was awake, resting quietly. She is non verbal, however did not seem to be in distress or appear uncomfortable.      O:   Palliative Performance Scale Score:     /67 (BP Location: Left arm, Patient Position: Lying)   Pulse 61   Temp 98.4 °F (36.9 °C) (Tympanic)   Resp 16   Ht 162.6 cm (64.02\")   Wt 60.9 kg (134 lb 3.2 oz)   SpO2 97%   BMI 23.02 kg/m²     Intake/Output Summary (Last 24 hours) at 8/4/2022 1449  Last data filed at 8/4/2022 1429  Gross per 24 hour   Intake 700 ml   Output 350 ml   Net 350 ml       PE:  Pt was awake, not oriented, non verbal, she is thin frail and weak in appearance, head is normocephalic and atraumatic. Regular heart rate and rhythm with regular unlabored respirations, weak movement of extremities, Abdomen soft and non tender,,Hypo BS x4, she is calm.      Meds: Reviewed and changes noted.    Labs:   Results from last 7 days   Lab Units 08/04/22  0118   WBC 10*3/mm3 8.01   HEMOGLOBIN g/dL 7.7*   HEMATOCRIT % 24.6*   PLATELETS 10*3/mm3 312     Results from last 7 days   Lab Units 08/04/22  0118   SODIUM mmol/L 134*   POTASSIUM mmol/L 4.2   CHLORIDE mmol/L 101   CO2 mmol/L 25.1   BUN mg/dL 23   CREATININE mg/dL 0.43*   GLUCOSE mg/dL 138*   CALCIUM mg/dL 8.0*     Results from last 7 days   Lab Units 08/04/22  0118   SODIUM mmol/L 134*   POTASSIUM mmol/L 4.2   CHLORIDE mmol/L 101   CO2 mmol/L 25.1   BUN mg/dL 23   CREATININE mg/dL 0.43*   CALCIUM mg/dL 8.0*   GLUCOSE mg/dL 138*     Imaging Results (Last 72 Hours)     ** No results found for the last 72 hours. **            Diagnostics: Reviewed    A: Yecenia Botello is a 71 y.o.  female  admitted on 7/22/2022 with worsening decubitus ulcer, she has a medical history of stroke with significant residual deficits, now non-ambulatory and minimally " verbal, who presents with complaints of worsening sacral decubitus ulcer per family. Per report pt had been admitted to [facility] in April for MARLON and failure to thrive which is where the ulcer developed. Since that time her family had been treating it at home with medi honey.  Today 8/4/2022  T 98.4, HR 61, RR 16 and BP of 115/67 and was saturating 97% on 2 L NC    P:  I attempted to touch base with pts daughter Yecenia without success. Pt had wound debridement this afternoon and plan is to return home at time of discharge.    We will continue to follow along. Please do not hesitate to contact us regarding further sx mgmt or GOC needs, including after hours or on weekends via our on call provider at [phone].     Regine Saini, APRN    8/4/2022

## 2022-08-04 NOTE — ANESTHESIA POSTPROCEDURE EVALUATION
Patient: Yecenia Botello    Procedure Summary     Date: 08/04/22 Room / Location: [facility] OR 01 /  [facility] OR    Anesthesia Start: 1337 Anesthesia Stop: 1429    Procedures:       DEBRIDEMENT OF ISCHIAL ULCER/BUTTOCKS WOUND (N/A Buttocks)      DEBRIDEMENT OF ISCHIAL ULCER/BUTTOCKS WOUND (N/A Buttocks) Diagnosis:       Pressure injury, stage 4, with infection (HCC)      (Pressure injury, stage 4, with infection (HCC) [L89.94, L08.9])      (Pressure injury, stage 4, with infection (HCC) [L89.94, L08.9])    Surgeons: Daja Ivory MD Provider: Jesu Mariee MD    Anesthesia Type: general ASA Status: 4          Anesthesia Type: general    Vitals  Vitals Value Taken Time   /67 08/04/22 1444   Temp 98.4 °F (36.9 °C) 08/04/22 1444   Pulse 61 08/04/22 1444   Resp 16 08/04/22 1444   SpO2 97 % 08/04/22 1444           Anesthesia Post Evaluation

## 2022-08-04 NOTE — PLAN OF CARE
Goal Outcome Evaluation:           Progress: no change  Outcome Evaluation: Patient has rested tonight. Tube feeding was held at midnight for surgery today. Patient has been NPO since midnight. Will continue to monitor.

## 2022-08-04 NOTE — OP NOTE
Debridement sacral wound with partial secondary closure    Pre-op: Sacral wound    Post-op:  Same    Surgeon:  Daja Ivory M.D., F.A.C.S.    Assistant:  None    Anesthesia:  general      Indications: Large sacral wound which tunneled from the sacrum towards the greater trochanter.  Patient has been receiving wound care and the wound is granulating well.       Procedure Details   After obtaining informed consent and receiving preoperative antibiotics and with venous compression boots in place, the patient was taken to the operating room and placed under anesthesia.  There was a large opening at the sacrum with a smaller opening made gluteal with a V shaped incision.  From the V-shaped incision to the most lateral extension there was a Penrose drain.  The Penrose drain was removed.  All tissue was inspected and was granulating well.  There was a 4 x 2 cm necrotic area under the sacral wound which was excisionally debrided.  In the central open area #1 Vicryl sutures were used to close the deep dead space and the skin was then closed with interrupted 2-0 nylon sutures.  This was over an 8 cm area.  The most lateral wound opening which was granulating well was packed with 2 inch Nain.  The sacral wound was then secondarily closed along the medial aspect.  4-1/2 cm along the right lateral aspect were closed with deep sutures of #1 Vicryl from Kwabena's fascia to the muscle.  Over this 4-1/2 cm area the skin was closed with 2-0 nylon sutures.  The remainder of the open wound was packed with wet-to-dry Nain and Kerlix dressings were placed.  Patient tolerated the procedure well was taken to the recovery room in stable condition    Findings:      Estimated Blood Loss:  Minimal    Blood Administered: none           Drains: none           Specimens: None     Grafts and Implants: No       Complications:  none           Disposition: PACU - hemodynamically stable.           Condition: stable

## 2022-08-04 NOTE — ANESTHESIA PREPROCEDURE EVALUATION
Anesthesia Evaluation     no history of anesthetic complications:  NPO Solid Status: > 8 hours  NPO Liquid Status: > 8 hours           Airway   Mallampati: II  TM distance: >3 FB  Neck ROM: full  No difficulty expected  Dental - normal exam   (+) poor dentition        Pulmonary - normal exam   Cardiovascular - normal exam        Neuro/Psych  (+) CVA residual symptoms, dementia,    (-) psychiatric history  GI/Hepatic/Renal/Endo      Musculoskeletal     Abdominal  - normal exam    Bowel sounds: normal.   Substance History      OB/GYN          Other        ROS/Med Hx Other: Sepsis   Debility                  Anesthesia Plan    ASA 4     general     intravenous induction     Anesthetic plan, risks, benefits, and alternatives have been provided, discussed and informed consent has been obtained with: patient.        CODE STATUS:

## 2022-08-04 NOTE — PLAN OF CARE
Goal Outcome Evaluation:              Outcome Evaluation: Patient went for debridement of wounds today. Drowsy and sleping afterward, will monitor.

## 2022-08-04 NOTE — PROGRESS NOTES
Nutrition Services    Patient Name:  Yecenia Botello  YOB: 1950  MRN: [id number]  Admit Date:  7/30/2022    BMI: 23.02  Diet: NPO  Tube Feed: Isosource 1.5 with rate of 50 ml/hr continouos.  Intake: meeting needs  Fluids: meeting needs     Electronically signed by:  Elías Sears RD  08/04/22 07:52 EDT

## 2022-08-04 NOTE — PROGRESS NOTES
PROGRESS NOTE         Patient Identification:  Name:  Yecenia Botello  Age:  71 y.o.  Sex:  female  :  1950  MRN:  [id number]  Visit Number:  [id number]  Primary Care Provider:  Johnathan Ashraf MD         LOS: 5 days       ----------------------------------------------------------------------------------------------------------------------  Subjective       Chief Complaints:    No chief complaint on file.        Interval History:      Patient resting in bed this morning.  Afebrile, no diarrhea.  Currently on room air with no apparent distress.  Plans to go to the OR today for repeat wound debridement and partial secondary closure.  WBC normal.    Review of Systems:    Unable to obtain.  Patient is nonverbal.  ----------------------------------------------------------------------------------------------------------------------      Objective       Current Hospital Meds:  [MAR Hold] aspirin, 81 mg, Per PEG Tube, Daily  [MAR Hold] atorvastatin, 40 mg, Per PEG Tube, Nightly  [MAR Hold] Dakins (full strength), 20 mL, Topical, Q12H  [MAR Hold] donepezil, 10 mg, Per PEG Tube, Nightly  [MAR Hold] heparin (porcine), 5,000 Units, Subcutaneous, Q12H  [MAR Hold] Insulin Aspart, 0-9 Units, Subcutaneous, TID AC  [MAR Hold] insulin detemir, 20 Units, Subcutaneous, Nightly  [MAR Hold] isosorbide mononitrate, 30 mg, Oral, Q24H  lactated ringers, 125 mL/hr, Intravenous, Once  [MAR Hold] levothyroxine, 25 mcg, Per PEG Tube, QAM  losartan, 50 mg, Per PEG Tube, Q24H  metroNIDAZOLE, 500 mg, Intravenous, Q8H  [MAR Hold] rOPINIRole, 0.5 mg, Per PEG Tube, Nightly  [MAR Hold] sodium chloride, 10 mL, Intravenous, Q12H  sodium chloride, 10 mL, Intravenous, Q12H  [MAR Hold] tuberculin, 5 Units, Intradermal, Once  [MAR Hold] vitamin D3, 5,000 Units, Per PEG Tube, Daily         ----------------------------------------------------------------------------------------------------------------------    Vital Signs:  Temp:  [98 °F  (36.7 °C)-98.9 °F (37.2 °C)] 98 °F (36.7 °C)  Heart Rate:  [70-79] 70  Resp:  [16-20] 20  BP: (102-158)/(54-76) 130/73  No data found.  SpO2 Percentage    08/03/22 1900 08/04/22 0600 08/04/22 1223   SpO2: 94% 98% 95%     SpO2:  [94 %-98 %] 95 %  on   ;   Device (Oxygen Therapy): room air    Body mass index is 23.02 kg/m².  Wt Readings from Last 3 Encounters:   08/04/22 60.9 kg (134 lb 3.2 oz)   07/30/22 61.5 kg (135 lb 8 oz)   02/26/15 95.7 kg (211 lb)        Intake/Output Summary (Last 24 hours) at 8/4/2022 1244  Last data filed at 8/4/2022 0314  Gross per 24 hour   Intake 300 ml   Output 350 ml   Net -50 ml     NPO Diet NPO Type: Strict NPO  ----------------------------------------------------------------------------------------------------------------------      Physical Exam:       Constitutional: Elderly, chronically ill-appearing female resting comfortably in bed in no apparent distress.  Awake and alert and appears very comfortable.  HENT:  Head: Normocephalic and atraumatic.  Mouth:  Moist mucous membranes.    Eyes:  Conjunctivae and EOM are normal.  No scleral icterus.  Neck:  Neck supple.  No JVD present.    Cardiovascular:  Normal rate, regular rhythm. No edema. 3/6 systolic murmur.  Pulmonary/Chest:  No respiratory distress, no wheezes, no crackles, with normal breath sounds and good air movement.  Abdominal: Abdomen is soft and nondistended with normal bowel sounds. Seems to be nontender, as there is no grimacing or guarding.  Musculoskeletal:  No edema, no tenderness, and no deformity.  No swelling or redness of joints.  Neurological: Awake and alert.  Nonverbal.  No facial droop.   Skin:  Skin is warm and dry.  No rash noted.  No pallor.  Stage IV decubitus ulcer to sacrum, dressing in place.  Psychiatric:  Calm and cooperative.  Behavior is normal.    ----------------------------------------------------------------------------------------------------------------------            Results from last 7  days   Lab Units 08/04/22  0118 08/03/22  0204 08/02/22  0130   WBC 10*3/mm3 8.01 8.57 13.20*   HEMOGLOBIN g/dL 7.7* 7.8* 9.1*   HEMATOCRIT % 24.6* 24.4* 30.6*   MCV fL 97.6* 97.6* 101.3*   MCHC g/dL 31.3* 32.0 29.7*   PLATELETS 10*3/mm3 312 328 379     Results from last 7 days   Lab Units 08/04/22  0118 08/03/22  0204 08/02/22  0130   SODIUM mmol/L 134* 132* 132*   POTASSIUM mmol/L 4.2 4.1 5.2   CHLORIDE mmol/L 101 99 101   CO2 mmol/L 25.1 25.7 21.1*   BUN mg/dL 23 24* 27*   CREATININE mg/dL 0.43* 0.46* 0.52*   CALCIUM mg/dL 8.0* 7.8* 8.4*   GLUCOSE mg/dL 138* 240* 219*   Estimated Creatinine Clearance: 117 mL/min (A) (by C-G formula based on SCr of 0.43 mg/dL (L)).  No results found for: AMMONIA    Glucose   Date/Time Value Ref Range Status   08/04/2022 1157 152 (H) 70 - 130 mg/dL Final     Comment:     Meter: [id number] : [id number] Orion JENNINGS   08/04/2022 0607 138 (H) 70 - 130 mg/dL Final     Comment:     Meter: [id number] : [id number] DULCE HERNANDEZ   08/03/2022 2116 168 (H) 70 - 130 mg/dL Final     Comment:     Meter: [id number] : [id number]   08/03/2022 1700 82 70 - 130 mg/dL Final     Comment:     Meter: [id number] : [id number] Kymberly Espinoza   08/03/2022 1159 84 70 - 130 mg/dL Final     Comment:     Meter: [id number] : [id number] Orion JENNINGS   08/03/2022 0645 203 (H) 70 - 130 mg/dL Final     Comment:     Meter: [id number] : [id number] JENNY RIVERA   08/02/2022 2253 255 (H) 70 - 130 mg/dL Final     Comment:     Meter: [id number] : [id number] chika beltrán   08/02/2022 1659 132 (H) 70 - 130 mg/dL Final     Comment:     Meter: [id number] : [id number] ELAINE NARDA     Lab Results   Component Value Date    HGBA1C 7.20 (H) 07/22/2022     Lab Results   Component Value Date    TSH 3.130 02/26/2015    FREET4 0.78 (L) 02/26/2015       Blood Culture   Date Value Ref Range Status   07/23/2022 No growth at 24 hours  Preliminary   07/23/2022 No growth at 24 hours  Preliminary      No results found for: URINECX  Wound Culture   Date Value Ref Range Status   07/23/2022 Rare Gram Negative Bacilli (A)  Preliminary     No results found for: STOOLCX  No results found for: RESPCX  Pain Management Panel    There is no flowsheet data to display.           ----------------------------------------------------------------------------------------------------------------------  Imaging Results (Last 24 Hours)     ** No results found for the last 24 hours. **          Pertinent Infectious Disease Results    Urinalysis from 7/23/2022 positive with culture finalized as greater than 100,000 colonies of E. coli and greater than 100,000 colonies of Klebsiella pneumoniae. Wound culture of the sacrum from 7/23/2022 finalized as pan susceptible E. coli.   Blood cultures on 7/23/2022 finalized as no growth.  COVID-19 influenza PCR negative.  CT of the lumbar spine, abdomen and pelvis reports sacral decubitus ulcer with fluid and gas collection noted in the right lower buttock and soft tissue gas and soft tissue changes noted to the left of the midline overlying the sacrum as well, these abnormalities are in contact with the distal coccyx and ostium myelitis is not excluded, large colonic stool burden, degenerative disc disease lumbar spine. MRI of the pelvis from 7/25/2022 reports large right paramedian sacral decubitus ulceration with associated soft tissue edema noted but no well-defined fluid collection, no MRI signal characteristics of the bony structures of the pelvis indicate changes of osteomyelitis, diffuse soft tissue edema likely cellulitis, trace ascites in the lower pelvis. 2D echo on 7/29/2022 made no mention of vegetations.  Anaerobic culture of sacrum wound on 7/23/2022 finalized as Bacteroides fragilis.    ----------------------------------------------------------------------------------------------------------------------    Assessment/Plan     Patient was seen and examined by me today with  GOLDIE Bey and case was discussed with primary RN.    1.  Infected sacral decubitus ulcer    WBC is normal today at 8.01 with no fever reported overnight.  Sacral ulcer with no evidence of drainage or erythema.  Patient had an uneventful night with no issues reported, vitals are stable.  Seems to be fairly comfortable this morning with no evidence of acute distress.    With culture showing growth of Bacteroides fragilis, I added coverage with Flagyl 500 mg IV every 8 hours x10 days and can transition coverage to oral option upon discharge and to continue with the Rocephin/oral Omnicef through TODAY August 4, 2022.  Patient seems to be doing very well on current regimen with no evidence of clinical sepsis.  Leukocytosis has resolved.    2.  Severe sepsis with lactic acid greater than 2 on admission.    At this time patient seems to be stable with no evidence suggestive of clinical sepsis or hemodynamic instability we will continue to follow closely.  No fever reported overnight and leukocytosis resolved.    3.  New onset diarrhea.    Diarrhea seems to have improved since the addition of Flagyl and will continue to follow closely as patient is at high risk for C. difficile colitis and would check C. difficile toxin PCR if persistent diarrhea.  NO diarrhea reported today.          Code Status:   Code Status and Medical Interventions:   Ordered at: 07/30/22 0946     Medical Intervention Limits:    NO intubation (DNI)     Level Of Support Discussed With:    Next of Kin (If No Surrogate)     Code Status (Patient has no pulse and is not breathing):    No CPR (Do Not Attempt to Resuscitate)     Medical Interventions (Patient has pulse or is breathing):    Limited Support     Comments:    pts daughter Yecenia       GOLDIE Bey  08/04/22  12:44 EDT     Electronically signed by GOLDIE Bey, 08/04/22, 12:47 PM EDT.  Electronically signed by Tye Long MD, 08/04/22, 3:00 PM EDT.

## 2022-08-05 ENCOUNTER — HOSPITAL ENCOUNTER (OUTPATIENT)
Facility: HOSPITAL | Age: 72
Setting detail: HOSPITAL OUTPATIENT SURGERY
End: 2022-08-05
Attending: SURGERY | Admitting: SURGERY

## 2022-08-05 LAB
ANION GAP SERPL CALCULATED.3IONS-SCNC: 8.7 MMOL/L (ref 5–15)
BASOPHILS # BLD AUTO: 0.04 10*3/MM3 (ref 0–0.2)
BASOPHILS NFR BLD AUTO: 0.5 % (ref 0–1.5)
BUN SERPL-MCNC: 20 MG/DL (ref 8–23)
BUN/CREAT SERPL: 39.2 (ref 7–25)
CALCIUM SPEC-SCNC: 8.2 MG/DL (ref 8.6–10.5)
CHLORIDE SERPL-SCNC: 99 MMOL/L (ref 98–107)
CO2 SERPL-SCNC: 25.3 MMOL/L (ref 22–29)
CREAT SERPL-MCNC: 0.51 MG/DL (ref 0.57–1)
DEPRECATED RDW RBC AUTO: 58.2 FL (ref 37–54)
EGFRCR SERPLBLD CKD-EPI 2021: 100.6 ML/MIN/1.73
EOSINOPHIL # BLD AUTO: 0.02 10*3/MM3 (ref 0–0.4)
EOSINOPHIL NFR BLD AUTO: 0.2 % (ref 0.3–6.2)
ERYTHROCYTE [DISTWIDTH] IN BLOOD BY AUTOMATED COUNT: 16.7 % (ref 12.3–15.4)
GLUCOSE BLDC GLUCOMTR-MCNC: 158 MG/DL (ref 70–130)
GLUCOSE BLDC GLUCOMTR-MCNC: 160 MG/DL (ref 70–130)
GLUCOSE BLDC GLUCOMTR-MCNC: 200 MG/DL (ref 70–130)
GLUCOSE BLDC GLUCOMTR-MCNC: 243 MG/DL (ref 70–130)
GLUCOSE SERPL-MCNC: 145 MG/DL (ref 65–99)
HCT VFR BLD AUTO: 29.9 % (ref 34–46.6)
HGB BLD-MCNC: 9.4 G/DL (ref 12–15.9)
IMM GRANULOCYTES # BLD AUTO: 0.04 10*3/MM3 (ref 0–0.05)
IMM GRANULOCYTES NFR BLD AUTO: 0.5 % (ref 0–0.5)
LYMPHOCYTES # BLD AUTO: 1.93 10*3/MM3 (ref 0.7–3.1)
LYMPHOCYTES NFR BLD AUTO: 22.7 % (ref 19.6–45.3)
MCH RBC QN AUTO: 30.6 PG (ref 26.6–33)
MCHC RBC AUTO-ENTMCNC: 31.4 G/DL (ref 31.5–35.7)
MCV RBC AUTO: 97.4 FL (ref 79–97)
MONOCYTES # BLD AUTO: 0.55 10*3/MM3 (ref 0.1–0.9)
MONOCYTES NFR BLD AUTO: 6.5 % (ref 5–12)
NEUTROPHILS NFR BLD AUTO: 5.93 10*3/MM3 (ref 1.7–7)
NEUTROPHILS NFR BLD AUTO: 69.6 % (ref 42.7–76)
NRBC BLD AUTO-RTO: 0 /100 WBC (ref 0–0.2)
PLATELET # BLD AUTO: 388 10*3/MM3 (ref 140–450)
PMV BLD AUTO: 11.4 FL (ref 6–12)
POTASSIUM SERPL-SCNC: 3.6 MMOL/L (ref 3.5–5.2)
RBC # BLD AUTO: 3.07 10*6/MM3 (ref 3.77–5.28)
SODIUM SERPL-SCNC: 133 MMOL/L (ref 136–145)
WBC NRBC COR # BLD: 8.51 10*3/MM3 (ref 3.4–10.8)

## 2022-08-05 PROCEDURE — 99307 SBSQ NF CARE SF MDM 10: CPT | Performed by: INTERNAL MEDICINE

## 2022-08-05 PROCEDURE — 25010000002 HEPARIN (PORCINE) PER 1000 UNITS: Performed by: SURGERY

## 2022-08-05 PROCEDURE — 63710000001 INSULIN ASPART PER 5 UNITS: Performed by: SURGERY

## 2022-08-05 PROCEDURE — 82962 GLUCOSE BLOOD TEST: CPT

## 2022-08-05 PROCEDURE — 85025 COMPLETE CBC W/AUTO DIFF WBC: CPT | Performed by: SURGERY

## 2022-08-05 PROCEDURE — 63710000001 INSULIN DETEMIR PER 5 UNITS: Performed by: SURGERY

## 2022-08-05 PROCEDURE — 80048 BASIC METABOLIC PNL TOTAL CA: CPT | Performed by: SURGERY

## 2022-08-05 RX ADMIN — Medication 10 ML: at 08:22

## 2022-08-05 RX ADMIN — HEPARIN SODIUM 5000 UNITS: 5000 INJECTION INTRAVENOUS; SUBCUTANEOUS at 22:44

## 2022-08-05 RX ADMIN — ATORVASTATIN CALCIUM 40 MG: 40 TABLET, FILM COATED ORAL at 22:44

## 2022-08-05 RX ADMIN — ASPIRIN 81 MG: 81 TABLET, CHEWABLE ORAL at 08:21

## 2022-08-05 RX ADMIN — INSULIN ASPART 4 UNITS: 100 INJECTION, SOLUTION INTRAVENOUS; SUBCUTANEOUS at 16:41

## 2022-08-05 RX ADMIN — DONEPEZIL HYDROCHLORIDE 10 MG: 5 TABLET, FILM COATED ORAL at 22:44

## 2022-08-05 RX ADMIN — Medication 20 ML: at 08:22

## 2022-08-05 RX ADMIN — LEVOTHYROXINE SODIUM 25 MCG: 25 TABLET ORAL at 04:49

## 2022-08-05 RX ADMIN — METRONIDAZOLE 500 MG: 500 INJECTION, SOLUTION INTRAVENOUS at 04:49

## 2022-08-05 RX ADMIN — LOSARTAN POTASSIUM 50 MG: 50 TABLET, FILM COATED ORAL at 08:21

## 2022-08-05 RX ADMIN — INSULIN DETEMIR 20 UNITS: 100 INJECTION, SOLUTION SUBCUTANEOUS at 23:13

## 2022-08-05 RX ADMIN — INSULIN ASPART 2 UNITS: 100 INJECTION, SOLUTION INTRAVENOUS; SUBCUTANEOUS at 08:21

## 2022-08-05 RX ADMIN — ROPINIROLE HYDROCHLORIDE 0.5 MG: 0.25 TABLET, FILM COATED ORAL at 22:44

## 2022-08-05 RX ADMIN — Medication 10 ML: at 22:45

## 2022-08-05 RX ADMIN — INSULIN ASPART 2 UNITS: 100 INJECTION, SOLUTION INTRAVENOUS; SUBCUTANEOUS at 11:00

## 2022-08-05 RX ADMIN — Medication 5000 UNITS: at 08:21

## 2022-08-05 RX ADMIN — METRONIDAZOLE 500 MG: 500 INJECTION, SOLUTION INTRAVENOUS at 11:00

## 2022-08-05 RX ADMIN — HEPARIN SODIUM 5000 UNITS: 5000 INJECTION INTRAVENOUS; SUBCUTANEOUS at 08:21

## 2022-08-05 RX ADMIN — ISOSORBIDE MONONITRATE 30 MG: 30 TABLET, EXTENDED RELEASE ORAL at 08:21

## 2022-08-05 NOTE — PLAN OF CARE
Goal Outcome Evaluation:   Patients wound care completed per orders. Swing bed extended. No issues noted at this time.

## 2022-08-05 NOTE — CASE MANAGEMENT/SOCIAL WORK
Discharge Planning Assessment  DINA Nuñez     Patient Name: Yecenia Botello  MRN: [id number]  Today's Date: 8/5/2022    Admit Date: 7/30/2022           Discharge Plan     Row Name 08/05/22 1452       Plan    Plan Pt was admitted to swing bed on 7/30/22. SS received Physician consult for swing bed. SS spoke with Pt's daughter, Yecenia on this date. Pt lives with her daughter, Yecenia and family plans for her to return home at discharge. Pt does not currently utilize home health services. [facility] has utilized [facility] in the past. Pt has a peg tube, tube feeding supplies, wheelchair, reinaldo lift, and hospital bed via [facility]. Pt will need EMS to transport arranged at discharge. Swing bed RN noted swing bed days have been approved until 08/11/22. SS to follow.                HELGA Gonzalez

## 2022-08-05 NOTE — PLAN OF CARE
Problem: Skin Injury Risk Increased  Goal: Skin Health and Integrity  Outcome: Ongoing, Progressing  Intervention: Optimize Skin Protection  Recent Flowsheet Documentation  Taken 8/4/2022 2012 by Naima Hicks RN  Pressure Reduction Techniques: frequent weight shift encouraged  Pressure Reduction Devices: pressure-redistributing mattress utilized  Skin Protection: adhesive use limited   Goal Outcome Evaluation:  Plan of Care Reviewed With: patient        Progress: no change  Outcome Evaluation: Patient appears to be resting well at this time. Will continue to monitor.

## 2022-08-05 NOTE — CASE MANAGEMENT/SOCIAL WORK
Patient has been approved for additional swing bed days through 8/11/22 with clinical updates for  due on 8/10/22 provider and SS notified via secure chat.

## 2022-08-05 NOTE — PROGRESS NOTES
Nutrition Services    Patient Name:  Yecenia Botello  YOB: 1950  MRN: [id number]  Admit Date:  7/30/2022    Swing bed note - Tolerating TF at goal rate - Isosource 1.5 @ 55ml/h with 25ml/hr auto flush - Recommend MVI to promote wound healing      Electronically signed by:  Charu Briones RD  08/05/22 12:59 EDT

## 2022-08-06 LAB
ANION GAP SERPL CALCULATED.3IONS-SCNC: 7.4 MMOL/L (ref 5–15)
ANISOCYTOSIS BLD QL: ABNORMAL
BASOPHILS # BLD MANUAL: 0.07 10*3/MM3 (ref 0–0.2)
BASOPHILS NFR BLD MANUAL: 1 % (ref 0–1.5)
BUN SERPL-MCNC: 19 MG/DL (ref 8–23)
BUN/CREAT SERPL: 38.8 (ref 7–25)
CALCIUM SPEC-SCNC: 8.1 MG/DL (ref 8.6–10.5)
CHLORIDE SERPL-SCNC: 100 MMOL/L (ref 98–107)
CO2 SERPL-SCNC: 25.6 MMOL/L (ref 22–29)
CREAT SERPL-MCNC: 0.49 MG/DL (ref 0.57–1)
DEPRECATED RDW RBC AUTO: 58.2 FL (ref 37–54)
EGFRCR SERPLBLD CKD-EPI 2021: 101.5 ML/MIN/1.73
ERYTHROCYTE [DISTWIDTH] IN BLOOD BY AUTOMATED COUNT: 16.5 % (ref 12.3–15.4)
GLUCOSE BLDC GLUCOMTR-MCNC: 155 MG/DL (ref 70–130)
GLUCOSE BLDC GLUCOMTR-MCNC: 158 MG/DL (ref 70–130)
GLUCOSE BLDC GLUCOMTR-MCNC: 189 MG/DL (ref 70–130)
GLUCOSE BLDC GLUCOMTR-MCNC: 231 MG/DL (ref 70–130)
GLUCOSE SERPL-MCNC: 202 MG/DL (ref 65–99)
HCT VFR BLD AUTO: 27.1 % (ref 34–46.6)
HGB BLD-MCNC: 8.5 G/DL (ref 12–15.9)
LYMPHOCYTES # BLD MANUAL: 1.59 10*3/MM3 (ref 0.7–3.1)
LYMPHOCYTES NFR BLD MANUAL: 4 % (ref 5–12)
MCH RBC QN AUTO: 30.5 PG (ref 26.6–33)
MCHC RBC AUTO-ENTMCNC: 31.4 G/DL (ref 31.5–35.7)
MCV RBC AUTO: 97.1 FL (ref 79–97)
MONOCYTES # BLD: 0.28 10*3/MM3 (ref 0.1–0.9)
NEUTROPHILS # BLD AUTO: 4.97 10*3/MM3 (ref 1.7–7)
NEUTROPHILS NFR BLD MANUAL: 72 % (ref 42.7–76)
PLAT MORPH BLD: NORMAL
PLATELET # BLD AUTO: 334 10*3/MM3 (ref 140–450)
PMV BLD AUTO: 11.2 FL (ref 6–12)
POTASSIUM SERPL-SCNC: 3.9 MMOL/L (ref 3.5–5.2)
RBC # BLD AUTO: 2.79 10*6/MM3 (ref 3.77–5.28)
SCAN SLIDE: NORMAL
SODIUM SERPL-SCNC: 133 MMOL/L (ref 136–145)
VARIANT LYMPHS NFR BLD MANUAL: 23 % (ref 19.6–45.3)
WBC NRBC COR # BLD: 6.9 10*3/MM3 (ref 3.4–10.8)

## 2022-08-06 PROCEDURE — 80048 BASIC METABOLIC PNL TOTAL CA: CPT | Performed by: SURGERY

## 2022-08-06 PROCEDURE — 25010000002 HEPARIN (PORCINE) PER 1000 UNITS: Performed by: SURGERY

## 2022-08-06 PROCEDURE — 63710000001 INSULIN DETEMIR PER 5 UNITS: Performed by: HOSPITALIST

## 2022-08-06 PROCEDURE — 85007 BL SMEAR W/DIFF WBC COUNT: CPT | Performed by: SURGERY

## 2022-08-06 PROCEDURE — 99307 SBSQ NF CARE SF MDM 10: CPT | Performed by: NURSE PRACTITIONER

## 2022-08-06 PROCEDURE — 85025 COMPLETE CBC W/AUTO DIFF WBC: CPT | Performed by: SURGERY

## 2022-08-06 PROCEDURE — 82962 GLUCOSE BLOOD TEST: CPT

## 2022-08-06 PROCEDURE — 63710000001 INSULIN ASPART PER 5 UNITS: Performed by: SURGERY

## 2022-08-06 RX ADMIN — ROPINIROLE HYDROCHLORIDE 0.5 MG: 0.25 TABLET, FILM COATED ORAL at 20:28

## 2022-08-06 RX ADMIN — DONEPEZIL HYDROCHLORIDE 10 MG: 5 TABLET, FILM COATED ORAL at 20:28

## 2022-08-06 RX ADMIN — LEVOTHYROXINE SODIUM 25 MCG: 25 TABLET ORAL at 06:53

## 2022-08-06 RX ADMIN — INSULIN ASPART 2 UNITS: 100 INJECTION, SOLUTION INTRAVENOUS; SUBCUTANEOUS at 08:16

## 2022-08-06 RX ADMIN — Medication 5000 UNITS: at 08:15

## 2022-08-06 RX ADMIN — Medication 20 ML: at 08:17

## 2022-08-06 RX ADMIN — METRONIDAZOLE 500 MG: 500 INJECTION, SOLUTION INTRAVENOUS at 03:49

## 2022-08-06 RX ADMIN — Medication 20 ML: at 05:17

## 2022-08-06 RX ADMIN — HEPARIN SODIUM 5000 UNITS: 5000 INJECTION INTRAVENOUS; SUBCUTANEOUS at 08:15

## 2022-08-06 RX ADMIN — ASPIRIN 81 MG: 81 TABLET, CHEWABLE ORAL at 08:15

## 2022-08-06 RX ADMIN — ATORVASTATIN CALCIUM 40 MG: 40 TABLET, FILM COATED ORAL at 20:28

## 2022-08-06 RX ADMIN — HEPARIN SODIUM 5000 UNITS: 5000 INJECTION INTRAVENOUS; SUBCUTANEOUS at 20:28

## 2022-08-06 RX ADMIN — Medication 10 ML: at 20:29

## 2022-08-06 RX ADMIN — INSULIN ASPART 2 UNITS: 100 INJECTION, SOLUTION INTRAVENOUS; SUBCUTANEOUS at 11:39

## 2022-08-06 RX ADMIN — INSULIN ASPART 2 UNITS: 100 INJECTION, SOLUTION INTRAVENOUS; SUBCUTANEOUS at 16:35

## 2022-08-06 RX ADMIN — INSULIN DETEMIR 22 UNITS: 100 INJECTION, SOLUTION SUBCUTANEOUS at 20:29

## 2022-08-06 RX ADMIN — METRONIDAZOLE 500 MG: 500 INJECTION, SOLUTION INTRAVENOUS at 20:27

## 2022-08-06 RX ADMIN — ISOSORBIDE MONONITRATE 30 MG: 30 TABLET, EXTENDED RELEASE ORAL at 08:16

## 2022-08-06 RX ADMIN — LOSARTAN POTASSIUM 50 MG: 50 TABLET, FILM COATED ORAL at 08:15

## 2022-08-06 RX ADMIN — Medication 20 ML: at 20:29

## 2022-08-06 RX ADMIN — METRONIDAZOLE 500 MG: 500 INJECTION, SOLUTION INTRAVENOUS at 11:39

## 2022-08-06 RX ADMIN — Medication 10 ML: at 08:17

## 2022-08-06 NOTE — PROGRESS NOTES
PROGRESS NOTE         Patient Identification:  Name:  Yecenia Botello  Age:  71 y.o.  Sex:  female  :  1950  MRN:  [id number]  Visit Number:  [id number]  Primary Care Provider:  Johnathan Ashraf MD         LOS: 7 days       ----------------------------------------------------------------------------------------------------------------------  Subjective       Chief Complaints:    No chief complaint on file.        Interval History:      Patient resting in bed.  No issues reported overnight.  Continues on 2 L per nasal cannula for apparent distress.  WBC normal.    Review of Systems:    Unable to obtain.  Patient is nonverbal.  ----------------------------------------------------------------------------------------------------------------------      Objective       Current Hospital Meds:  aspirin, 81 mg, Per PEG Tube, Daily  atorvastatin, 40 mg, Per PEG Tube, Nightly  Dakins (full strength), 20 mL, Topical, Q12H  donepezil, 10 mg, Per PEG Tube, Nightly  heparin (porcine), 5,000 Units, Subcutaneous, Q12H  Insulin Aspart, 0-9 Units, Subcutaneous, TID AC  insulin detemir, 22 Units, Subcutaneous, Nightly  isosorbide mononitrate, 30 mg, Oral, Q24H  levothyroxine, 25 mcg, Per PEG Tube, QAM  losartan, 50 mg, Per PEG Tube, Q24H  metroNIDAZOLE, 500 mg, Intravenous, Q8H  rOPINIRole, 0.5 mg, Per PEG Tube, Nightly  sodium chloride, 10 mL, Intravenous, Q12H  [START ON 2022] tuberculin, 5 Units, Intradermal, Once  vitamin D3, 5,000 Units, Per PEG Tube, Daily         ----------------------------------------------------------------------------------------------------------------------    Vital Signs:  Temp:  [98 °F (36.7 °C)-98.2 °F (36.8 °C)] 98.2 °F (36.8 °C)  Heart Rate:  [78-82] 78  Resp:  [18] 18  BP: (128-137)/(71-72) 128/72  No data found.  SpO2 Percentage    22 0600 22 1900 22 0700   SpO2: 98% 97% 96%     SpO2:  [96 %-97 %] 96 %  on  Flow (L/min):  [2] 2;   Device (Oxygen Therapy):  nasal cannula    Body mass index is 22.92 kg/m².  Wt Readings from Last 3 Encounters:   08/06/22 60.6 kg (133 lb 9.6 oz)   07/30/22 61.5 kg (135 lb 8 oz)   02/26/15 95.7 kg (211 lb)        Intake/Output Summary (Last 24 hours) at 8/6/2022 1316  Last data filed at 8/6/2022 0827  Gross per 24 hour   Intake 3019 ml   Output 350 ml   Net 2669 ml     NPO Diet NPO Type: Strict NPO  ----------------------------------------------------------------------------------------------------------------------      Physical Exam:       Constitutional: Elderly, chronically ill-appearing female resting comfortably in bed in no apparent distress.  Awake and alert and appears very comfortable.  HENT:  Head: Normocephalic and atraumatic.  Mouth:  Moist mucous membranes.    Eyes:  Conjunctivae and EOM are normal.  No scleral icterus.  Neck:  Neck supple.  No JVD present.    Cardiovascular:  Normal rate, regular rhythm. No edema. 3/6 systolic murmur.  Pulmonary/Chest:  No respiratory distress, no wheezes, no crackles, with normal breath sounds and good air movement.  Abdominal: Abdomen is soft and nondistended with normal bowel sounds. Seems to be nontender, as there is no grimacing or guarding.  Musculoskeletal:  No edema, no tenderness, and no deformity.  No swelling or redness of joints.  Neurological: Awake and alert.  Nonverbal.  No facial droop.   Skin:  Skin is warm and dry.  No rash noted.  No pallor.  Stage IV decubitus ulcer to sacrum, dressing in place.  Psychiatric:  Calm and cooperative.  Behavior is normal.    ----------------------------------------------------------------------------------------------------------------------            Results from last 7 days   Lab Units 08/06/22  0554 08/05/22  0318 08/04/22  0118   WBC 10*3/mm3 6.90 8.51 8.01   HEMOGLOBIN g/dL 8.5* 9.4* 7.7*   HEMATOCRIT % 27.1* 29.9* 24.6*   MCV fL 97.1* 97.4* 97.6*   MCHC g/dL 31.4* 31.4* 31.3*   PLATELETS 10*3/mm3 334 981 312     Results from last 7  days   Lab Units 08/06/22  0554 08/05/22  0318 08/04/22  0118   SODIUM mmol/L 133* 133* 134*   POTASSIUM mmol/L 3.9 3.6 4.2   CHLORIDE mmol/L 100 99 101   CO2 mmol/L 25.6 25.3 25.1   BUN mg/dL 19 20 23   CREATININE mg/dL 0.49* 0.51* 0.43*   CALCIUM mg/dL 8.1* 8.2* 8.0*   GLUCOSE mg/dL 202* 145* 138*   Estimated Creatinine Clearance: 102.2 mL/min (A) (by C-G formula based on SCr of 0.49 mg/dL (L)).  No results found for: AMMONIA    Glucose   Date/Time Value Ref Range Status   08/06/2022 1134 155 (H) 70 - 130 mg/dL Final     Comment:     Meter: [id number] : [id number] MIGNON DOWD   08/06/2022 0703 158 (H) 70 - 130 mg/dL Final     Comment:     Meter: [id number] : [id number] Shanita Schmidt   08/05/2022 2307 243 (H) 70 - 130 mg/dL Final     Comment:     Meter: [id number] : [id number] Jory Espinosa   08/05/2022 1638 200 (H) 70 - 130 mg/dL Final     Comment:     Meter: [id number] : [id number] oswaldo candis   08/05/2022 1038 160 (H) 70 - 130 mg/dL Final     Comment:     Meter: [id number] : [id number] oswaldo candis   08/05/2022 0612 158 (H) 70 - 130 mg/dL Final     Comment:     Meter: [id number] : [id number] DULCE HERNANDEZ   08/04/2022 1959 139 (H) 70 - 130 mg/dL Final     Comment:     Meter: [id number] : [id number] maggie simms   08/04/2022 1657 137 (H) 70 - 130 mg/dL Final     Comment:     Meter: [id number] : [id number] Orion JENNINGS     Lab Results   Component Value Date    HGBA1C 7.20 (H) 07/22/2022     Lab Results   Component Value Date    TSH 3.130 02/26/2015    FREET4 0.78 (L) 02/26/2015       Blood Culture   Date Value Ref Range Status   07/23/2022 No growth at 24 hours  Preliminary   07/23/2022 No growth at 24 hours  Preliminary     No results found for: URINECX  Wound Culture   Date Value Ref Range Status   07/23/2022 Rare Gram Negative Bacilli (A)  Preliminary     No results found for: STOOLCX  No results found for: RESPCX  Pain Management Panel    There is no flowsheet data to  display.           ----------------------------------------------------------------------------------------------------------------------  Imaging Results (Last 24 Hours)     ** No results found for the last 24 hours. **          Pertinent Infectious Disease Results    Urinalysis from 7/23/2022 positive with culture finalized as greater than 100,000 colonies of E. coli and greater than 100,000 colonies of Klebsiella pneumoniae. Wound culture of the sacrum from 7/23/2022 finalized as pan susceptible E. coli.   Blood cultures on 7/23/2022 finalized as no growth.  COVID-19 influenza PCR negative.  CT of the lumbar spine, abdomen and pelvis reports sacral decubitus ulcer with fluid and gas collection noted in the right lower buttock and soft tissue gas and soft tissue changes noted to the left of the midline overlying the sacrum as well, these abnormalities are in contact with the distal coccyx and ostium myelitis is not excluded, large colonic stool burden, degenerative disc disease lumbar spine. MRI of the pelvis from 7/25/2022 reports large right paramedian sacral decubitus ulceration with associated soft tissue edema noted but no well-defined fluid collection, no MRI signal characteristics of the bony structures of the pelvis indicate changes of osteomyelitis, diffuse soft tissue edema likely cellulitis, trace ascites in the lower pelvis. 2D echo on 7/29/2022 made no mention of vegetations.  Anaerobic culture of sacrum wound on 7/23/2022 finalized as Bacteroides fragilis. Status post debridement of sacral wound with partial secondary closure on 8/4/2022.     ----------------------------------------------------------------------------------------------------------------------    Assessment/Plan     1.  Infected sacral decubitus ulcer    Status post debridement of sacral wound with partial closure on 8/4/2022.    With culture showing growth of Bacteroides fragilis, continue with Flagyl 500 mg IV every 8 hours through  8/8/22 and can transition coverage to oral option upon discharge. Patient completed a course of Rocephin on August 4, 2022.  Patient seems to be doing very well on current regimen with no evidence of clinical sepsis.  Leukocytosis has resolved.    2.  Severe sepsis with lactic acid greater than 2 on admission.    At this time patient seems to be stable with no evidence suggestive of clinical sepsis or hemodynamic instability we will continue to follow closely.  No fever reported overnight and leukocytosis resolved.    3.  New onset diarrhea.    Diarrhea seems to have improved since the addition of Flagyl and will continue to follow closely as patient is at high risk for C. difficile colitis and would check C. difficile toxin PCR if persistent diarrhea.  NO diarrhea reported today.        Code Status:   Code Status and Medical Interventions:   Ordered at: 07/30/22 0946     Medical Intervention Limits:    NO intubation (DNI)     Level Of Support Discussed With:    Next of Kin (If No Surrogate)     Code Status (Patient has no pulse and is not breathing):    No CPR (Do Not Attempt to Resuscitate)     Medical Interventions (Patient has pulse or is breathing):    Limited Support     Comments:    pts daughter Yecenia Lopez, APRN  08/06/22  13:16 EDT

## 2022-08-06 NOTE — PLAN OF CARE
Goal Outcome Evaluation:              Outcome Evaluation: Pt resting in bed. No acute chnages. Vital signs stable. Call light in reach. Bed locked and lowered.

## 2022-08-06 NOTE — PLAN OF CARE
Goal Outcome Evaluation:  Plan of Care Reviewed With: patient        Progress: no change  Outcome Evaluation: Pt resting in bed. Vital signs stable. No acute changes. Will continue to monitor.

## 2022-08-07 LAB
ANION GAP SERPL CALCULATED.3IONS-SCNC: 10.9 MMOL/L (ref 5–15)
ANISOCYTOSIS BLD QL: NORMAL
BASOPHILS # BLD AUTO: 0.02 10*3/MM3 (ref 0–0.2)
BASOPHILS NFR BLD AUTO: 0.2 % (ref 0–1.5)
BUN SERPL-MCNC: 15 MG/DL (ref 8–23)
BUN/CREAT SERPL: 34.9 (ref 7–25)
CALCIUM SPEC-SCNC: 8.1 MG/DL (ref 8.6–10.5)
CHLORIDE SERPL-SCNC: 101 MMOL/L (ref 98–107)
CO2 SERPL-SCNC: 23.1 MMOL/L (ref 22–29)
CREAT SERPL-MCNC: 0.43 MG/DL (ref 0.57–1)
DEPRECATED RDW RBC AUTO: 58.4 FL (ref 37–54)
EGFRCR SERPLBLD CKD-EPI 2021: 104.8 ML/MIN/1.73
EOSINOPHIL # BLD AUTO: 0.01 10*3/MM3 (ref 0–0.4)
EOSINOPHIL NFR BLD AUTO: 0.1 % (ref 0.3–6.2)
ERYTHROCYTE [DISTWIDTH] IN BLOOD BY AUTOMATED COUNT: 16.6 % (ref 12.3–15.4)
GLUCOSE BLDC GLUCOMTR-MCNC: 146 MG/DL (ref 70–130)
GLUCOSE BLDC GLUCOMTR-MCNC: 184 MG/DL (ref 70–130)
GLUCOSE BLDC GLUCOMTR-MCNC: 189 MG/DL (ref 70–130)
GLUCOSE BLDC GLUCOMTR-MCNC: 240 MG/DL (ref 70–130)
GLUCOSE SERPL-MCNC: 221 MG/DL (ref 65–99)
HCT VFR BLD AUTO: 28.1 % (ref 34–46.6)
HGB BLD-MCNC: 8.8 G/DL (ref 12–15.9)
HYPOCHROMIA BLD QL: NORMAL
IMM GRANULOCYTES # BLD AUTO: 0.03 10*3/MM3 (ref 0–0.05)
IMM GRANULOCYTES NFR BLD AUTO: 0.4 % (ref 0–0.5)
LARGE PLATELETS: NORMAL
LYMPHOCYTES # BLD AUTO: 2.19 10*3/MM3 (ref 0.7–3.1)
LYMPHOCYTES NFR BLD AUTO: 27.3 % (ref 19.6–45.3)
MCH RBC QN AUTO: 30.6 PG (ref 26.6–33)
MCHC RBC AUTO-ENTMCNC: 31.3 G/DL (ref 31.5–35.7)
MCV RBC AUTO: 97.6 FL (ref 79–97)
MONOCYTES # BLD AUTO: 0.41 10*3/MM3 (ref 0.1–0.9)
MONOCYTES NFR BLD AUTO: 5.1 % (ref 5–12)
NEUTROPHILS NFR BLD AUTO: 5.37 10*3/MM3 (ref 1.7–7)
NEUTROPHILS NFR BLD AUTO: 66.9 % (ref 42.7–76)
NRBC BLD AUTO-RTO: 0 /100 WBC (ref 0–0.2)
PLATELET # BLD AUTO: 307 10*3/MM3 (ref 140–450)
PMV BLD AUTO: 11.4 FL (ref 6–12)
POTASSIUM SERPL-SCNC: 4.3 MMOL/L (ref 3.5–5.2)
RBC # BLD AUTO: 2.88 10*6/MM3 (ref 3.77–5.28)
SODIUM SERPL-SCNC: 135 MMOL/L (ref 136–145)
WBC NRBC COR # BLD: 8.03 10*3/MM3 (ref 3.4–10.8)

## 2022-08-07 PROCEDURE — 85025 COMPLETE CBC W/AUTO DIFF WBC: CPT | Performed by: SURGERY

## 2022-08-07 PROCEDURE — 80048 BASIC METABOLIC PNL TOTAL CA: CPT | Performed by: SURGERY

## 2022-08-07 PROCEDURE — 63710000001 INSULIN DETEMIR PER 5 UNITS: Performed by: HOSPITALIST

## 2022-08-07 PROCEDURE — 63710000001 INSULIN ASPART PER 5 UNITS: Performed by: SURGERY

## 2022-08-07 PROCEDURE — 25010000002 HEPARIN (PORCINE) PER 1000 UNITS: Performed by: SURGERY

## 2022-08-07 PROCEDURE — 82962 GLUCOSE BLOOD TEST: CPT

## 2022-08-07 PROCEDURE — 85007 BL SMEAR W/DIFF WBC COUNT: CPT | Performed by: SURGERY

## 2022-08-07 RX ADMIN — METRONIDAZOLE 500 MG: 500 INJECTION, SOLUTION INTRAVENOUS at 10:46

## 2022-08-07 RX ADMIN — ISOSORBIDE MONONITRATE 30 MG: 30 TABLET, EXTENDED RELEASE ORAL at 08:25

## 2022-08-07 RX ADMIN — INSULIN ASPART 2 UNITS: 100 INJECTION, SOLUTION INTRAVENOUS; SUBCUTANEOUS at 16:51

## 2022-08-07 RX ADMIN — HYDROCODONE BITARTRATE AND ACETAMINOPHEN 10 ML: 7.5; 325 SOLUTION ORAL at 14:23

## 2022-08-07 RX ADMIN — METRONIDAZOLE 500 MG: 500 INJECTION, SOLUTION INTRAVENOUS at 20:44

## 2022-08-07 RX ADMIN — ATORVASTATIN CALCIUM 40 MG: 40 TABLET, FILM COATED ORAL at 20:45

## 2022-08-07 RX ADMIN — LOSARTAN POTASSIUM 50 MG: 50 TABLET, FILM COATED ORAL at 08:25

## 2022-08-07 RX ADMIN — ROPINIROLE HYDROCHLORIDE 0.5 MG: 0.25 TABLET, FILM COATED ORAL at 20:45

## 2022-08-07 RX ADMIN — HYDROCODONE BITARTRATE AND ACETAMINOPHEN 10 ML: 7.5; 325 SOLUTION ORAL at 02:59

## 2022-08-07 RX ADMIN — METRONIDAZOLE 500 MG: 500 INJECTION, SOLUTION INTRAVENOUS at 03:00

## 2022-08-07 RX ADMIN — ASPIRIN 81 MG: 81 TABLET, CHEWABLE ORAL at 08:25

## 2022-08-07 RX ADMIN — DONEPEZIL HYDROCHLORIDE 10 MG: 5 TABLET, FILM COATED ORAL at 20:45

## 2022-08-07 RX ADMIN — Medication 10 ML: at 20:46

## 2022-08-07 RX ADMIN — HEPARIN SODIUM 5000 UNITS: 5000 INJECTION INTRAVENOUS; SUBCUTANEOUS at 20:45

## 2022-08-07 RX ADMIN — Medication 5000 UNITS: at 08:25

## 2022-08-07 RX ADMIN — HEPARIN SODIUM 5000 UNITS: 5000 INJECTION INTRAVENOUS; SUBCUTANEOUS at 08:25

## 2022-08-07 RX ADMIN — INSULIN DETEMIR 25 UNITS: 100 INJECTION, SOLUTION SUBCUTANEOUS at 20:52

## 2022-08-07 RX ADMIN — Medication 10 ML: at 08:25

## 2022-08-07 RX ADMIN — LEVOTHYROXINE SODIUM 25 MCG: 25 TABLET ORAL at 06:25

## 2022-08-07 RX ADMIN — Medication 20 ML: at 20:45

## 2022-08-07 RX ADMIN — INSULIN ASPART 4 UNITS: 100 INJECTION, SOLUTION INTRAVENOUS; SUBCUTANEOUS at 08:26

## 2022-08-07 RX ADMIN — Medication 20 ML: at 08:26

## 2022-08-07 NOTE — PLAN OF CARE
Goal Outcome Evaluation:           Progress: no change  Outcome Evaluation: Patient has rested well today.  PRN pain med given prior to dressing change, tolerated well.  Tube feeding continues, patient is tolerating well.  Continue current plan of care.

## 2022-08-07 NOTE — PLAN OF CARE
Goal Outcome Evaluation:  Plan of Care Reviewed With: patient        Progress: no change  Outcome Evaluation: Pt has rested in bed overnight. VSS. Wound care completed per orders. No acute changes overnight.

## 2022-08-07 NOTE — PROGRESS NOTES
PROGRESS NOTE         Patient Identification:  Name:  Yecenia Botello  Age:  71 y.o.  Sex:  female  :  1950  MRN:  [id number]  Visit Number:  [id number]  Primary Care Provider:  Johnathan Ashraf MD         LOS: 8 days       ----------------------------------------------------------------------------------------------------------------------  Subjective       Chief Complaints:    No chief complaint on file.        Interval History:      Patient resting comfortably in bed this morning.  Overall stable on 2 L per nasal cannula with no apparent distress.  WBC normal.  Afebrile.    Review of Systems:    Unable to obtain.  Patient is nonverbal.  ----------------------------------------------------------------------------------------------------------------------      Objective       Current Hospital Meds:  aspirin, 81 mg, Per PEG Tube, Daily  atorvastatin, 40 mg, Per PEG Tube, Nightly  Dakins (full strength), 20 mL, Topical, Q12H  donepezil, 10 mg, Per PEG Tube, Nightly  heparin (porcine), 5,000 Units, Subcutaneous, Q12H  Insulin Aspart, 0-9 Units, Subcutaneous, TID AC  insulin detemir, 25 Units, Subcutaneous, Nightly  isosorbide mononitrate, 30 mg, Oral, Q24H  levothyroxine, 25 mcg, Per PEG Tube, QAM  losartan, 50 mg, Per PEG Tube, Q24H  metroNIDAZOLE, 500 mg, Intravenous, Q8H  rOPINIRole, 0.5 mg, Per PEG Tube, Nightly  sodium chloride, 10 mL, Intravenous, Q12H  [START ON 2022] tuberculin, 5 Units, Intradermal, Once  vitamin D3, 5,000 Units, Per PEG Tube, Daily         ----------------------------------------------------------------------------------------------------------------------    Vital Signs:  Temp:  [98 °F (36.7 °C)-99.1 °F (37.3 °C)] 99.1 °F (37.3 °C)  Heart Rate:  [82-83] 83  Resp:  [16-18] 16  BP: (125-144)/(58-62) 144/62  No data found.  SpO2 Percentage    22 0700 22 1832 22 0635   SpO2: 96% 97% 95%  Comment: 2294     SpO2:  [95 %-97 %] 95 %  on  Flow (L/min):  [2]  2;   Device (Oxygen Therapy): nasal cannula    Body mass index is 22.92 kg/m².  Wt Readings from Last 3 Encounters:   08/06/22 60.6 kg (133 lb 9.6 oz)   07/30/22 61.5 kg (135 lb 8 oz)   02/26/15 95.7 kg (211 lb)        Intake/Output Summary (Last 24 hours) at 8/7/2022 0931  Last data filed at 8/7/2022 0400  Gross per 24 hour   Intake 1060.78 ml   Output 300 ml   Net 760.78 ml     NPO Diet NPO Type: Strict NPO  ----------------------------------------------------------------------------------------------------------------------      Physical Exam:       Constitutional: Elderly, chronically ill-appearing female resting comfortably in bed in no apparent distress.  Awake and alert and appears very comfortable.  HENT:  Head: Normocephalic and atraumatic.  Mouth:  Moist mucous membranes.    Eyes:  Conjunctivae and EOM are normal.  No scleral icterus.  Neck:  Neck supple.  No JVD present.    Cardiovascular:  Normal rate, regular rhythm. No edema. 3/6 systolic murmur.  Pulmonary/Chest:  No respiratory distress, no wheezes, no crackles, with normal breath sounds and good air movement.  Abdominal: Abdomen is soft and nondistended with normal bowel sounds. Nontender, as there is no grimacing or guarding.  Musculoskeletal:  No edema, no tenderness, and no deformity.  No swelling or redness of joints.  Neurological: Awake and alert.  Nonverbal.  No facial droop.   Skin:  Skin is warm and dry.  No rash noted.  No pallor.  Stage IV decubitus ulcer to sacrum, dressing in place.  Psychiatric:  Calm and cooperative.  Behavior is normal.    ----------------------------------------------------------------------------------------------------------------------            Results from last 7 days   Lab Units 08/07/22  0132 08/06/22  0554 08/05/22  0318   WBC 10*3/mm3 8.03 6.90 8.51   HEMOGLOBIN g/dL 8.8* 8.5* 9.4*   HEMATOCRIT % 28.1* 27.1* 29.9*   MCV fL 97.6* 97.1* 97.4*   MCHC g/dL 31.3* 31.4* 31.4*   PLATELETS 10*3/mm3 749 513 101      Results from last 7 days   Lab Units 08/07/22  0132 08/06/22  0554 08/05/22  0318   SODIUM mmol/L 135* 133* 133*   POTASSIUM mmol/L 4.3 3.9 3.6   CHLORIDE mmol/L 101 100 99   CO2 mmol/L 23.1 25.6 25.3   BUN mg/dL 15 19 20   CREATININE mg/dL 0.43* 0.49* 0.51*   CALCIUM mg/dL 8.1* 8.1* 8.2*   GLUCOSE mg/dL 221* 202* 145*   Estimated Creatinine Clearance: 116.5 mL/min (A) (by C-G formula based on SCr of 0.43 mg/dL (L)).  No results found for: AMMONIA    Glucose   Date/Time Value Ref Range Status   08/07/2022 0638 240 (H) 70 - 130 mg/dL Final     Comment:     Meter: [id number] : [id number] SWATI MEDRANO   08/06/2022 2026 231 (H) 70 - 130 mg/dL Final     Comment:     Meter: [id number] : [id number] ROSAMARIA HUI   08/06/2022 1616 189 (H) 70 - 130 mg/dL Final     Comment:     Meter: [id number] : [id number] MIGNON DAMONSANDRA   08/06/2022 1134 155 (H) 70 - 130 mg/dL Final     Comment:     Meter: [id number] : [id number] MIGNON DAMONSANDRA   08/06/2022 0703 158 (H) 70 - 130 mg/dL Final     Comment:     Meter: [id number] : [id number] Shanita Schmidt   08/05/2022 2307 243 (H) 70 - 130 mg/dL Final     Comment:     Meter: [id number] : [id number] Jory Espinosa   08/05/2022 1638 200 (H) 70 - 130 mg/dL Final     Comment:     Meter: [id number] : [id number] oswaldo chirinos   08/05/2022 1038 160 (H) 70 - 130 mg/dL Final     Comment:     Meter: [id number] : [id number] oswaldo chirinos     Lab Results   Component Value Date    HGBA1C 7.20 (H) 07/22/2022     Lab Results   Component Value Date    TSH 3.130 02/26/2015    FREET4 0.78 (L) 02/26/2015       Blood Culture   Date Value Ref Range Status   07/23/2022 No growth at 24 hours  Preliminary   07/23/2022 No growth at 24 hours  Preliminary     No results found for: URINECX  Wound Culture   Date Value Ref Range Status   07/23/2022 Rare Gram Negative Bacilli (A)  Preliminary     No results found for: STOOLCX  No results found for: RESPCX  Pain Management  Panel    There is no flowsheet data to display.           ----------------------------------------------------------------------------------------------------------------------  Imaging Results (Last 24 Hours)     ** No results found for the last 24 hours. **          Pertinent Infectious Disease Results    Urinalysis from 7/23/2022 positive with culture finalized as greater than 100,000 colonies of E. coli and greater than 100,000 colonies of Klebsiella pneumoniae. Wound culture of the sacrum from 7/23/2022 finalized as pan susceptible E. coli.   Blood cultures on 7/23/2022 finalized as no growth.  COVID-19 influenza PCR negative.  CT of the lumbar spine, abdomen and pelvis reports sacral decubitus ulcer with fluid and gas collection noted in the right lower buttock and soft tissue gas and soft tissue changes noted to the left of the midline overlying the sacrum as well, these abnormalities are in contact with the distal coccyx and ostium myelitis is not excluded, large colonic stool burden, degenerative disc disease lumbar spine. MRI of the pelvis from 7/25/2022 reports large right paramedian sacral decubitus ulceration with associated soft tissue edema noted but no well-defined fluid collection, no MRI signal characteristics of the bony structures of the pelvis indicate changes of osteomyelitis, diffuse soft tissue edema likely cellulitis, trace ascites in the lower pelvis. 2D echo on 7/29/2022 made no mention of vegetations.  Anaerobic culture of sacrum wound on 7/23/2022 finalized as Bacteroides fragilis. Status post debridement of sacral wound with partial secondary closure on 8/4/2022.     ----------------------------------------------------------------------------------------------------------------------    Assessment/Plan     1.  Infected sacral decubitus ulcer    Status post debridement of sacral wound with partial closure on 8/4/2022.    With culture showing growth of Bacteroides fragilis, continue with  Flagyl 500 mg IV every 8 hours through 8/8/22 and can transition coverage to oral option upon discharge. Patient completed a course of Rocephin on August 4, 2022.  Patient seems to be doing very well on current regimen with no evidence of clinical sepsis.  Leukocytosis has resolved.    2.  Severe sepsis with lactic acid greater than 2 on admission.    At this time patient seems to be stable with no evidence suggestive of clinical sepsis or hemodynamic instability we will continue to follow closely.  No fever reported overnight and leukocytosis resolved.    3.  New onset diarrhea.    Diarrhea seems to have improved since the addition of Flagyl and will continue to follow closely as patient is at high risk for C. difficile colitis and would check C. difficile toxin PCR if persistent diarrhea.  NO diarrhea reported today.        Code Status:   Code Status and Medical Interventions:   Ordered at: 07/30/22 0946     Medical Intervention Limits:    NO intubation (DNI)     Level Of Support Discussed With:    Next of Kin (If No Surrogate)     Code Status (Patient has no pulse and is not breathing):    No CPR (Do Not Attempt to Resuscitate)     Medical Interventions (Patient has pulse or is breathing):    Limited Support     Comments:    pts daughter Yecenia Lopez, APRN  08/07/22  09:57 EDT

## 2022-08-08 LAB
ALBUMIN SERPL-MCNC: 1.88 G/DL (ref 3.5–5.2)
ALBUMIN/GLOB SERPL: 0.5 G/DL
ALP SERPL-CCNC: 47 U/L (ref 39–117)
ALT SERPL W P-5'-P-CCNC: 11 U/L (ref 1–33)
ANION GAP SERPL CALCULATED.3IONS-SCNC: 7 MMOL/L (ref 5–15)
ANISOCYTOSIS BLD QL: NORMAL
AST SERPL-CCNC: 22 U/L (ref 1–32)
BASOPHILS # BLD AUTO: 0.02 10*3/MM3 (ref 0–0.2)
BASOPHILS NFR BLD AUTO: 0.3 % (ref 0–1.5)
BILIRUB SERPL-MCNC: 0.2 MG/DL (ref 0–1.2)
BUN SERPL-MCNC: 17 MG/DL (ref 8–23)
BUN/CREAT SERPL: 43.6 (ref 7–25)
CALCIUM SPEC-SCNC: 8 MG/DL (ref 8.6–10.5)
CHLORIDE SERPL-SCNC: 103 MMOL/L (ref 98–107)
CO2 SERPL-SCNC: 23 MMOL/L (ref 22–29)
CREAT SERPL-MCNC: 0.39 MG/DL (ref 0.57–1)
DEPRECATED RDW RBC AUTO: 57.8 FL (ref 37–54)
EGFRCR SERPLBLD CKD-EPI 2021: 107.3 ML/MIN/1.73
EOSINOPHIL # BLD AUTO: 0.04 10*3/MM3 (ref 0–0.4)
EOSINOPHIL NFR BLD AUTO: 0.5 % (ref 0.3–6.2)
ERYTHROCYTE [DISTWIDTH] IN BLOOD BY AUTOMATED COUNT: 16.6 % (ref 12.3–15.4)
GLOBULIN UR ELPH-MCNC: 3.4 GM/DL
GLUCOSE BLDC GLUCOMTR-MCNC: 115 MG/DL (ref 70–130)
GLUCOSE BLDC GLUCOMTR-MCNC: 132 MG/DL (ref 70–130)
GLUCOSE BLDC GLUCOMTR-MCNC: 151 MG/DL (ref 70–130)
GLUCOSE BLDC GLUCOMTR-MCNC: 169 MG/DL (ref 70–130)
GLUCOSE SERPL-MCNC: 152 MG/DL (ref 65–99)
HCT VFR BLD AUTO: 25.1 % (ref 34–46.6)
HGB BLD-MCNC: 8 G/DL (ref 12–15.9)
IMM GRANULOCYTES # BLD AUTO: 0.02 10*3/MM3 (ref 0–0.05)
IMM GRANULOCYTES NFR BLD AUTO: 0.3 % (ref 0–0.5)
LYMPHOCYTES # BLD AUTO: 2.55 10*3/MM3 (ref 0.7–3.1)
LYMPHOCYTES NFR BLD AUTO: 33.4 % (ref 19.6–45.3)
MCH RBC QN AUTO: 30.8 PG (ref 26.6–33)
MCHC RBC AUTO-ENTMCNC: 31.9 G/DL (ref 31.5–35.7)
MCV RBC AUTO: 96.5 FL (ref 79–97)
MONOCYTES # BLD AUTO: 0.42 10*3/MM3 (ref 0.1–0.9)
MONOCYTES NFR BLD AUTO: 5.5 % (ref 5–12)
NEUTROPHILS NFR BLD AUTO: 4.59 10*3/MM3 (ref 1.7–7)
NEUTROPHILS NFR BLD AUTO: 60 % (ref 42.7–76)
NRBC BLD AUTO-RTO: 0 /100 WBC (ref 0–0.2)
PLAT MORPH BLD: NORMAL
PLATELET # BLD AUTO: 294 10*3/MM3 (ref 140–450)
PMV BLD AUTO: 11.2 FL (ref 6–12)
POTASSIUM SERPL-SCNC: 4.3 MMOL/L (ref 3.5–5.2)
PROT SERPL-MCNC: 5.3 G/DL (ref 6–8.5)
RBC # BLD AUTO: 2.6 10*6/MM3 (ref 3.77–5.28)
SODIUM SERPL-SCNC: 133 MMOL/L (ref 136–145)
WBC NRBC COR # BLD: 7.64 10*3/MM3 (ref 3.4–10.8)

## 2022-08-08 PROCEDURE — 80053 COMPREHEN METABOLIC PANEL: CPT | Performed by: HOSPITALIST

## 2022-08-08 PROCEDURE — 82962 GLUCOSE BLOOD TEST: CPT

## 2022-08-08 PROCEDURE — 99308 SBSQ NF CARE LOW MDM 20: CPT | Performed by: STUDENT IN AN ORGANIZED HEALTH CARE EDUCATION/TRAINING PROGRAM

## 2022-08-08 PROCEDURE — 85007 BL SMEAR W/DIFF WBC COUNT: CPT | Performed by: SURGERY

## 2022-08-08 PROCEDURE — 25010000002 HEPARIN (PORCINE) PER 1000 UNITS: Performed by: SURGERY

## 2022-08-08 PROCEDURE — 63710000001 INSULIN ASPART PER 5 UNITS: Performed by: SURGERY

## 2022-08-08 PROCEDURE — 99307 SBSQ NF CARE SF MDM 10: CPT | Performed by: INTERNAL MEDICINE

## 2022-08-08 PROCEDURE — 85025 COMPLETE CBC W/AUTO DIFF WBC: CPT | Performed by: SURGERY

## 2022-08-08 RX ADMIN — DONEPEZIL HYDROCHLORIDE 10 MG: 5 TABLET, FILM COATED ORAL at 21:26

## 2022-08-08 RX ADMIN — ATORVASTATIN CALCIUM 40 MG: 40 TABLET, FILM COATED ORAL at 21:25

## 2022-08-08 RX ADMIN — HEPARIN SODIUM 5000 UNITS: 5000 INJECTION INTRAVENOUS; SUBCUTANEOUS at 21:26

## 2022-08-08 RX ADMIN — Medication 5000 UNITS: at 08:29

## 2022-08-08 RX ADMIN — METRONIDAZOLE 500 MG: 500 INJECTION, SOLUTION INTRAVENOUS at 03:15

## 2022-08-08 RX ADMIN — METRONIDAZOLE 500 MG: 500 INJECTION, SOLUTION INTRAVENOUS at 21:25

## 2022-08-08 RX ADMIN — ASPIRIN 81 MG: 81 TABLET, CHEWABLE ORAL at 08:29

## 2022-08-08 RX ADMIN — Medication 10 ML: at 21:26

## 2022-08-08 RX ADMIN — HYDROCODONE BITARTRATE AND ACETAMINOPHEN 10 ML: 7.5; 325 SOLUTION ORAL at 00:07

## 2022-08-08 RX ADMIN — Medication 20 ML: at 08:30

## 2022-08-08 RX ADMIN — Medication 10 ML: at 08:30

## 2022-08-08 RX ADMIN — INSULIN ASPART 2 UNITS: 100 INJECTION, SOLUTION INTRAVENOUS; SUBCUTANEOUS at 16:33

## 2022-08-08 RX ADMIN — Medication 20 ML: at 21:25

## 2022-08-08 RX ADMIN — ISOSORBIDE MONONITRATE 30 MG: 30 TABLET, EXTENDED RELEASE ORAL at 08:29

## 2022-08-08 RX ADMIN — METRONIDAZOLE 500 MG: 500 INJECTION, SOLUTION INTRAVENOUS at 11:47

## 2022-08-08 RX ADMIN — ROPINIROLE HYDROCHLORIDE 0.5 MG: 0.25 TABLET, FILM COATED ORAL at 21:26

## 2022-08-08 RX ADMIN — HEPARIN SODIUM 5000 UNITS: 5000 INJECTION INTRAVENOUS; SUBCUTANEOUS at 08:29

## 2022-08-08 RX ADMIN — LEVOTHYROXINE SODIUM 25 MCG: 25 TABLET ORAL at 06:01

## 2022-08-08 RX ADMIN — INSULIN ASPART 2 UNITS: 100 INJECTION, SOLUTION INTRAVENOUS; SUBCUTANEOUS at 08:30

## 2022-08-08 RX ADMIN — LOSARTAN POTASSIUM 50 MG: 50 TABLET, FILM COATED ORAL at 08:29

## 2022-08-08 NOTE — PROGRESS NOTES
PROGRESS NOTE         Patient Identification:  Name:  Yecenia Botello  Age:  71 y.o.  Sex:  female  :  1950  MRN:  [id number]  Visit Number:  [id number]  Primary Care Provider:  Johnathan Ashraf MD         LOS: 9 days       ----------------------------------------------------------------------------------------------------------------------  Subjective       Chief Complaints:    No chief complaint on file.        Interval History:      Patient resting comfortably in bed this morning.  No issues reported overnight.  Currently on 2 L per nasal cannula with no apparent distress.  Afebrile, no diarrhea reported.  Abdomen soft nontender.  Lungs clear to auscultation bilaterally.    Review of Systems:    Unable to obtain.  Patient is nonverbal.  ----------------------------------------------------------------------------------------------------------------------      Objective       Current Hospital Meds:  aspirin, 81 mg, Per PEG Tube, Daily  atorvastatin, 40 mg, Per PEG Tube, Nightly  Dakins (full strength), 20 mL, Topical, Q12H  donepezil, 10 mg, Per PEG Tube, Nightly  heparin (porcine), 5,000 Units, Subcutaneous, Q12H  Insulin Aspart, 0-9 Units, Subcutaneous, TID AC  insulin detemir, 25 Units, Subcutaneous, Nightly  isosorbide mononitrate, 30 mg, Oral, Q24H  levothyroxine, 25 mcg, Per PEG Tube, QAM  losartan, 50 mg, Per PEG Tube, Q24H  metroNIDAZOLE, 500 mg, Intravenous, Q8H  rOPINIRole, 0.5 mg, Per PEG Tube, Nightly  sodium chloride, 10 mL, Intravenous, Q12H  [START ON 2022] tuberculin, 5 Units, Intradermal, Once  vitamin D3, 5,000 Units, Per PEG Tube, Daily         ----------------------------------------------------------------------------------------------------------------------    Vital Signs:  Temp:  [98.8 °F (37.1 °C)-98.9 °F (37.2 °C)] 98.8 °F (37.1 °C)  Heart Rate:  [83-92] 83  Resp:  [18-20] 20  BP: (130-141)/(60-70) 130/70  No data found.  SpO2 Percentage    22 0635 22  1900 08/08/22 0617   SpO2: 95%  Comment: 2294 95% 97%     SpO2:  [95 %-97 %] 97 %  on  Flow (L/min):  [2] 2;   Device (Oxygen Therapy): nasal cannula    Body mass index is 22.68 kg/m².  Wt Readings from Last 3 Encounters:   08/08/22 60 kg (132 lb 3.2 oz)   07/30/22 61.5 kg (135 lb 8 oz)   02/26/15 95.7 kg (211 lb)        Intake/Output Summary (Last 24 hours) at 8/8/2022 0937  Last data filed at 8/8/2022 0559  Gross per 24 hour   Intake 2796 ml   Output 400 ml   Net 2396 ml     NPO Diet NPO Type: Strict NPO  ----------------------------------------------------------------------------------------------------------------------      Physical Exam:       Constitutional: Elderly, chronically ill-appearing female resting comfortably in bed in no apparent distress.   HENT:  Head: Normocephalic and atraumatic.  Mouth:  Moist mucous membranes.    Eyes:  Conjunctivae and EOM are normal.  No scleral icterus.  Neck:  Neck supple.  No JVD present.    Cardiovascular:  Normal rate, regular rhythm. No edema. 3/6 systolic murmur.  Pulmonary/Chest:  No respiratory distress, no wheezes, no crackles, with normal breath sounds and good air movement.  Abdominal: Abdomen is soft and nondistended with normal bowel sounds. Nontender, as there is no grimacing or guarding.  Musculoskeletal:  No edema, no tenderness, and no deformity.  No swelling or redness of joints.  Neurological: Awake and alert.  Nonverbal.  No facial droop.   Skin:  Skin is warm and dry.  No rash noted.  No pallor.  Stage IV decubitus ulcer to sacrum, dressing in place.  Psychiatric:  Calm and cooperative.  Behavior is normal.    ----------------------------------------------------------------------------------------------------------------------            Results from last 7 days   Lab Units 08/08/22  0724 08/07/22  0132 08/06/22  0554   WBC 10*3/mm3 7.64 8.03 6.90   HEMOGLOBIN g/dL 8.0* 8.8* 8.5*   HEMATOCRIT % 25.1* 28.1* 27.1*   MCV fL 96.5 97.6* 97.1*   MCHC g/dL  31.9 31.3* 31.4*   PLATELETS 10*3/mm3 294 307 334     Results from last 7 days   Lab Units 08/08/22  0724 08/07/22  0132 08/06/22  0554   SODIUM mmol/L 133* 135* 133*   POTASSIUM mmol/L 4.3 4.3 3.9   CHLORIDE mmol/L 103 101 100   CO2 mmol/L 23.0 23.1 25.6   BUN mg/dL 17 15 19   CREATININE mg/dL 0.39* 0.43* 0.49*   CALCIUM mg/dL 8.0* 8.1* 8.1*   GLUCOSE mg/dL 152* 221* 202*   ALBUMIN g/dL 1.88*  --   --    BILIRUBIN mg/dL 0.2  --   --    ALK PHOS U/L 47  --   --    AST (SGOT) U/L 22  --   --    ALT (SGPT) U/L 11  --   --    Estimated Creatinine Clearance: 127.1 mL/min (A) (by C-G formula based on SCr of 0.39 mg/dL (L)).  No results found for: AMMONIA    Glucose   Date/Time Value Ref Range Status   08/08/2022 0620 169 (H) 70 - 130 mg/dL Final     Comment:     Meter: [id number] : [id number] LONGORIA MITCHELL   08/07/2022 2044 189 (H) 70 - 130 mg/dL Final     Comment:     Meter: [id number] : [id number] OBDULIA FUSION   08/07/2022 1633 184 (H) 70 - 130 mg/dL Final     Comment:     Meter: [id number] : [id number] Bright Sharlene   08/07/2022 1045 146 (H) 70 - 130 mg/dL Final     Comment:     Meter: [id number] : [id number] Bright Sharlene   08/07/2022 0638 240 (H) 70 - 130 mg/dL Final     Comment:     Meter: [id number] : [id number] LONGORIA MITCHELL   08/06/2022 2026 231 (H) 70 - 130 mg/dL Final     Comment:     Meter: [id number] : [id number] ROSAMARIA AMES   08/06/2022 1616 189 (H) 70 - 130 mg/dL Final     Comment:     Meter: [id number] : [id number] MIGNON DOWD   08/06/2022 1134 155 (H) 70 - 130 mg/dL Final     Comment:     Meter: [id number] : [id number] MIGNON DOWD     Lab Results   Component Value Date    HGBA1C 7.20 (H) 07/22/2022     Lab Results   Component Value Date    TSH 3.130 02/26/2015    FREET4 0.78 (L) 02/26/2015       Blood Culture   Date Value Ref Range Status   07/23/2022 No growth at 24 hours  Preliminary   07/23/2022 No growth at 24 hours  Preliminary     No results  found for: URINECX  Wound Culture   Date Value Ref Range Status   07/23/2022 Rare Gram Negative Bacilli (A)  Preliminary     No results found for: STOOLCX  No results found for: RESPCX  Pain Management Panel    There is no flowsheet data to display.           ----------------------------------------------------------------------------------------------------------------------  Imaging Results (Last 24 Hours)     ** No results found for the last 24 hours. **          Pertinent Infectious Disease Results    Urinalysis from 7/23/2022 positive with culture finalized as greater than 100,000 colonies of E. coli and greater than 100,000 colonies of Klebsiella pneumoniae. Wound culture of the sacrum from 7/23/2022 finalized as pan susceptible E. coli.   Blood cultures on 7/23/2022 finalized as no growth.  COVID-19 influenza PCR negative.  CT of the lumbar spine, abdomen and pelvis reports sacral decubitus ulcer with fluid and gas collection noted in the right lower buttock and soft tissue gas and soft tissue changes noted to the left of the midline overlying the sacrum as well, these abnormalities are in contact with the distal coccyx and ostium myelitis is not excluded, large colonic stool burden, degenerative disc disease lumbar spine. MRI of the pelvis from 7/25/2022 reports large right paramedian sacral decubitus ulceration with associated soft tissue edema noted but no well-defined fluid collection, no MRI signal characteristics of the bony structures of the pelvis indicate changes of osteomyelitis, diffuse soft tissue edema likely cellulitis, trace ascites in the lower pelvis. 2D echo on 7/29/2022 made no mention of vegetations.  Anaerobic culture of sacrum wound on 7/23/2022 finalized as Bacteroides fragilis. Status post debridement of sacral wound with partial secondary closure on 8/4/2022.      ----------------------------------------------------------------------------------------------------------------------    Assessment/Plan     1.  Infected sacral decubitus ulcer    Patient was seen and examined with Jannette Lopez and case discussed with primary RN who was at bedside with no issues reported overnight currently remains on 2 L nasal cannula seems to be very comfortable with no fever and no diarrhea.  Very stable from respiratory standpoint with no evidence of abdominal pain.    Status post debridement of sacral wound with partial closure on 8/4/2022.    With culture showing growth of Bacteroides fragilis, continue with Flagyl 500 mg IV every 8 hours through 8/8/22 and can transition coverage to oral option upon discharge. Patient completed a course of Rocephin on August 4, 2022.  Patient seems to be doing very well on current regimen with no evidence of clinical sepsis.  Leukocytosis has resolved.    WBC at 7.64 today.    2.  Severe sepsis with lactic acid greater than 2 on admission.    At this time patient seems to be stable with no evidence suggestive of clinical sepsis or hemodynamic instability we will continue to follow closely.  No fever reported overnight and leukocytosis resolved.    3.  New onset diarrhea.    Diarrhea resolved after the addition of Flagyl and will continue to follow closely as patient is at high risk for C. difficile colitis and would check C. difficile toxin PCR if persistent diarrhea.  NO diarrhea reported today.        Code Status:   Code Status and Medical Interventions:   Ordered at: 07/30/22 0946     Medical Intervention Limits:    NO intubation (DNI)     Level Of Support Discussed With:    Next of Kin (If No Surrogate)     Code Status (Patient has no pulse and is not breathing):    No CPR (Do Not Attempt to Resuscitate)     Medical Interventions (Patient has pulse or is breathing):    Limited Support     Comments:    pts daughter Yecenia Lopez,  GOLDIE  08/08/22  09:37 EDT     Electronically signed by GOLDIE Bey, 08/08/22, 9:38 AM EDT.  Electronically signed by Tye Long MD, 08/08/22, 11:41 AM EDT.

## 2022-08-08 NOTE — CASE MANAGEMENT/SOCIAL WORK
Discharge Planning Assessment  [facility]     Patient Name: Yecenia Botello  MRN: [id number]  Today's Date: 8/8/2022    Admit Date: 7/30/2022     Discharge Plan     Row Name 08/08/22 1700       Plan    Plan Pt was admitted to swing bed on 7/30/22. Pt lives with her daughter, Yecenia and family plans for her to return home at discharge. Pt does not currently utilize home health services. Pt has utilized [facility] home in the past. Pt has a peg tube, tube feeding supplies, wheelchair, reinaldo lift, and hospital bed via [facility]. Pt will need EMS to transport arranged at discharge. Swing bed RN noted swing bed days have been approved until 08/11/22. SS to follow.                   Selected Continued Care - Prior Encounters Includes selections from prior encounters from 5/1/2022 to 8/8/2022    Discharged on 7/30/2022 Admission date: 7/22/2022 - Discharge disposition: Skilled Nursing Facility ([facility])    Destination     Service Provider Selected Services Address Phone Fax Patient Preferred    [facility]  [place] [id number] [phone] -- --                    CHANTELLE Shukla

## 2022-08-08 NOTE — PLAN OF CARE
Goal Outcome Evaluation:              Outcome Evaluation: Patient in bed on assessment. VSS. Wound care completed per order. Tube feeding in place at 55ml. Will continue plan of care.

## 2022-08-08 NOTE — PROGRESS NOTES
"Palliative Care Daily Progress Note     S: Clinically, the patient is doing better and has had no diarrhea.  She remains on Flagyl and her wound is slowly improving.  The patient is nonverbal and appears to be in no acute distress.  O:   Palliative Performance Scale Score:     /70 (BP Location: Left arm, Patient Position: Lying)   Pulse 83   Temp 98.8 °F (37.1 °C) (Axillary)   Resp 20   Ht 162.6 cm (64.02\")   Wt 60 kg (132 lb 3.2 oz)   SpO2 97%   BMI 22.68 kg/m²     Intake/Output Summary (Last 24 hours) at 8/8/2022 1453  Last data filed at 8/8/2022 0559  Gross per 24 hour   Intake 2796 ml   Output 400 ml   Net 2396 ml       PE:  General Appearance:    Chronically ill appearing,NAD   HEENT:    NC/AT   Neck:   supple   Lungs:     CTAB without w/r/r    Heart:    RRR   Abdomen:     Soft, nondistended   Extremities:    no edema   Pulses:   Pulses palpable    Skin:  Sacral decubitus with dressing   Neurologic:  Nonverbal   Psych:   Calm         Meds: Reviewed    Labs:   Results from last 7 days   Lab Units 08/08/22  0724   WBC 10*3/mm3 7.64   HEMOGLOBIN g/dL 8.0*   HEMATOCRIT % 25.1*   PLATELETS 10*3/mm3 294     Results from last 7 days   Lab Units 08/08/22  0724   SODIUM mmol/L 133*   POTASSIUM mmol/L 4.3   CHLORIDE mmol/L 103   CO2 mmol/L 23.0   BUN mg/dL 17   CREATININE mg/dL 0.39*   GLUCOSE mg/dL 152*   CALCIUM mg/dL 8.0*     Results from last 7 days   Lab Units 08/08/22  0724   SODIUM mmol/L 133*   POTASSIUM mmol/L 4.3   CHLORIDE mmol/L 103   CO2 mmol/L 23.0   BUN mg/dL 17   CREATININE mg/dL 0.39*   CALCIUM mg/dL 8.0*   BILIRUBIN mg/dL 0.2   ALK PHOS U/L 47   ALT (SGPT) U/L 11   AST (SGOT) U/L 22   GLUCOSE mg/dL 152*     Imaging Results (Last 72 Hours)     ** No results found for the last 72 hours. **            Diagnostics: Reviewed    A:  clinically, the patient is slowly improving.  No evidence of C. difficile as her diarrhea has gotten better.      P: Continue present medications and wound care.  " Infectious disease note appreciated.  No changes at this time.      We will continue to follow along. Please do not hesitate to contact us regarding further sx mgmt or GOC needs, including after hours or on weekends via our on call provider at [phone].     Jf Cruz MD    8/8/2022

## 2022-08-08 NOTE — CONSULTS
Consult Note     Patient Identification:  Name:  Yecenia Botello  Age:  71 y.o.  Sex:  female  :  1950  MRN:  [id number]   Visit Number:  [id number]  Primary Care Physician:  Johnathan Ashraf MD     Subjective     Chief complaint:     Pressure injury     History of presenting illness:     Patient is a 71 y.o. female with history of stroke with significant residual deficits, now non-ambulatory and minimally verbal, who presents to ED on 2022 with complaints of worsening sacral decubitus ulcer per family. She was reportedly admitted to [facility] in 2022 for MARLON and failure to thrive, during which time she developed a sacral decubitus ulcer for which she was sent home with silvadene dressings and wound care instructions. Her family has been treating with  honey. However, her wound has gotten worse with increasing exudative drainage that is foul smelling. She had area debrided on 2022 by Dr. Ivory. Penrose drain placed to right gluteal. Please note history obtained from chart review and [place] as patient is unable to update HPI.    22  Resting in bed. Not communicative. NAD noted. Had recent extensive surgical debridement in OR. Tolerating treatments.  ---------------------------------------------------------------------------------------------------------------------   Review of Systems:  Review of Systems   Unable to perform ROS: Patient nonverbal      ---------------------------------------------------------------------------------------------------------------------   Past Medical History:   Diagnosis Date   • Stroke (cerebrum) (HCC)      Past Surgical History:   Procedure Laterality Date   • DEBRIDEMENT OF ISCHIAL ULCER/BUTTOCKS WOUND N/A 2022    Procedure: DEBRIDEMENT OF ISCHIAL ULCER/BUTTOCKS WOUND;  Surgeon: Daja Ivory MD;  Location: [facility];  Service: General;  Laterality: N/A;   • DEBRIDEMENT OF ISCHIAL ULCER/BUTTOCKS WOUND N/A 2022    Procedure:  DEBRIDEMENT OF ISCHIAL ULCER/BUTTOCKS WOUND;  Surgeon: Daja Ivory MD;  Location: [facility];  Service: General;  Laterality: N/A;     History reviewed. No pertinent family history.  Social History     Socioeconomic History   • Marital status:    Tobacco Use   • Smoking status: Never Smoker   Vaping Use   • Vaping Use: Unknown   Substance and Sexual Activity   • Alcohol use: Defer   • Drug use: Defer   • Sexual activity: Defer     ---------------------------------------------------------------------------------------------------------------------   Allergies:  Patient has no known allergies.  ---------------------------------------------------------------------------------------------------------------------   Medications below are reported home medications pulling from within the system; at this time, these medications have not been reconciled unless otherwise specified and are in the verification process for further verifcation as current home medications.    Prior to Admission Medications     Prescriptions Last Dose Informant Patient Reported? Taking?    amLODIPine (NORVASC) 10 MG tablet Unknown Child Yes No    Take 10 mg by mouth Daily.    aspirin 81 MG EC tablet Unknown Child Yes No    Take 81 mg by mouth Daily.    atorvastatin (LIPITOR) 40 MG tablet Unknown Child Yes No    Take 40 mg by mouth Every Night.    docusate sodium (COLACE) 100 MG capsule Unknown Child Yes No    Take 100 mg by mouth 2 (Two) Times a Day.    donepezil (ARICEPT) 10 MG tablet Unknown Child Yes No    Take 10 mg by mouth Every Night.    gabapentin (NEURONTIN) 300 MG capsule Unknown Child Yes No    Take 300 mg by mouth 2 (Two) Times a Day.    Insulin Glargine (BASAGLAR KWIKPEN) 100 UNIT/ML injection pen Unknown Child Yes No    Inject 30 Units under the skin into the appropriate area as directed Every Night.    Insulin Lispro (humaLOG) 100 UNIT/ML injection Unknown Child Yes No    Inject 6 Units under the skin into the  appropriate area as directed 3 (Three) Times a Day Before Meals.    isosorbide mononitrate (IMDUR) 30 MG 24 hr tablet Unknown Child Yes No    Take 30 mg by mouth Daily.    levothyroxine (SYNTHROID, LEVOTHROID) 25 MCG tablet Unknown Child Yes No    Take 25 mcg by mouth Daily.    rOPINIRole (REQUIP) 0.5 MG tablet Unknown Child Yes No    Take 0.5 mg by mouth Every Night. Take 1 hour before bedtime.    vitamin D3 125 MCG (5000 UT) capsule capsule Unknown Child Yes No    Take 5,000 Units by mouth Daily.        ---------------------------------------------------------------------------------------------------------------------    Objective     Hospital Scheduled Meds:  aspirin, 81 mg, Per PEG Tube, Daily  atorvastatin, 40 mg, Per PEG Tube, Nightly  Dakins (full strength), 20 mL, Topical, Q12H  donepezil, 10 mg, Per PEG Tube, Nightly  heparin (porcine), 5,000 Units, Subcutaneous, Q12H  Insulin Aspart, 0-9 Units, Subcutaneous, TID AC  insulin detemir, 25 Units, Subcutaneous, Nightly  isosorbide mononitrate, 30 mg, Oral, Q24H  levothyroxine, 25 mcg, Per PEG Tube, QAM  losartan, 50 mg, Per PEG Tube, Q24H  metroNIDAZOLE, 500 mg, Intravenous, Q8H  rOPINIRole, 0.5 mg, Per PEG Tube, Nightly  sodium chloride, 10 mL, Intravenous, Q12H  [START ON 8/13/2022] tuberculin, 5 Units, Intradermal, Once  vitamin D3, 5,000 Units, Per PEG Tube, Daily           Current listed hospital scheduled medications may not yet reflect those currently placed in orders that are signed and held, awaiting patient's arrival to floor/unit.    ---------------------------------------------------------------------------------------------------------------------   Vital Signs:  Temp:  [98.8 °F (37.1 °C)-98.9 °F (37.2 °C)] 98.8 °F (37.1 °C)  Heart Rate:  [83-92] 83  Resp:  [18-20] 20  BP: (130-141)/(60-70) 130/70  No data found.  SpO2 Percentage    08/07/22 0635 08/07/22 1900 08/08/22 0617   SpO2: 95%  Comment: 2294 95% 97%     SpO2:  [95 %-97 %] 97 %  on  Flow  (L/min):  [2] 2;   Device (Oxygen Therapy): nasal cannula    Body mass index is 22.68 kg/m².  Wt Readings from Last 3 Encounters:   08/08/22 60 kg (132 lb 3.2 oz)   07/30/22 61.5 kg (135 lb 8 oz)   02/26/15 95.7 kg (211 lb)       ---------------------------------------------------------------------------------------------------------------------   Physical Exam:  Physical Exam  Constitutional:       General: She is not in acute distress.     Appearance: She is not toxic-appearing.   HENT:      Head: Normocephalic and atraumatic.   Cardiovascular:      Rate and Rhythm: Normal rate and regular rhythm.   Pulmonary:      Effort: Pulmonary effort is normal. No respiratory distress.   Abdominal:      General: Abdomen is flat. There is no distension.   Musculoskeletal:      Cervical back: Normal range of motion and neck supple.   Skin:     General: Skin is warm and dry.      Comments: Large pressure injury in the sacral area now extending to the buttocks post sharp debridement. Base appears essentially clean wit no purulence or malodor noted.   Neurological:      General: No focal deficit present.      Mental Status: She is alert. Mental status is at baseline.   Psychiatric:         Mood and Affect: Mood normal.         Behavior: Behavior normal.       Assessment & Plan      Recommend routine turning and pressure re-distribution. Turn q 2 hours while in bed, every 15 minutes if in a chair. Float heels. Recommend adequate hydration, along with protein and vitamin intake. Open wounds can serve as a nidus for local and systemic infection. Needs monitoring. Recommend low airloss/alternating pressure support surface.    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 Recommend saline  moistened gauze BID. Consider a wound vac.     Diabetes   -Recommend adequate glycemic control to help promote wound healing       Recommend follow-up in outpatient wound  clinic once stable for discharge.    Boubacar Puckett PA-C  Consults

## 2022-08-08 NOTE — PROGRESS NOTES
[facility] HOSPITALIST PROGRESS NOTE     Patient Identification:  Name:  Yecenia Botello  Age:  71 y.o.  Sex:  female  :  1950  MRN:  [id number]  Visit Number:  [id number]  ROOM: [address] [place] [address]     Primary Care Provider:  Johnathan Ashraf MD    Length of stay in inpatient status:  9    Subjective     Chief Compliant:  No chief complaint on file.      History of Presenting Illness: Patient seen and evaluated in follow-up for large sacral decubitus ulcer status postdebridement with Bacteroides fragilis, E. coli, Klebsiella species on wound culture.  Patient currently admitted to swing bed status for ongoing antibiotics and wound care.  Patient with history of nonambulatory nonverbal patient minimally interactive during exam.  Appears to be in no obvious discomfort.    Objective     Current Hospital Meds:  aspirin, 81 mg, Per PEG Tube, Daily  atorvastatin, 40 mg, Per PEG Tube, Nightly  Dakins (full strength), 20 mL, Topical, Q12H  donepezil, 10 mg, Per PEG Tube, Nightly  heparin (porcine), 5,000 Units, Subcutaneous, Q12H  Insulin Aspart, 0-9 Units, Subcutaneous, TID AC  insulin detemir, 25 Units, Subcutaneous, Nightly  isosorbide mononitrate, 30 mg, Oral, Q24H  levothyroxine, 25 mcg, Per PEG Tube, QAM  losartan, 50 mg, Per PEG Tube, Q24H  metroNIDAZOLE, 500 mg, Intravenous, Q8H  rOPINIRole, 0.5 mg, Per PEG Tube, Nightly  sodium chloride, 10 mL, Intravenous, Q12H  [START ON 2022] tuberculin, 5 Units, Intradermal, Once  vitamin D3, 5,000 Units, Per PEG Tube, Daily         ----------------------------------------------------------------------------------------------------------------------  Vital Signs:  Temp:  [98.2 °F (36.8 °C)-98.8 °F (37.1 °C)] 98.2 °F (36.8 °C)  Heart Rate:  [83-92] 84  Resp:  [16-20] 16  BP: (130-141)/(62-70) 130/62  SpO2:  [97 %] 97 %  on  Flow (L/min):  [2] 2;   Device (Oxygen Therapy): nasal cannula  Body mass index is 22.68 kg/m².      Intake/Output Summary (Last 24 hours)  at 8/8/2022 1947  Last data filed at 8/8/2022 1600  Gross per 24 hour   Intake 2796 ml   Output 700 ml   Net 2096 ml      ----------------------------------------------------------------------------------------------------------------------  Physical exam:  Constitutional: Chronically ill-appearing female resting in bed.  No apparent distress.  HENT:  Head:  Normocephalic and atraumatic.  Mouth:  Moist mucous membranes.    Eyes:  Conjunctivae and EOM are normal. No scleral icterus.      Cardiovascular:  Normal rate, regular rhythm and normal heart sounds with no murmur.  Pulmonary/Chest:  No respiratory distress, no wheezes, no crackles, with normal breath sounds and good air movement.  Abdominal:  Soft.  Bowel sounds are normal.  No distension and no tenderness.   Musculoskeletal:  No tenderness and no deformity.  No red or swollen joints anywhere.  Neurological: Unable to assess orientation due to nonverbal status.  Does not participate in exam.  Reflexes appear intact.  Noted slight intermittent movement of the left upper extremity.  Skin:  Skin is warm and dry.  Large decubitus ulcer present with packing.  Peripheral vascular:  Pulses in all 4 extremities.  ----------------------------------------------------------------------------------------------------------------------  CBC - WBC/Hgb/Hct/Plts: 7.64/8.0*/25.1*/294 (08/08 0724)  CHEM - BUN/Cr/glu/ALT/AST/amyl/lip:17/0.39*/152*/11/22/--/-- (08/08 0724)  LYTES - Na/K/Cl/CO2: 133*/4.3/103/23.0 (08/08 0724)        No results found for: URINECX  No results found for: BLOODCX    I have personally looked at the labs and they are summarized above.  ----------------------------------------------------------------------------------------------------------------------  Detailed radiology reports for the last 24 hours:  No radiology results for the last day  Assessment & Plan      Large sacral decubitus ulcer s/p debridement  Infected sacral decubitus ulcer 2/2  Bacteroides, E. coli, Klebsiella  History of ischemic stroke with chronic deficits including nonambulatory and nonverbal  Diabetes mellitus type 2  Essential hypertension  Hypothyroidism    Patient has completed course of Rocephin through 8/4/2022.  Currently completing a course of Flagyl for antibiotic associated diarrhea.  Factious disease consulted and following along as diarrhea is resolved per recommendations no indication for C. difficile testing at this time.  General surgery consulted and following along and planning for wound check tomorrow.    Copied text in this portion of the note has been reviewed and is accurate as of 08/08/22     VTE Prophylaxis:   Mechanical Order History:     None      Pharmalogical Order History:      Ordered     Dose Route Frequency Stop    07/30/22 0946  heparin (porcine) 5000 UNIT/ML injection 5,000 Units         5,000 Units SC Every 12 Hours Scheduled --                Disposition Home with family    Wally Norman DO  [facility] Hospitalist  08/08/22  19:47 EDT

## 2022-08-08 NOTE — PLAN OF CARE
Goal Outcome Evaluation:           Progress: no change  Outcome Evaluation: Patient has rested in bed today.  Wound care completed. Tube feeding continues, tolerating well.  Continue current plan of care.

## 2022-08-09 LAB
ANION GAP SERPL CALCULATED.3IONS-SCNC: 8.9 MMOL/L (ref 5–15)
BASOPHILS # BLD AUTO: 0.02 10*3/MM3 (ref 0–0.2)
BASOPHILS NFR BLD AUTO: 0.3 % (ref 0–1.5)
BUN SERPL-MCNC: 15 MG/DL (ref 8–23)
BUN/CREAT SERPL: 33.3 (ref 7–25)
CALCIUM SPEC-SCNC: 8.1 MG/DL (ref 8.6–10.5)
CHLORIDE SERPL-SCNC: 102 MMOL/L (ref 98–107)
CO2 SERPL-SCNC: 23.1 MMOL/L (ref 22–29)
CREAT SERPL-MCNC: 0.45 MG/DL (ref 0.57–1)
DEPRECATED RDW RBC AUTO: 60.4 FL (ref 37–54)
EGFRCR SERPLBLD CKD-EPI 2021: 103.6 ML/MIN/1.73
EOSINOPHIL # BLD AUTO: 0.04 10*3/MM3 (ref 0–0.4)
EOSINOPHIL NFR BLD AUTO: 0.5 % (ref 0.3–6.2)
ERYTHROCYTE [DISTWIDTH] IN BLOOD BY AUTOMATED COUNT: 16.9 % (ref 12.3–15.4)
GLUCOSE BLDC GLUCOMTR-MCNC: 197 MG/DL (ref 70–130)
GLUCOSE BLDC GLUCOMTR-MCNC: 230 MG/DL (ref 70–130)
GLUCOSE BLDC GLUCOMTR-MCNC: 241 MG/DL (ref 70–130)
GLUCOSE BLDC GLUCOMTR-MCNC: 273 MG/DL (ref 70–130)
GLUCOSE SERPL-MCNC: 311 MG/DL (ref 65–99)
HCT VFR BLD AUTO: 30.2 % (ref 34–46.6)
HGB BLD-MCNC: 9.4 G/DL (ref 12–15.9)
IMM GRANULOCYTES # BLD AUTO: 0.02 10*3/MM3 (ref 0–0.05)
IMM GRANULOCYTES NFR BLD AUTO: 0.3 % (ref 0–0.5)
LYMPHOCYTES # BLD AUTO: 2.45 10*3/MM3 (ref 0.7–3.1)
LYMPHOCYTES NFR BLD AUTO: 31.7 % (ref 19.6–45.3)
MCH RBC QN AUTO: 30.9 PG (ref 26.6–33)
MCHC RBC AUTO-ENTMCNC: 31.1 G/DL (ref 31.5–35.7)
MCV RBC AUTO: 99.3 FL (ref 79–97)
MONOCYTES # BLD AUTO: 0.5 10*3/MM3 (ref 0.1–0.9)
MONOCYTES NFR BLD AUTO: 6.5 % (ref 5–12)
NEUTROPHILS NFR BLD AUTO: 4.7 10*3/MM3 (ref 1.7–7)
NEUTROPHILS NFR BLD AUTO: 60.7 % (ref 42.7–76)
NRBC BLD AUTO-RTO: 0 /100 WBC (ref 0–0.2)
PLATELET # BLD AUTO: 281 10*3/MM3 (ref 140–450)
PMV BLD AUTO: 11.3 FL (ref 6–12)
POTASSIUM SERPL-SCNC: 4.5 MMOL/L (ref 3.5–5.2)
RBC # BLD AUTO: 3.04 10*6/MM3 (ref 3.77–5.28)
SODIUM SERPL-SCNC: 134 MMOL/L (ref 136–145)
WBC NRBC COR # BLD: 7.73 10*3/MM3 (ref 3.4–10.8)

## 2022-08-09 PROCEDURE — 99307 SBSQ NF CARE SF MDM 10: CPT | Performed by: INTERNAL MEDICINE

## 2022-08-09 PROCEDURE — 63710000001 INSULIN ASPART PER 5 UNITS: Performed by: SURGERY

## 2022-08-09 PROCEDURE — 82962 GLUCOSE BLOOD TEST: CPT

## 2022-08-09 PROCEDURE — 63710000001 INSULIN DETEMIR PER 5 UNITS: Performed by: HOSPITALIST

## 2022-08-09 PROCEDURE — 80048 BASIC METABOLIC PNL TOTAL CA: CPT | Performed by: SURGERY

## 2022-08-09 PROCEDURE — 85025 COMPLETE CBC W/AUTO DIFF WBC: CPT | Performed by: SURGERY

## 2022-08-09 PROCEDURE — 25010000002 HEPARIN (PORCINE) PER 1000 UNITS: Performed by: SURGERY

## 2022-08-09 RX ADMIN — ATORVASTATIN CALCIUM 40 MG: 40 TABLET, FILM COATED ORAL at 20:14

## 2022-08-09 RX ADMIN — DONEPEZIL HYDROCHLORIDE 10 MG: 5 TABLET, FILM COATED ORAL at 20:14

## 2022-08-09 RX ADMIN — INSULIN ASPART 2 UNITS: 100 INJECTION, SOLUTION INTRAVENOUS; SUBCUTANEOUS at 17:47

## 2022-08-09 RX ADMIN — INSULIN DETEMIR 25 UNITS: 100 INJECTION, SOLUTION SUBCUTANEOUS at 20:16

## 2022-08-09 RX ADMIN — INSULIN ASPART 6 UNITS: 100 INJECTION, SOLUTION INTRAVENOUS; SUBCUTANEOUS at 10:29

## 2022-08-09 RX ADMIN — HYDROCODONE BITARTRATE AND ACETAMINOPHEN 10 ML: 7.5; 325 SOLUTION ORAL at 02:06

## 2022-08-09 RX ADMIN — Medication 10 ML: at 10:10

## 2022-08-09 RX ADMIN — HEPARIN SODIUM 5000 UNITS: 5000 INJECTION INTRAVENOUS; SUBCUTANEOUS at 20:14

## 2022-08-09 RX ADMIN — INSULIN ASPART 4 UNITS: 100 INJECTION, SOLUTION INTRAVENOUS; SUBCUTANEOUS at 12:08

## 2022-08-09 RX ADMIN — ISOSORBIDE MONONITRATE 30 MG: 30 TABLET, EXTENDED RELEASE ORAL at 09:58

## 2022-08-09 RX ADMIN — Medication 20 ML: at 20:14

## 2022-08-09 RX ADMIN — HEPARIN SODIUM 5000 UNITS: 5000 INJECTION INTRAVENOUS; SUBCUTANEOUS at 10:09

## 2022-08-09 RX ADMIN — LOSARTAN POTASSIUM 50 MG: 50 TABLET, FILM COATED ORAL at 09:58

## 2022-08-09 RX ADMIN — ASPIRIN 81 MG: 81 TABLET, CHEWABLE ORAL at 09:58

## 2022-08-09 RX ADMIN — METRONIDAZOLE 500 MG: 500 INJECTION, SOLUTION INTRAVENOUS at 03:10

## 2022-08-09 RX ADMIN — LEVOTHYROXINE SODIUM 25 MCG: 25 TABLET ORAL at 06:05

## 2022-08-09 RX ADMIN — ROPINIROLE HYDROCHLORIDE 0.5 MG: 0.25 TABLET, FILM COATED ORAL at 20:13

## 2022-08-09 RX ADMIN — Medication 5000 UNITS: at 09:58

## 2022-08-09 NOTE — PLAN OF CARE
Goal Outcome Evaluation:  Plan of Care Reviewed With: patient           Outcome Evaluation: pt resting in bed, has tolerated tube feeding well, VSS, no acute changes

## 2022-08-09 NOTE — PLAN OF CARE
Goal Outcome Evaluation:              Outcome Evaluation: Patient in bed on assessment. VSS. Tube feeding continues at 55. Wound care completed per order. Will continue plan of care.

## 2022-08-09 NOTE — PROGRESS NOTES
PROGRESS NOTE         Patient Identification:  Name:  Yecenia Botello  Age:  71 y.o.  Sex:  female  :  1950  MRN:  [id number]  Visit Number:  [id number]  Primary Care Provider:  Johnathan Ashraf MD         LOS: 10 days       ----------------------------------------------------------------------------------------------------------------------  Subjective       Chief Complaints:    No chief complaint on file.        Interval History:      Patient resting comfortably in bed this morning.  No issues reported overnight.  Afebrile, no diarrhea.  Currently on room air with no apparent distress.  WBC normal.  Antibiotic course completed.    Review of Systems:    Unable to obtain.  Patient is nonverbal.  ----------------------------------------------------------------------------------------------------------------------      Objective       Current Hospital Meds:  aspirin, 81 mg, Per PEG Tube, Daily  atorvastatin, 40 mg, Per PEG Tube, Nightly  Dakins (full strength), 20 mL, Topical, Q12H  donepezil, 10 mg, Per PEG Tube, Nightly  heparin (porcine), 5,000 Units, Subcutaneous, Q12H  Insulin Aspart, 0-9 Units, Subcutaneous, TID AC  insulin detemir, 25 Units, Subcutaneous, Nightly  isosorbide mononitrate, 30 mg, Oral, Q24H  levothyroxine, 25 mcg, Per PEG Tube, QAM  losartan, 50 mg, Per PEG Tube, Q24H  metroNIDAZOLE, 500 mg, Intravenous, Q8H  rOPINIRole, 0.5 mg, Per PEG Tube, Nightly  sodium chloride, 10 mL, Intravenous, Q12H  [START ON 2022] tuberculin, 5 Units, Intradermal, Once  vitamin D3, 5,000 Units, Per PEG Tube, Daily         ----------------------------------------------------------------------------------------------------------------------    Vital Signs:  Temp:  [98.2 °F (36.8 °C)-98.4 °F (36.9 °C)] 98.4 °F (36.9 °C)  Heart Rate:  [77-88] 77  Resp:  [16] 16  BP: (120-130)/(57-89) 121/57  No data found.  SpO2 Percentage    22 0617 22 1900 22 0618   SpO2: 97% 97% 98%     SpO2:   [97 %-98 %] 98 %  on   ;   Device (Oxygen Therapy): room air    Body mass index is 22.9 kg/m².  Wt Readings from Last 3 Encounters:   08/09/22 60.6 kg (133 lb 8 oz)   07/30/22 61.5 kg (135 lb 8 oz)   02/26/15 95.7 kg (211 lb)        Intake/Output Summary (Last 24 hours) at 8/9/2022 0951  Last data filed at 8/9/2022 0619  Gross per 24 hour   Intake 1868.2 ml   Output 1100 ml   Net 768.2 ml     NPO Diet NPO Type: Strict NPO  ----------------------------------------------------------------------------------------------------------------------      Physical Exam:       Constitutional: Elderly, chronically ill-appearing female resting comfortably in bed in no apparent distress.   HENT:  Head: Normocephalic and atraumatic.  Mouth:  Moist mucous membranes.    Eyes:  Conjunctivae and EOM are normal.  No scleral icterus.  Neck:  Neck supple.  No JVD present.    Cardiovascular:  Normal rate, regular rhythm. No edema. 3/6 systolic murmur.  Pulmonary/Chest:  No respiratory distress, no wheezes, no crackles, with normal breath sounds and good air movement.  Abdominal: Abdomen is soft and nondistended with normal bowel sounds. Nontender, as there is no grimacing or guarding.  Musculoskeletal:  No edema, no tenderness, and no deformity.  No swelling or redness of joints.  Neurological: Awake and alert.  Nonverbal.  No facial droop.   Skin:  Skin is warm and dry.  No rash noted.  No pallor.  Stage IV decubitus ulcer to sacrum, dressing in place.  Psychiatric:  Calm and cooperative.  Behavior is normal.    ----------------------------------------------------------------------------------------------------------------------            Results from last 7 days   Lab Units 08/09/22  0453 08/08/22  0724 08/07/22  0132   WBC 10*3/mm3 7.73 7.64 8.03   HEMOGLOBIN g/dL 9.4* 8.0* 8.8*   HEMATOCRIT % 30.2* 25.1* 28.1*   MCV fL 99.3* 96.5 97.6*   MCHC g/dL 31.1* 31.9 31.3*   PLATELETS 10*3/mm3 281 294 307     Results from last 7 days   Lab  Units 08/09/22  0453 08/08/22  0724 08/07/22  0132   SODIUM mmol/L 134* 133* 135*   POTASSIUM mmol/L 4.5 4.3 4.3   CHLORIDE mmol/L 102 103 101   CO2 mmol/L 23.1 23.0 23.1   BUN mg/dL 15 17 15   CREATININE mg/dL 0.45* 0.39* 0.43*   CALCIUM mg/dL 8.1* 8.0* 8.1*   GLUCOSE mg/dL 311* 152* 221*   ALBUMIN g/dL  --  1.88*  --    BILIRUBIN mg/dL  --  0.2  --    ALK PHOS U/L  --  47  --    AST (SGOT) U/L  --  22  --    ALT (SGPT) U/L  --  11  --    Estimated Creatinine Clearance: 111.3 mL/min (A) (by C-G formula based on SCr of 0.45 mg/dL (L)).  No results found for: AMMONIA    Glucose   Date/Time Value Ref Range Status   08/09/2022 0744 273 (H) 70 - 130 mg/dL Final     Comment:     Meter: [id number] : [id number] Mayo Sarmiento   08/08/2022 2213 115 70 - 130 mg/dL Final     Comment:     Meter: [id number] : [id number] ONIEL POTTER   08/08/2022 1619 151 (H) 70 - 130 mg/dL Final     Comment:     Meter: [id number] : [id number] Bright Sharlene   08/08/2022 1024 132 (H) 70 - 130 mg/dL Final     Comment:     Meter: [id number] : [id number] Bright Sharlene   08/08/2022 0620 169 (H) 70 - 130 mg/dL Final     Comment:     Meter: [id number] : [id number] LONGORIA MITCHELL   08/07/2022 2044 189 (H) 70 - 130 mg/dL Final     Comment:     Meter: [id number] : [id number] OBDULIA FUSION   08/07/2022 1633 184 (H) 70 - 130 mg/dL Final     Comment:     Meter: [id number] : [id number] Bright Sharlene   08/07/2022 1045 146 (H) 70 - 130 mg/dL Final     Comment:     Meter: [id number] : [id number] Raleigh Su     Lab Results   Component Value Date    HGBA1C 7.20 (H) 07/22/2022     Lab Results   Component Value Date    TSH 3.130 02/26/2015    FREET4 0.78 (L) 02/26/2015       Blood Culture   Date Value Ref Range Status   07/23/2022 No growth at 24 hours  Preliminary   07/23/2022 No growth at 24 hours  Preliminary     No results found for: URINECX  Wound Culture   Date Value Ref Range Status   07/23/2022 Rare Gram Negative  Bacilli (A)  Preliminary     No results found for: STOOLCX  No results found for: RESPCX  Pain Management Panel    There is no flowsheet data to display.           ----------------------------------------------------------------------------------------------------------------------  Imaging Results (Last 24 Hours)     ** No results found for the last 24 hours. **          Pertinent Infectious Disease Results    Urinalysis from 7/23/2022 positive with culture finalized as greater than 100,000 colonies of E. coli and greater than 100,000 colonies of Klebsiella pneumoniae. Wound culture of the sacrum from 7/23/2022 finalized as pan susceptible E. coli.   Blood cultures on 7/23/2022 finalized as no growth.  COVID-19 influenza PCR negative.  CT of the lumbar spine, abdomen and pelvis reports sacral decubitus ulcer with fluid and gas collection noted in the right lower buttock and soft tissue gas and soft tissue changes noted to the left of the midline overlying the sacrum as well, these abnormalities are in contact with the distal coccyx and ostium myelitis is not excluded, large colonic stool burden, degenerative disc disease lumbar spine. MRI of the pelvis from 7/25/2022 reports large right paramedian sacral decubitus ulceration with associated soft tissue edema noted but no well-defined fluid collection, no MRI signal characteristics of the bony structures of the pelvis indicate changes of osteomyelitis, diffuse soft tissue edema likely cellulitis, trace ascites in the lower pelvis. 2D echo on 7/29/2022 made no mention of vegetations.  Anaerobic culture of sacrum wound on 7/23/2022 finalized as Bacteroides fragilis. Status post debridement of sacral wound with partial secondary closure on 8/4/2022.       ----------------------------------------------------------------------------------------------------------------------    Assessment/Plan     1.  Infected sacral decubitus ulcer    I saw and examined the patient with  GOLDIE Bey this morning, patient is doing great from infectious disease standpoint with no fever or diarrhea reported overnight and good healing progress of her wound.  Antibiotic course completed with no evidence of relapsing sepsis with normal WBC.    Status post debridement of sacral wound with partial closure on 8/4/2022.    With culture showing growth of Bacteroides fragilis, patient completed a course of Flagyl 500 mg IV every 8 hours on 8/8/22.    2.  Severe sepsis with lactic acid greater than 2 on admission.    At this time patient seems to be stable with no evidence suggestive of clinical sepsis or hemodynamic instability we will continue to follow closely.  No fever reported overnight and leukocytosis resolved.    3.  New onset diarrhea.    Diarrhea resolved after the addition of Flagyl and will continue to follow closely as patient is at high risk for C. difficile colitis and would check C. difficile toxin PCR if persistent diarrhea.  NO diarrhea reported today.      Infectious disease team will sign off at this time as patient finished her antibiotic course with no evidence of relapsing sepsis and please call back for any questions.  Thank you.      Code Status:   Code Status and Medical Interventions:   Ordered at: 07/30/22 0946     Medical Intervention Limits:    NO intubation (DNI)     Level Of Support Discussed With:    Next of Kin (If No Surrogate)     Code Status (Patient has no pulse and is not breathing):    No CPR (Do Not Attempt to Resuscitate)     Medical Interventions (Patient has pulse or is breathing):    Limited Support     Comments:    pts daughter Yecenia       GOLDIE Bey  08/09/22  09:51 EDT     Electronically signed by GOLDIE Bey, 08/09/22, 9:51 AM EDT.  Electronically signed by Tye Long MD, 08/09/22, 10:36 AM EDT.

## 2022-08-09 NOTE — PROGRESS NOTES
Patient chart, vitals, nursing notes reviewed.  Patient not seen at bedside.  Patient currently remains in swing bed for continued wound care.  She has completed a course of antibiotics for her Bacteroides villous and completed Flagyl on 8/8/2022 and has had resolution of her previous diarrhea.  Surgery planning to see and evaluate wound and follow-up further wound care abdominal fold.  Patient approved for swing bed through 8/11/2022.

## 2022-08-10 LAB
ANION GAP SERPL CALCULATED.3IONS-SCNC: 8 MMOL/L (ref 5–15)
BASOPHILS # BLD AUTO: 0.03 10*3/MM3 (ref 0–0.2)
BASOPHILS NFR BLD AUTO: 0.5 % (ref 0–1.5)
BUN SERPL-MCNC: 14 MG/DL (ref 8–23)
BUN/CREAT SERPL: 33.3 (ref 7–25)
CALCIUM SPEC-SCNC: 8 MG/DL (ref 8.6–10.5)
CHLORIDE SERPL-SCNC: 103 MMOL/L (ref 98–107)
CO2 SERPL-SCNC: 21 MMOL/L (ref 22–29)
CREAT SERPL-MCNC: 0.42 MG/DL (ref 0.57–1)
DEPRECATED RDW RBC AUTO: 62.7 FL (ref 37–54)
EGFRCR SERPLBLD CKD-EPI 2021: 105.4 ML/MIN/1.73
EOSINOPHIL # BLD AUTO: 0.09 10*3/MM3 (ref 0–0.4)
EOSINOPHIL NFR BLD AUTO: 1.5 % (ref 0.3–6.2)
ERYTHROCYTE [DISTWIDTH] IN BLOOD BY AUTOMATED COUNT: 16.9 % (ref 12.3–15.4)
GLUCOSE BLDC GLUCOMTR-MCNC: 157 MG/DL (ref 70–130)
GLUCOSE BLDC GLUCOMTR-MCNC: 164 MG/DL (ref 70–130)
GLUCOSE BLDC GLUCOMTR-MCNC: 170 MG/DL (ref 70–130)
GLUCOSE BLDC GLUCOMTR-MCNC: 177 MG/DL (ref 70–130)
GLUCOSE SERPL-MCNC: 263 MG/DL (ref 65–99)
HCT VFR BLD AUTO: 28.5 % (ref 34–46.6)
HGB BLD-MCNC: 8.7 G/DL (ref 12–15.9)
IMM GRANULOCYTES # BLD AUTO: 0.02 10*3/MM3 (ref 0–0.05)
IMM GRANULOCYTES NFR BLD AUTO: 0.3 % (ref 0–0.5)
LYMPHOCYTES # BLD AUTO: 2.32 10*3/MM3 (ref 0.7–3.1)
LYMPHOCYTES NFR BLD AUTO: 37.6 % (ref 19.6–45.3)
MCH RBC QN AUTO: 30.9 PG (ref 26.6–33)
MCHC RBC AUTO-ENTMCNC: 30.5 G/DL (ref 31.5–35.7)
MCV RBC AUTO: 101.1 FL (ref 79–97)
MONOCYTES # BLD AUTO: 0.46 10*3/MM3 (ref 0.1–0.9)
MONOCYTES NFR BLD AUTO: 7.5 % (ref 5–12)
NEUTROPHILS NFR BLD AUTO: 3.25 10*3/MM3 (ref 1.7–7)
NEUTROPHILS NFR BLD AUTO: 52.6 % (ref 42.7–76)
NRBC BLD AUTO-RTO: 0 /100 WBC (ref 0–0.2)
PLATELET # BLD AUTO: 257 10*3/MM3 (ref 140–450)
PMV BLD AUTO: 11.4 FL (ref 6–12)
POTASSIUM SERPL-SCNC: 4.4 MMOL/L (ref 3.5–5.2)
RBC # BLD AUTO: 2.82 10*6/MM3 (ref 3.77–5.28)
SODIUM SERPL-SCNC: 132 MMOL/L (ref 136–145)
WBC NRBC COR # BLD: 6.17 10*3/MM3 (ref 3.4–10.8)

## 2022-08-10 PROCEDURE — 63710000001 INSULIN DETEMIR PER 5 UNITS: Performed by: HOSPITALIST

## 2022-08-10 PROCEDURE — 82962 GLUCOSE BLOOD TEST: CPT

## 2022-08-10 PROCEDURE — 63710000001 INSULIN ASPART PER 5 UNITS: Performed by: SURGERY

## 2022-08-10 PROCEDURE — 80048 BASIC METABOLIC PNL TOTAL CA: CPT | Performed by: SURGERY

## 2022-08-10 PROCEDURE — 85025 COMPLETE CBC W/AUTO DIFF WBC: CPT | Performed by: SURGERY

## 2022-08-10 PROCEDURE — 25010000002 HEPARIN (PORCINE) PER 1000 UNITS: Performed by: SURGERY

## 2022-08-10 RX ADMIN — Medication 20 ML: at 20:54

## 2022-08-10 RX ADMIN — INSULIN DETEMIR 25 UNITS: 100 INJECTION, SOLUTION SUBCUTANEOUS at 20:47

## 2022-08-10 RX ADMIN — INSULIN ASPART 2 UNITS: 100 INJECTION, SOLUTION INTRAVENOUS; SUBCUTANEOUS at 08:37

## 2022-08-10 RX ADMIN — ATORVASTATIN CALCIUM 40 MG: 40 TABLET, FILM COATED ORAL at 21:56

## 2022-08-10 RX ADMIN — INSULIN ASPART 2 UNITS: 100 INJECTION, SOLUTION INTRAVENOUS; SUBCUTANEOUS at 11:58

## 2022-08-10 RX ADMIN — INSULIN ASPART 2 UNITS: 100 INJECTION, SOLUTION INTRAVENOUS; SUBCUTANEOUS at 17:28

## 2022-08-10 RX ADMIN — HEPARIN SODIUM 5000 UNITS: 5000 INJECTION INTRAVENOUS; SUBCUTANEOUS at 20:40

## 2022-08-10 RX ADMIN — LOSARTAN POTASSIUM 50 MG: 50 TABLET, FILM COATED ORAL at 08:23

## 2022-08-10 RX ADMIN — ROPINIROLE HYDROCHLORIDE 0.5 MG: 0.25 TABLET, FILM COATED ORAL at 20:38

## 2022-08-10 RX ADMIN — Medication 10 ML: at 20:49

## 2022-08-10 RX ADMIN — Medication 10 ML: at 08:38

## 2022-08-10 RX ADMIN — ASPIRIN 81 MG: 81 TABLET, CHEWABLE ORAL at 08:23

## 2022-08-10 RX ADMIN — HEPARIN SODIUM 5000 UNITS: 5000 INJECTION INTRAVENOUS; SUBCUTANEOUS at 08:22

## 2022-08-10 RX ADMIN — ISOSORBIDE MONONITRATE 30 MG: 30 TABLET, EXTENDED RELEASE ORAL at 08:23

## 2022-08-10 RX ADMIN — LEVOTHYROXINE SODIUM 25 MCG: 25 TABLET ORAL at 04:38

## 2022-08-10 RX ADMIN — HYDROCODONE BITARTRATE AND ACETAMINOPHEN 10 ML: 7.5; 325 SOLUTION ORAL at 04:37

## 2022-08-10 RX ADMIN — DONEPEZIL HYDROCHLORIDE 10 MG: 5 TABLET, FILM COATED ORAL at 20:39

## 2022-08-10 RX ADMIN — HYDROCODONE BITARTRATE AND ACETAMINOPHEN 10 ML: 7.5; 325 SOLUTION ORAL at 20:48

## 2022-08-10 RX ADMIN — Medication 5000 UNITS: at 08:23

## 2022-08-10 RX ADMIN — Medication 20 ML: at 18:09

## 2022-08-10 NOTE — PLAN OF CARE
Problem: Skin Injury Risk Increased  Goal: Skin Health and Integrity  Outcome: Ongoing, Progressing  Intervention: Optimize Skin Protection  Recent Flowsheet Documentation  Taken 8/9/2022 2014 by Naima Hicks RN  Pressure Reduction Techniques: frequent weight shift encouraged  Pressure Reduction Devices: pressure-redistributing mattress utilized  Skin Protection: adhesive use limited     Problem: Fall Injury Risk  Goal: Absence of Fall and Fall-Related Injury  Outcome: Ongoing, Progressing  Intervention: Promote Injury-Free Environment  Recent Flowsheet Documentation  Taken 8/10/2022 0100 by Naima Hicks RN  Safety Promotion/Fall Prevention: safety round/check completed  Taken 8/9/2022 2300 by Naima Hicks RN  Safety Promotion/Fall Prevention: safety round/check completed  Taken 8/9/2022 2100 by Naima Hicks RN  Safety Promotion/Fall Prevention: safety round/check completed  Taken 8/9/2022 2014 by Namia Hicks RN  Safety Promotion/Fall Prevention: safety round/check completed  Taken 8/9/2022 1900 by Naima Hicks RN  Safety Promotion/Fall Prevention: safety round/check completed   Goal Outcome Evaluation:  Plan of Care Reviewed With: patient        Progress: no change  Outcome Evaluation: Patient resting in bed, no acute changes.

## 2022-08-10 NOTE — CASE MANAGEMENT/SOCIAL WORK
Discharge Planning Assessment  DINA Nuñez     Patient Name: Yecenia Botello  MRN: [id number]  Today's Date: 8/10/2022    Admit Date: 7/30/2022       Discharge Plan     Row Name 08/10/22 1647       Plan    Plan Pt was admitted to swing bed on 7/30/22 (approved through 8/11/22). Pt lives with her daughter, Yecenia. SS attempted contact with daughter, Yecenia and left a message. Pt did not utilize home health services prior to admission. Pt has utilized [facility] in the past. Pt has a tube feedding and supplies, wheelchair, reinaldo lift, and a hospital bed via [facility]. EMS to be arranged at discharge. SS to follow.                   Selected Continued Care - Prior Encounters Includes selections from prior encounters from 5/1/2022 to 8/10/2022    Discharged on 7/30/2022 Admission date: 7/22/2022 - Discharge disposition: Skilled Nursing Facility ([facility])    Destination     Service Provider Selected Services Address Phone Fax Patient Preferred     [facility] SW[facility] B[facility] [place] [address] [phone] -- --                    CHANTELLE Shukla

## 2022-08-10 NOTE — PROGRESS NOTES
Patient chart, vitals, nursing notes reviewed.  Nursing denies any issues or concerns today.  Patient not seen at bedside.  Patient currently remains in swing bed for continued wound care given the severity of her wounds that are beyond the level appropriate for home health wound care and nursing as well as skilled nursing facility placement.  Unfortunately insurance denied patient for long-term acute care hospitalization for the this wound care as she would have an appropriate candidate.  She has completed a course of antibiotics for her Bacteroides and completed Flagyl on 8/8/2022 and has had resolution of her previous diarrhea.  Patient approved for swing bed through 8/11/2022.

## 2022-08-10 NOTE — PLAN OF CARE
Goal Outcome Evaluation:  Plan of Care Reviewed With: patient           Outcome Evaluation: pt has rested in bed today, VSS, no acute changes noted

## 2022-08-11 LAB
ANION GAP SERPL CALCULATED.3IONS-SCNC: 7 MMOL/L (ref 5–15)
BASOPHILS # BLD AUTO: 0.03 10*3/MM3 (ref 0–0.2)
BASOPHILS NFR BLD AUTO: 0.5 % (ref 0–1.5)
BUN SERPL-MCNC: 14 MG/DL (ref 8–23)
BUN/CREAT SERPL: 37.8 (ref 7–25)
CALCIUM SPEC-SCNC: 8.2 MG/DL (ref 8.6–10.5)
CHLORIDE SERPL-SCNC: 104 MMOL/L (ref 98–107)
CO2 SERPL-SCNC: 23 MMOL/L (ref 22–29)
CREAT SERPL-MCNC: 0.37 MG/DL (ref 0.57–1)
DEPRECATED RDW RBC AUTO: 61.8 FL (ref 37–54)
EGFRCR SERPLBLD CKD-EPI 2021: 108.6 ML/MIN/1.73
EOSINOPHIL # BLD AUTO: 0.2 10*3/MM3 (ref 0–0.4)
EOSINOPHIL NFR BLD AUTO: 3 % (ref 0.3–6.2)
ERYTHROCYTE [DISTWIDTH] IN BLOOD BY AUTOMATED COUNT: 17.3 % (ref 12.3–15.4)
GLUCOSE BLDC GLUCOMTR-MCNC: 157 MG/DL (ref 70–130)
GLUCOSE BLDC GLUCOMTR-MCNC: 226 MG/DL (ref 70–130)
GLUCOSE BLDC GLUCOMTR-MCNC: 253 MG/DL (ref 70–130)
GLUCOSE BLDC GLUCOMTR-MCNC: 88 MG/DL (ref 70–130)
GLUCOSE SERPL-MCNC: 98 MG/DL (ref 65–99)
HCT VFR BLD AUTO: 26.2 % (ref 34–46.6)
HGB BLD-MCNC: 8 G/DL (ref 12–15.9)
IMM GRANULOCYTES # BLD AUTO: 0.03 10*3/MM3 (ref 0–0.05)
IMM GRANULOCYTES NFR BLD AUTO: 0.5 % (ref 0–0.5)
LYMPHOCYTES # BLD AUTO: 2.51 10*3/MM3 (ref 0.7–3.1)
LYMPHOCYTES NFR BLD AUTO: 37.7 % (ref 19.6–45.3)
MCH RBC QN AUTO: 30.3 PG (ref 26.6–33)
MCHC RBC AUTO-ENTMCNC: 30.5 G/DL (ref 31.5–35.7)
MCV RBC AUTO: 99.2 FL (ref 79–97)
MONOCYTES # BLD AUTO: 0.47 10*3/MM3 (ref 0.1–0.9)
MONOCYTES NFR BLD AUTO: 7.1 % (ref 5–12)
NEUTROPHILS NFR BLD AUTO: 3.41 10*3/MM3 (ref 1.7–7)
NEUTROPHILS NFR BLD AUTO: 51.2 % (ref 42.7–76)
NRBC BLD AUTO-RTO: 0 /100 WBC (ref 0–0.2)
PLATELET # BLD AUTO: 290 10*3/MM3 (ref 140–450)
PMV BLD AUTO: 11.3 FL (ref 6–12)
POTASSIUM SERPL-SCNC: 4.4 MMOL/L (ref 3.5–5.2)
RBC # BLD AUTO: 2.64 10*6/MM3 (ref 3.77–5.28)
SODIUM SERPL-SCNC: 134 MMOL/L (ref 136–145)
WBC NRBC COR # BLD: 6.65 10*3/MM3 (ref 3.4–10.8)

## 2022-08-11 PROCEDURE — 63710000001 INSULIN ASPART PER 5 UNITS: Performed by: SURGERY

## 2022-08-11 PROCEDURE — 25010000002 HEPARIN (PORCINE) PER 1000 UNITS: Performed by: SURGERY

## 2022-08-11 PROCEDURE — 63710000001 INSULIN DETEMIR PER 5 UNITS: Performed by: HOSPITALIST

## 2022-08-11 PROCEDURE — 80048 BASIC METABOLIC PNL TOTAL CA: CPT | Performed by: SURGERY

## 2022-08-11 PROCEDURE — 82962 GLUCOSE BLOOD TEST: CPT

## 2022-08-11 PROCEDURE — 85025 COMPLETE CBC W/AUTO DIFF WBC: CPT | Performed by: SURGERY

## 2022-08-11 RX ADMIN — ATORVASTATIN CALCIUM 40 MG: 40 TABLET, FILM COATED ORAL at 21:01

## 2022-08-11 RX ADMIN — LEVOTHYROXINE SODIUM 25 MCG: 25 TABLET ORAL at 05:53

## 2022-08-11 RX ADMIN — Medication 5000 UNITS: at 08:52

## 2022-08-11 RX ADMIN — INSULIN ASPART 4 UNITS: 100 INJECTION, SOLUTION INTRAVENOUS; SUBCUTANEOUS at 17:15

## 2022-08-11 RX ADMIN — Medication 10 ML: at 08:52

## 2022-08-11 RX ADMIN — ASPIRIN 81 MG: 81 TABLET, CHEWABLE ORAL at 08:51

## 2022-08-11 RX ADMIN — INSULIN ASPART 2 UNITS: 100 INJECTION, SOLUTION INTRAVENOUS; SUBCUTANEOUS at 12:03

## 2022-08-11 RX ADMIN — Medication 20 ML: at 21:01

## 2022-08-11 RX ADMIN — Medication 20 ML: at 08:50

## 2022-08-11 RX ADMIN — INSULIN DETEMIR 25 UNITS: 100 INJECTION, SOLUTION SUBCUTANEOUS at 21:01

## 2022-08-11 RX ADMIN — ROPINIROLE HYDROCHLORIDE 0.5 MG: 0.25 TABLET, FILM COATED ORAL at 21:01

## 2022-08-11 RX ADMIN — Medication 10 ML: at 21:01

## 2022-08-11 RX ADMIN — HEPARIN SODIUM 5000 UNITS: 5000 INJECTION INTRAVENOUS; SUBCUTANEOUS at 08:51

## 2022-08-11 RX ADMIN — LOSARTAN POTASSIUM 50 MG: 50 TABLET, FILM COATED ORAL at 08:52

## 2022-08-11 RX ADMIN — HEPARIN SODIUM 5000 UNITS: 5000 INJECTION INTRAVENOUS; SUBCUTANEOUS at 21:01

## 2022-08-11 RX ADMIN — ISOSORBIDE MONONITRATE 30 MG: 30 TABLET, EXTENDED RELEASE ORAL at 08:52

## 2022-08-11 RX ADMIN — DONEPEZIL HYDROCHLORIDE 10 MG: 5 TABLET, FILM COATED ORAL at 21:01

## 2022-08-11 NOTE — PLAN OF CARE
Goal Outcome Evaluation:           Progress: improving  Outcome Evaluation: Resting quietly in bed.  No s/s distress noted.  Continue to monitor.

## 2022-08-11 NOTE — NURSING NOTE
Family at bedside upset that patient is not going home today. Family states they want to talk to the doctor in the morning about going home. Family requests numbers to be documented  Yecenia mcginnis: [phone]  miguel walsh [phone]  Javan [phone]

## 2022-08-11 NOTE — CASE MANAGEMENT/SOCIAL WORK
Discharge Planning Assessment  DINA [place]     Patient Name: Yecenia Botello  MRN: [id number]  Today's Date: 8/11/2022    Admit Date: 7/30/2022     Discharge Plan     Row Name 08/11/22 1625       Plan    Plan SS received a message from pt's daughter, Yecenia this morning. SS attempted contact with pt's daughterYecenia without success. SS to follow.              Continued Care and Services - Admitted Since 7/30/2022    Coordination has not been started for this encounter.     Selected Continued Care - Prior Encounters Includes selections from prior encounters from 5/1/2022 to 8/11/2022    Discharged on 7/30/2022 Admission date: 7/22/2022 - Discharge disposition: Skilled Nursing Facility ([facility])    Destination     Service Provider Selected Services Address Phone Fax Patient Preferred     TED SWING BED  Skilled Nursing 1 [facility] [place] [address] [phone] -- --                    CHANTELLE Shukla

## 2022-08-11 NOTE — PROGRESS NOTES
Patient chart, vitals, nursing notes reviewed.  Nursing denies any issues or concerns today.  Patient not seen at bedside.  Patient currently remains in swing bed for continued wound care given the severity of her wounds that are beyond the level appropriate for home health wound care and nursing as well as skilled nursing facility placement.  Unfortunately insurance denied patient for long-term acute care hospitalization for the this wound care as she would have an appropriate candidate as she will not be able to receive the wound care she would need at a personal residence.  She has completed a course of antibiotics for her Bacteroides and completed Flagyl on 8/8/2022 and has had resolution of her previous diarrhea.  Patient approved for swing bed through 8/11/2022 today.

## 2022-08-11 NOTE — PLAN OF CARE
Goal Outcome Evaluation:   No acute changes this shift. Pt resting in bed with eyes closed. Performed wound care as ordered. Minimal drainage was noted and no odor. Pt turned q2hr. Call light within reach and use has been encouraged, will continue with plan of care at this time.

## 2022-08-12 LAB
ANION GAP SERPL CALCULATED.3IONS-SCNC: 9.2 MMOL/L (ref 5–15)
BASOPHILS # BLD AUTO: 0.03 10*3/MM3 (ref 0–0.2)
BASOPHILS NFR BLD AUTO: 0.4 % (ref 0–1.5)
BUN SERPL-MCNC: 16 MG/DL (ref 8–23)
BUN/CREAT SERPL: 37.2 (ref 7–25)
CALCIUM SPEC-SCNC: 8.2 MG/DL (ref 8.6–10.5)
CHLORIDE SERPL-SCNC: 102 MMOL/L (ref 98–107)
CO2 SERPL-SCNC: 23.8 MMOL/L (ref 22–29)
CREAT SERPL-MCNC: 0.43 MG/DL (ref 0.57–1)
DEPRECATED RDW RBC AUTO: 60.2 FL (ref 37–54)
EGFRCR SERPLBLD CKD-EPI 2021: 104.8 ML/MIN/1.73
EOSINOPHIL # BLD AUTO: 0.14 10*3/MM3 (ref 0–0.4)
EOSINOPHIL NFR BLD AUTO: 2 % (ref 0.3–6.2)
ERYTHROCYTE [DISTWIDTH] IN BLOOD BY AUTOMATED COUNT: 17 % (ref 12.3–15.4)
GLUCOSE BLDC GLUCOMTR-MCNC: 149 MG/DL (ref 70–130)
GLUCOSE BLDC GLUCOMTR-MCNC: 165 MG/DL (ref 70–130)
GLUCOSE BLDC GLUCOMTR-MCNC: 225 MG/DL (ref 70–130)
GLUCOSE BLDC GLUCOMTR-MCNC: 228 MG/DL (ref 70–130)
GLUCOSE SERPL-MCNC: 238 MG/DL (ref 65–99)
HCT VFR BLD AUTO: 25.5 % (ref 34–46.6)
HGB BLD-MCNC: 8.1 G/DL (ref 12–15.9)
IMM GRANULOCYTES # BLD AUTO: 0.04 10*3/MM3 (ref 0–0.05)
IMM GRANULOCYTES NFR BLD AUTO: 0.6 % (ref 0–0.5)
LYMPHOCYTES # BLD AUTO: 1.97 10*3/MM3 (ref 0.7–3.1)
LYMPHOCYTES NFR BLD AUTO: 28.6 % (ref 19.6–45.3)
MCH RBC QN AUTO: 30.9 PG (ref 26.6–33)
MCHC RBC AUTO-ENTMCNC: 31.8 G/DL (ref 31.5–35.7)
MCV RBC AUTO: 97.3 FL (ref 79–97)
MONOCYTES # BLD AUTO: 0.45 10*3/MM3 (ref 0.1–0.9)
MONOCYTES NFR BLD AUTO: 6.5 % (ref 5–12)
NEUTROPHILS NFR BLD AUTO: 4.27 10*3/MM3 (ref 1.7–7)
NEUTROPHILS NFR BLD AUTO: 61.9 % (ref 42.7–76)
NRBC BLD AUTO-RTO: 0 /100 WBC (ref 0–0.2)
PLATELET # BLD AUTO: 313 10*3/MM3 (ref 140–450)
PMV BLD AUTO: 11.1 FL (ref 6–12)
POTASSIUM SERPL-SCNC: 4.3 MMOL/L (ref 3.5–5.2)
RBC # BLD AUTO: 2.62 10*6/MM3 (ref 3.77–5.28)
SODIUM SERPL-SCNC: 135 MMOL/L (ref 136–145)
WBC NRBC COR # BLD: 6.9 10*3/MM3 (ref 3.4–10.8)

## 2022-08-12 PROCEDURE — 80048 BASIC METABOLIC PNL TOTAL CA: CPT | Performed by: SURGERY

## 2022-08-12 PROCEDURE — 63710000001 INSULIN ASPART PER 5 UNITS: Performed by: SURGERY

## 2022-08-12 PROCEDURE — 25010000002 HEPARIN (PORCINE) PER 1000 UNITS: Performed by: SURGERY

## 2022-08-12 PROCEDURE — 63710000001 INSULIN DETEMIR PER 5 UNITS: Performed by: HOSPITALIST

## 2022-08-12 PROCEDURE — 82962 GLUCOSE BLOOD TEST: CPT

## 2022-08-12 PROCEDURE — 85025 COMPLETE CBC W/AUTO DIFF WBC: CPT | Performed by: SURGERY

## 2022-08-12 RX ADMIN — INSULIN DETEMIR 25 UNITS: 100 INJECTION, SOLUTION SUBCUTANEOUS at 22:04

## 2022-08-12 RX ADMIN — HEPARIN SODIUM 5000 UNITS: 5000 INJECTION INTRAVENOUS; SUBCUTANEOUS at 21:54

## 2022-08-12 RX ADMIN — Medication 20 ML: at 21:54

## 2022-08-12 RX ADMIN — LEVOTHYROXINE SODIUM 25 MCG: 25 TABLET ORAL at 06:06

## 2022-08-12 RX ADMIN — HEPARIN SODIUM 5000 UNITS: 5000 INJECTION INTRAVENOUS; SUBCUTANEOUS at 07:25

## 2022-08-12 RX ADMIN — INSULIN ASPART 2 UNITS: 100 INJECTION, SOLUTION INTRAVENOUS; SUBCUTANEOUS at 16:08

## 2022-08-12 RX ADMIN — ASPIRIN 81 MG: 81 TABLET, CHEWABLE ORAL at 07:25

## 2022-08-12 RX ADMIN — Medication 10 ML: at 07:33

## 2022-08-12 RX ADMIN — HYDROCODONE BITARTRATE AND ACETAMINOPHEN 10 ML: 7.5; 325 SOLUTION ORAL at 00:01

## 2022-08-12 RX ADMIN — Medication 5000 UNITS: at 07:26

## 2022-08-12 RX ADMIN — Medication 10 ML: at 21:54

## 2022-08-12 RX ADMIN — ROPINIROLE HYDROCHLORIDE 0.5 MG: 0.25 TABLET, FILM COATED ORAL at 21:53

## 2022-08-12 RX ADMIN — DONEPEZIL HYDROCHLORIDE 10 MG: 5 TABLET, FILM COATED ORAL at 21:53

## 2022-08-12 RX ADMIN — ISOSORBIDE MONONITRATE 30 MG: 30 TABLET, EXTENDED RELEASE ORAL at 07:25

## 2022-08-12 RX ADMIN — LOSARTAN POTASSIUM 50 MG: 50 TABLET, FILM COATED ORAL at 07:25

## 2022-08-12 RX ADMIN — INSULIN ASPART 4 UNITS: 100 INJECTION, SOLUTION INTRAVENOUS; SUBCUTANEOUS at 07:24

## 2022-08-12 RX ADMIN — ATORVASTATIN CALCIUM 40 MG: 40 TABLET, FILM COATED ORAL at 21:54

## 2022-08-12 NOTE — CASE MANAGEMENT/SOCIAL WORK
Discharge Planning Assessment  DINA Nuñez     Patient Name: Yecenia Botello  MRN: [id number]  Today's Date: 8/12/2022    Admit Date: 7/30/2022     Discharge Plan     Row Name 08/12/22 1550       Plan    Plan Pt was admitted to swing bed on 7/30/22. Pt has been approved by insurance for swing bed through 8/17/22 per swing bed RN. Pt lives with her daughter, Yecenia and she plans for pt to return home at discharge. Pt did not utilize home health services prior to admission. Pt has a nutrition supplies, wheelchair, reinaldo lift, and hospital bed via [facility]. SS attempted contact with pt's daughter, Yecenia and left a message. SS to follow.                   Selected Continued Care - Prior Encounters Includes selections from prior encounters from 5/1/2022 to 8/12/2022    Discharged on 7/30/2022 Admission date: 7/22/2022 - Discharge disposition: Swing Bed    Destination     Service Provider Selected Services Address Phone Fax Patient Preferred     TED SWING BED  Skilled Nursing [address] [place] [address] [place] [id number] [phone] -- --                    CHANTELLE Shukla

## 2022-08-12 NOTE — PLAN OF CARE
Goal Outcome Evaluation:               Pt resting in bed. Has not verbally communicated this morning.She does make eye contact at times and follows as I moved through the room. Meds given per PEG. Will do wound care.

## 2022-08-12 NOTE — PROGRESS NOTES
Patient chart, vitals, nursing notes reviewed.  Patient not seen at bedside.  Patient currently remains in swing bed for continued wound care given the severity of her wounds that are beyond the level appropriate for home health wound care and nursing as well as skilled nursing facility placement.  Unfortunately insurance denied patient for long-term acute care hospitalization for the this wound care as she would have an appropriate candidate as she will not be able to receive the wound care she would need at a personal residence.  She has completed a course of antibiotics for her Bacteroides and completed Flagyl on 8/8/2022 and has had resolution of her previous diarrhea.  Patient approved for additional swing bed days through 8/17/2022.

## 2022-08-12 NOTE — CASE MANAGEMENT/SOCIAL WORK
Patient has been approved for 6 additional swing bed days through 8/17 with clinical updates due 8/16 reference # [id number]. Provider and SS notified via secure chat.

## 2022-08-13 ENCOUNTER — APPOINTMENT (OUTPATIENT)
Dept: GENERAL RADIOLOGY | Facility: HOSPITAL | Age: 72
End: 2022-08-13

## 2022-08-13 LAB
ANION GAP SERPL CALCULATED.3IONS-SCNC: 10.1 MMOL/L (ref 5–15)
BASOPHILS # BLD AUTO: 0.03 10*3/MM3 (ref 0–0.2)
BASOPHILS NFR BLD AUTO: 0.4 % (ref 0–1.5)
BUN SERPL-MCNC: 14 MG/DL (ref 8–23)
BUN/CREAT SERPL: 32.6 (ref 7–25)
CALCIUM SPEC-SCNC: 8.4 MG/DL (ref 8.6–10.5)
CHLORIDE SERPL-SCNC: 102 MMOL/L (ref 98–107)
CO2 SERPL-SCNC: 22.9 MMOL/L (ref 22–29)
CREAT SERPL-MCNC: 0.43 MG/DL (ref 0.57–1)
DEPRECATED RDW RBC AUTO: 61 FL (ref 37–54)
EGFRCR SERPLBLD CKD-EPI 2021: 104.8 ML/MIN/1.73
EOSINOPHIL # BLD AUTO: 0.19 10*3/MM3 (ref 0–0.4)
EOSINOPHIL NFR BLD AUTO: 2.5 % (ref 0.3–6.2)
ERYTHROCYTE [DISTWIDTH] IN BLOOD BY AUTOMATED COUNT: 17.2 % (ref 12.3–15.4)
GLUCOSE BLDC GLUCOMTR-MCNC: 147 MG/DL (ref 70–130)
GLUCOSE BLDC GLUCOMTR-MCNC: 175 MG/DL (ref 70–130)
GLUCOSE BLDC GLUCOMTR-MCNC: 176 MG/DL (ref 70–130)
GLUCOSE SERPL-MCNC: 164 MG/DL (ref 65–99)
HCT VFR BLD AUTO: 27.8 % (ref 34–46.6)
HGB BLD-MCNC: 8.7 G/DL (ref 12–15.9)
IMM GRANULOCYTES # BLD AUTO: 0.02 10*3/MM3 (ref 0–0.05)
IMM GRANULOCYTES NFR BLD AUTO: 0.3 % (ref 0–0.5)
LYMPHOCYTES # BLD AUTO: 2.38 10*3/MM3 (ref 0.7–3.1)
LYMPHOCYTES NFR BLD AUTO: 31.1 % (ref 19.6–45.3)
MCH RBC QN AUTO: 30.5 PG (ref 26.6–33)
MCHC RBC AUTO-ENTMCNC: 31.3 G/DL (ref 31.5–35.7)
MCV RBC AUTO: 97.5 FL (ref 79–97)
MONOCYTES # BLD AUTO: 0.62 10*3/MM3 (ref 0.1–0.9)
MONOCYTES NFR BLD AUTO: 8.1 % (ref 5–12)
NEUTROPHILS NFR BLD AUTO: 4.41 10*3/MM3 (ref 1.7–7)
NEUTROPHILS NFR BLD AUTO: 57.6 % (ref 42.7–76)
NRBC BLD AUTO-RTO: 0 /100 WBC (ref 0–0.2)
PLATELET # BLD AUTO: 280 10*3/MM3 (ref 140–450)
PMV BLD AUTO: 11.3 FL (ref 6–12)
POTASSIUM SERPL-SCNC: 4.4 MMOL/L (ref 3.5–5.2)
RBC # BLD AUTO: 2.85 10*6/MM3 (ref 3.77–5.28)
SODIUM SERPL-SCNC: 135 MMOL/L (ref 136–145)
WBC NRBC COR # BLD: 7.65 10*3/MM3 (ref 3.4–10.8)

## 2022-08-13 PROCEDURE — 80048 BASIC METABOLIC PNL TOTAL CA: CPT | Performed by: SURGERY

## 2022-08-13 PROCEDURE — 85025 COMPLETE CBC W/AUTO DIFF WBC: CPT | Performed by: SURGERY

## 2022-08-13 PROCEDURE — 71045 X-RAY EXAM CHEST 1 VIEW: CPT

## 2022-08-13 PROCEDURE — 63710000001 INSULIN ASPART PER 5 UNITS: Performed by: SURGERY

## 2022-08-13 PROCEDURE — 82962 GLUCOSE BLOOD TEST: CPT

## 2022-08-13 PROCEDURE — 63710000001 INSULIN DETEMIR PER 5 UNITS: Performed by: HOSPITALIST

## 2022-08-13 PROCEDURE — 25010000002 HEPARIN (PORCINE) PER 1000 UNITS: Performed by: SURGERY

## 2022-08-13 RX ADMIN — ATORVASTATIN CALCIUM 40 MG: 40 TABLET, FILM COATED ORAL at 20:55

## 2022-08-13 RX ADMIN — Medication 20 ML: at 21:35

## 2022-08-13 RX ADMIN — Medication 20 ML: at 08:45

## 2022-08-13 RX ADMIN — INSULIN DETEMIR 25 UNITS: 100 INJECTION, SOLUTION SUBCUTANEOUS at 20:55

## 2022-08-13 RX ADMIN — ROPINIROLE HYDROCHLORIDE 0.5 MG: 0.25 TABLET, FILM COATED ORAL at 20:55

## 2022-08-13 RX ADMIN — TUBERCULIN PURIFIED PROTEIN DERIVATIVE 5 UNITS: 5 INJECTION, SOLUTION INTRADERMAL at 06:46

## 2022-08-13 RX ADMIN — DONEPEZIL HYDROCHLORIDE 10 MG: 5 TABLET, FILM COATED ORAL at 20:55

## 2022-08-13 RX ADMIN — ISOSORBIDE MONONITRATE 30 MG: 30 TABLET, EXTENDED RELEASE ORAL at 08:43

## 2022-08-13 RX ADMIN — HEPARIN SODIUM 5000 UNITS: 5000 INJECTION INTRAVENOUS; SUBCUTANEOUS at 20:55

## 2022-08-13 RX ADMIN — HYDROCODONE BITARTRATE AND ACETAMINOPHEN 10 ML: 7.5; 325 SOLUTION ORAL at 04:05

## 2022-08-13 RX ADMIN — INSULIN ASPART 2 UNITS: 100 INJECTION, SOLUTION INTRAVENOUS; SUBCUTANEOUS at 17:53

## 2022-08-13 RX ADMIN — Medication 5000 UNITS: at 08:44

## 2022-08-13 RX ADMIN — ASPIRIN 81 MG: 81 TABLET, CHEWABLE ORAL at 08:43

## 2022-08-13 RX ADMIN — LOSARTAN POTASSIUM 50 MG: 50 TABLET, FILM COATED ORAL at 08:44

## 2022-08-13 RX ADMIN — HEPARIN SODIUM 5000 UNITS: 5000 INJECTION INTRAVENOUS; SUBCUTANEOUS at 08:42

## 2022-08-13 RX ADMIN — Medication 10 ML: at 08:44

## 2022-08-13 RX ADMIN — Medication 10 ML: at 20:55

## 2022-08-13 RX ADMIN — LEVOTHYROXINE SODIUM 25 MCG: 25 TABLET ORAL at 06:37

## 2022-08-13 NOTE — PROGRESS NOTES
Nutrition Services    Patient Name:  Yecenia Botello  YOB: 1950  MRN: [id number]  Admit Date:  7/30/2022    BMI: 23.37  Diet: Isosource 1.5 at 55 ml/hr continuous  Intake: meeting needs  Fluid: meeting needs     Electronically signed by:  Elías Sears RD  08/13/22 08:01 EDT

## 2022-08-13 NOTE — PLAN OF CARE
Goal Outcome Evaluation:              Outcome Evaluation: Pt resting in bed at this time. VSS. Nonverbal. PRN pain medication given prior to dressing change. Wound care completed per order. Will continue POC.

## 2022-08-13 NOTE — PROGRESS NOTES
LOS: 14 days   Patient Care Team:  Johnathan Ashraf MD as PCP - General (Family Medicine)    Surgery follow up for sacral decubitus    Subjective     Interval History:  No acute changes reported per nursing.  Wound care being done bid     History taken from nurse Sanabria.      Review of Systems:   Review of systems could not be obtained due to  patient nonverbal.    Objective     Vital Signs  Temp:  [98.3 °F (36.8 °C)-98.6 °F (37 °C)] 98.3 °F (36.8 °C)  Heart Rate:  [74-82] 82  Resp:  [16] 16  BP: (148-151)/(61-64) 148/64    Physical Exam:  Gluteal wounds were examined.  The more lateral wound is significantly smaller and requires less than 6 inches of packing.  The midline wound is still large but clean and with good granulation tissue.     Results Review:        Results from last 7 days   Lab Units 08/13/22  0533 08/12/22  0638 08/11/22  0740   WBC 10*3/mm3 7.65 6.90 6.65   HEMOGLOBIN g/dL 8.7* 8.1* 8.0*   HEMATOCRIT % 27.8* 25.5* 26.2*   PLATELETS 10*3/mm3 280 313 290         Results from last 7 days   Lab Units 08/13/22  0533 08/12/22  0638 08/11/22  0740 08/09/22  0453 08/08/22  0724   SODIUM mmol/L 135* 135* 134*   < > 133*   POTASSIUM mmol/L 4.4 4.3 4.4   < > 4.3   CHLORIDE mmol/L 102 102 104   < > 103   CO2 mmol/L 22.9 23.8 23.0   < > 23.0   BUN mg/dL 14 16 14   < > 17   CREATININE mg/dL 0.43* 0.43* 0.37*   < > 0.39*   CALCIUM mg/dL 8.4* 8.2* 8.2*   < > 8.0*   GLUCOSE mg/dL 164* 238* 98   < > 152*   ALBUMIN g/dL  --   --   --   --  1.88*   BILIRUBIN mg/dL  --   --   --   --  0.2   ALK PHOS U/L  --   --   --   --  47   AST (SGOT) U/L  --   --   --   --  22   ALT (SGPT) U/L  --   --   --   --  11    < > = values in this interval not displayed.   Estimated Creatinine Clearance: 118.8 mL/min (A) (by C-G formula based on SCr of 0.43 mg/dL (L)).  No results found for: AMMONIA      No results found for: BLOODCX  No results found for: URINECX  No results found for: WOUNDCX  No results found for:  STOOLCX    Imaging:  Imaging Results (Last 24 Hours)     Procedure Component Value Units Date/Time    XR Chest 1 View [[id number]] Collected: 08/13/22 0343     Updated: 08/13/22 0346    Narrative:      CR Chest 1 Vw    INDICATION:   Labored breathing     COMPARISON:    3/27/2015    FINDINGS:  Portable AP view(s) of the chest.        Heart size is normal. The aorta is tortuous and ectatic. The lungs are clear with normal vascular markings. No effusions are seen. No changes are noted since the previous examination.       Impression:      No acute cardiopulmonary findings.    Signer Name: Johnathan Herbert MD   Signed: 8/13/2022 3:43 AM   Work[facility]    [facility]        Impression:  Healing sacral decubitus.    Plan:  Will try to place some additional skin closure sutures to try to close the dead space and reduce healing time.    Daja Ivory MD  08/13/22  14:08 EDT      Please note that portions of this note were completed with a voice recognition program.

## 2022-08-13 NOTE — PROGRESS NOTES
Patient chart, vitals, nursing notes reviewed.  Patient overnight with reported accessory muscle use and sounding congestion that has since resolved.  Overnight CXR unremarkable and lungs clear.  Labs remain stable.  General surgery seen and planning to place additional sutures to help with healing time.  Patient currently remains in swing bed for continued wound care given the severity of her wounds that are beyond the level appropriate for home health wound care and nursing as well as skilled nursing facility placement.  Unfortunately insurance denied patient for long-term acute care hospitalization for the this wound care as she would have an appropriate candidate as she will not be able to receive the wound care she would need at a personal residence.  She has completed a course of antibiotics for her Bacteroides and completed Flagyl on 8/8/2022 and has had resolution of her previous diarrhea.  Patient approved for additional swing bed days through 8/17/2022.

## 2022-08-13 NOTE — PLAN OF CARE
Goal Outcome Evaluation:              Outcome Evaluation: Patient rested in bed today, non verbal, Tube feed infusing, wound care done. Dr. Ivory at bedside to see wound prior to dressing change. will monitor.

## 2022-08-14 LAB
GLUCOSE BLDC GLUCOMTR-MCNC: 105 MG/DL (ref 70–130)
GLUCOSE BLDC GLUCOMTR-MCNC: 109 MG/DL (ref 70–130)
GLUCOSE BLDC GLUCOMTR-MCNC: 161 MG/DL (ref 70–130)
GLUCOSE BLDC GLUCOMTR-MCNC: 228 MG/DL (ref 70–130)

## 2022-08-14 PROCEDURE — 63710000001 INSULIN ASPART PER 5 UNITS: Performed by: SURGERY

## 2022-08-14 PROCEDURE — 0HQ6XZZ REPAIR BACK SKIN, EXTERNAL APPROACH: ICD-10-PCS | Performed by: SURGERY

## 2022-08-14 PROCEDURE — 82962 GLUCOSE BLOOD TEST: CPT

## 2022-08-14 PROCEDURE — 63710000001 INSULIN DETEMIR PER 5 UNITS: Performed by: HOSPITALIST

## 2022-08-14 PROCEDURE — 25010000002 HEPARIN (PORCINE) PER 1000 UNITS: Performed by: SURGERY

## 2022-08-14 RX ADMIN — Medication 10 ML: at 20:38

## 2022-08-14 RX ADMIN — LOSARTAN POTASSIUM 50 MG: 50 TABLET, FILM COATED ORAL at 08:04

## 2022-08-14 RX ADMIN — INSULIN ASPART 2 UNITS: 100 INJECTION, SOLUTION INTRAVENOUS; SUBCUTANEOUS at 16:39

## 2022-08-14 RX ADMIN — Medication 5000 UNITS: at 08:04

## 2022-08-14 RX ADMIN — HYDROCODONE BITARTRATE AND ACETAMINOPHEN 10 ML: 7.5; 325 SOLUTION ORAL at 04:24

## 2022-08-14 RX ADMIN — HEPARIN SODIUM 5000 UNITS: 5000 INJECTION INTRAVENOUS; SUBCUTANEOUS at 20:37

## 2022-08-14 RX ADMIN — INSULIN DETEMIR 25 UNITS: 100 INJECTION, SOLUTION SUBCUTANEOUS at 20:42

## 2022-08-14 RX ADMIN — ROPINIROLE HYDROCHLORIDE 0.5 MG: 0.25 TABLET, FILM COATED ORAL at 20:37

## 2022-08-14 RX ADMIN — ATORVASTATIN CALCIUM 40 MG: 40 TABLET, FILM COATED ORAL at 20:37

## 2022-08-14 RX ADMIN — Medication 10 ML: at 08:06

## 2022-08-14 RX ADMIN — ISOSORBIDE MONONITRATE 30 MG: 30 TABLET, EXTENDED RELEASE ORAL at 08:04

## 2022-08-14 RX ADMIN — HEPARIN SODIUM 5000 UNITS: 5000 INJECTION INTRAVENOUS; SUBCUTANEOUS at 08:05

## 2022-08-14 RX ADMIN — LEVOTHYROXINE SODIUM 25 MCG: 25 TABLET ORAL at 06:36

## 2022-08-14 RX ADMIN — DONEPEZIL HYDROCHLORIDE 10 MG: 5 TABLET, FILM COATED ORAL at 20:37

## 2022-08-14 RX ADMIN — ASPIRIN 81 MG: 81 TABLET, CHEWABLE ORAL at 08:04

## 2022-08-14 RX ADMIN — Medication 20 ML: at 08:05

## 2022-08-14 NOTE — PLAN OF CARE
Goal Outcome Evaluation:  Plan of Care Reviewed With: patient           Outcome Evaluation: Patient has rested in bed. No indicators of pain present. VSS. Call light in reach. Bed locked and lowered.

## 2022-08-14 NOTE — PLAN OF CARE
Goal Outcome Evaluation:               PT has rested in bed tonight, Wound care completed per order, Consent has been obtained and surgery bath completed for bedside procedure. VSS, will continue to monitor for changes.

## 2022-08-14 NOTE — PROGRESS NOTES
Procedure Note    Procedure: Partial secondary closure    Location: Sacral decubitus wound    Anesthesia: 1% lidocaine    Description: Consent was obtained from family members.  After prep with Betadine and infiltration with lidocaine the right lateral aspect of the sacral decubitus was closed with a combination of simple and vertical mattress 2-0 nylon sutures.  A distance of 6.5 cm was closed.  Wound was then packed by nursing staff.    Complications:  none    Follow-up: Continue wound care as ordered

## 2022-08-14 NOTE — PROGRESS NOTES
Patient chart, vitals, nursing notes reviewed.  Patient today with right lateral aspect of the sacral decubitus closed by general surgery.  Patient currently remains in swing bed for continued wound care given the severity of her wounds that are beyond the level appropriate for home health wound care and nursing as well as skilled nursing facility placement.  Unfortunately insurance denied patient for long-term acute care hospitalization for the this wound care as she would have an appropriate candidate as she will not be able to receive the wound care she would need at a personal residence.  She has completed a course of antibiotics for her Bacteroides and completed Flagyl on 8/8/2022 and has had resolution of her previous diarrhea.  Patient approved for additional swing bed days through 8/17/2022.

## 2022-08-15 LAB
GLUCOSE BLDC GLUCOMTR-MCNC: 119 MG/DL (ref 70–130)
GLUCOSE BLDC GLUCOMTR-MCNC: 138 MG/DL (ref 70–130)
GLUCOSE BLDC GLUCOMTR-MCNC: 185 MG/DL (ref 70–130)
GLUCOSE BLDC GLUCOMTR-MCNC: 206 MG/DL (ref 70–130)

## 2022-08-15 PROCEDURE — 82962 GLUCOSE BLOOD TEST: CPT

## 2022-08-15 PROCEDURE — 25010000002 HEPARIN (PORCINE) PER 1000 UNITS: Performed by: SURGERY

## 2022-08-15 PROCEDURE — 63710000001 INSULIN DETEMIR PER 5 UNITS: Performed by: HOSPITALIST

## 2022-08-15 PROCEDURE — 63710000001 INSULIN ASPART PER 5 UNITS: Performed by: SURGERY

## 2022-08-15 RX ADMIN — ATORVASTATIN CALCIUM 40 MG: 40 TABLET, FILM COATED ORAL at 20:11

## 2022-08-15 RX ADMIN — Medication 10 ML: at 08:27

## 2022-08-15 RX ADMIN — Medication 20 ML: at 08:27

## 2022-08-15 RX ADMIN — LEVOTHYROXINE SODIUM 25 MCG: 25 TABLET ORAL at 05:39

## 2022-08-15 RX ADMIN — LOSARTAN POTASSIUM 50 MG: 50 TABLET, FILM COATED ORAL at 08:26

## 2022-08-15 RX ADMIN — Medication 20 ML: at 01:20

## 2022-08-15 RX ADMIN — ISOSORBIDE MONONITRATE 30 MG: 30 TABLET, EXTENDED RELEASE ORAL at 08:27

## 2022-08-15 RX ADMIN — ROPINIROLE HYDROCHLORIDE 0.5 MG: 0.25 TABLET, FILM COATED ORAL at 20:11

## 2022-08-15 RX ADMIN — HEPARIN SODIUM 5000 UNITS: 5000 INJECTION INTRAVENOUS; SUBCUTANEOUS at 08:27

## 2022-08-15 RX ADMIN — Medication 20 ML: at 20:12

## 2022-08-15 RX ADMIN — ASPIRIN 81 MG: 81 TABLET, CHEWABLE ORAL at 08:27

## 2022-08-15 RX ADMIN — Medication 5000 UNITS: at 08:27

## 2022-08-15 RX ADMIN — HEPARIN SODIUM 5000 UNITS: 5000 INJECTION INTRAVENOUS; SUBCUTANEOUS at 20:11

## 2022-08-15 RX ADMIN — INSULIN ASPART 4 UNITS: 100 INJECTION, SOLUTION INTRAVENOUS; SUBCUTANEOUS at 16:48

## 2022-08-15 RX ADMIN — DONEPEZIL HYDROCHLORIDE 10 MG: 5 TABLET, FILM COATED ORAL at 20:11

## 2022-08-15 RX ADMIN — INSULIN DETEMIR 25 UNITS: 100 INJECTION, SOLUTION SUBCUTANEOUS at 20:38

## 2022-08-15 RX ADMIN — Medication 10 ML: at 20:11

## 2022-08-15 NOTE — PLAN OF CARE
Goal Outcome Evaluation:              Outcome Evaluation: patient has rested well tonight. wound care and midline dressing completed. patient tolerated well.

## 2022-08-15 NOTE — CASE MANAGEMENT/SOCIAL WORK
Discharge Planning Assessment  DINA Nuñez     Patient Name: Yecenia Botello  MRN: [id number]  Today's Date: 8/15/2022    Admit Date: 7/30/2022       Discharge Plan     Row Name 08/15/22 1057       Plan    Plan Pt was admitted to swing bed on 7/30/22. Pt has been approved by insurance for swing bed through 8/17/22 per swing bed RN. SS spoke to pt's daughter, Yecenia on this date. Pt's daughter voices agreement for pt to remain hospitalized as long as insurance is approving. Pt lives with her daughter, Yecenia and she plans for pt to return home at discharge. Pt did not utilize home health services prior to admission. Pt has a nutrition supplies, wheelchair, reinaldo lift, and hospital bed via [facility]. SS to follow.              Continued Care and Services - Admitted Since 7/30/2022    Coordination has not been started for this encounter.     Selected Continued Care - Prior Encounters Includes selections from prior encounters from 5/1/2022 to 8/15/2022    Discharged on 7/30/2022 Admission date: 7/22/2022 - Discharge disposition: Swing Bed    Destination     Service Provider Selected Services Address Phone Fax Patient Preferred    DINA NUÑEZ SWING BED  [facility] [place] [id number] [phone] -- --                    CHANTELLE Shukla

## 2022-08-15 NOTE — PLAN OF CARE
Goal Outcome Evaluation:           Progress: improving  Outcome Evaluation: Pt resting in bed. No complaints of pain noted. Wound care per orders. No other acute changes noted.

## 2022-08-16 ENCOUNTER — APPOINTMENT (OUTPATIENT)
Dept: GENERAL RADIOLOGY | Facility: HOSPITAL | Age: 72
End: 2022-08-16

## 2022-08-16 LAB
A-A DO2: 96.3 MMHG (ref 0–300)
ARTERIAL PATENCY WRIST A: POSITIVE
ATMOSPHERIC PRESS: 725 MMHG
BASE EXCESS BLDA CALC-SCNC: 4.4 MMOL/L (ref 0–2)
BDY SITE: ABNORMAL
BODY TEMPERATURE: 0 C
CO2 BLDA-SCNC: 28.9 MMOL/L (ref 22–33)
COHGB MFR BLD: 1.8 % (ref 0–5)
GAS FLOW AIRWAY: 2.5 LPM
GLUCOSE BLDC GLUCOMTR-MCNC: 106 MG/DL (ref 70–130)
GLUCOSE BLDC GLUCOMTR-MCNC: 121 MG/DL (ref 70–130)
GLUCOSE BLDC GLUCOMTR-MCNC: 125 MG/DL (ref 70–130)
GLUCOSE BLDC GLUCOMTR-MCNC: 223 MG/DL (ref 70–130)
HCO3 BLDA-SCNC: 27.8 MMOL/L (ref 20–26)
HCT VFR BLD CALC: 27.6 % (ref 38–51)
HGB BLDA-MCNC: 9 G/DL (ref 13.5–17.5)
INHALED O2 CONCENTRATION: 30 %
Lab: ABNORMAL
METHGB BLD QL: 0.2 % (ref 0–3)
MODALITY: ABNORMAL
NOTE: ABNORMAL
NT-PROBNP SERPL-MCNC: 803.7 PG/ML (ref 0–900)
OXYHGB MFR BLDV: 93 % (ref 94–99)
PCO2 BLDA: 36 MM HG (ref 35–45)
PCO2 TEMP ADJ BLD: ABNORMAL MM[HG]
PH BLDA: 7.5 PH UNITS (ref 7.35–7.45)
PH, TEMP CORRECTED: ABNORMAL
PO2 BLDA: 67 MM HG (ref 83–108)
PO2 TEMP ADJ BLD: ABNORMAL MM[HG]
SAO2 % BLDCOA: 94.9 % (ref 94–99)
VENTILATOR MODE: ABNORMAL

## 2022-08-16 PROCEDURE — 82375 ASSAY CARBOXYHB QUANT: CPT

## 2022-08-16 PROCEDURE — 63710000001 INSULIN DETEMIR PER 5 UNITS: Performed by: HOSPITALIST

## 2022-08-16 PROCEDURE — 99309 SBSQ NF CARE MODERATE MDM 30: CPT | Performed by: HOSPITALIST

## 2022-08-16 PROCEDURE — 94761 N-INVAS EAR/PLS OXIMETRY MLT: CPT

## 2022-08-16 PROCEDURE — 25010000002 HEPARIN (PORCINE) PER 1000 UNITS: Performed by: SURGERY

## 2022-08-16 PROCEDURE — 25010000002 FUROSEMIDE PER 20 MG: Performed by: HOSPITALIST

## 2022-08-16 PROCEDURE — 82805 BLOOD GASES W/O2 SATURATION: CPT

## 2022-08-16 PROCEDURE — 71045 X-RAY EXAM CHEST 1 VIEW: CPT

## 2022-08-16 PROCEDURE — 82962 GLUCOSE BLOOD TEST: CPT

## 2022-08-16 PROCEDURE — 83050 HGB METHEMOGLOBIN QUAN: CPT

## 2022-08-16 PROCEDURE — 83880 ASSAY OF NATRIURETIC PEPTIDE: CPT | Performed by: PHYSICIAN ASSISTANT

## 2022-08-16 PROCEDURE — 94799 UNLISTED PULMONARY SVC/PX: CPT

## 2022-08-16 PROCEDURE — 36600 WITHDRAWAL OF ARTERIAL BLOOD: CPT

## 2022-08-16 RX ORDER — FUROSEMIDE 10 MG/ML
40 INJECTION INTRAMUSCULAR; INTRAVENOUS ONCE
Status: COMPLETED | OUTPATIENT
Start: 2022-08-16 | End: 2022-08-16

## 2022-08-16 RX ADMIN — Medication 10 ML: at 20:16

## 2022-08-16 RX ADMIN — ISOSORBIDE MONONITRATE 30 MG: 30 TABLET, EXTENDED RELEASE ORAL at 08:48

## 2022-08-16 RX ADMIN — ASPIRIN 81 MG: 81 TABLET, CHEWABLE ORAL at 08:48

## 2022-08-16 RX ADMIN — ATORVASTATIN CALCIUM 40 MG: 40 TABLET, FILM COATED ORAL at 20:16

## 2022-08-16 RX ADMIN — HEPARIN SODIUM 5000 UNITS: 5000 INJECTION INTRAVENOUS; SUBCUTANEOUS at 20:16

## 2022-08-16 RX ADMIN — HEPARIN SODIUM 5000 UNITS: 5000 INJECTION INTRAVENOUS; SUBCUTANEOUS at 08:48

## 2022-08-16 RX ADMIN — Medication 10 ML: at 08:49

## 2022-08-16 RX ADMIN — DONEPEZIL HYDROCHLORIDE 10 MG: 5 TABLET, FILM COATED ORAL at 20:16

## 2022-08-16 RX ADMIN — LEVOTHYROXINE SODIUM 25 MCG: 25 TABLET ORAL at 05:36

## 2022-08-16 RX ADMIN — Medication 5000 UNITS: at 08:48

## 2022-08-16 RX ADMIN — LOSARTAN POTASSIUM 50 MG: 50 TABLET, FILM COATED ORAL at 08:48

## 2022-08-16 RX ADMIN — FUROSEMIDE 40 MG: 10 INJECTION, SOLUTION INTRAVENOUS at 09:49

## 2022-08-16 RX ADMIN — ROPINIROLE HYDROCHLORIDE 0.5 MG: 0.25 TABLET, FILM COATED ORAL at 20:15

## 2022-08-16 RX ADMIN — Medication 20 ML: at 09:49

## 2022-08-16 RX ADMIN — INSULIN DETEMIR 25 UNITS: 100 INJECTION, SOLUTION SUBCUTANEOUS at 20:27

## 2022-08-16 NOTE — PLAN OF CARE
Goal Outcome Evaluation:              Outcome Evaluation: Pt rested in bed throughout the day. Wound care per order. No nonverbal indicators of discomfort. No acute changes at this time. Will continue plan of care.

## 2022-08-16 NOTE — PLAN OF CARE
Goal Outcome Evaluation:              Outcome Evaluation: Patient in bed on assessment. No s/s of distress noted. Wound care per order. Will continue plan of care.

## 2022-08-16 NOTE — PROGRESS NOTES
[facility] Medicine Services  CROSS COVER NOTE    SUBJECTIVE:    Contacted per RN. Patient with drop in O2 saturation to 84% on room air. Patient transiently required upwards of 3 LPM NC. Patient has since been titrated down back to room air with SPO2 92% on RA per RN. Patient also with increased crackles upon auscultation. Vitals otherwise stable.     OBJECTIVE DATA:  Vitals:    08/15/22 1900   BP: 130/72   Pulse: 79   Resp: 18   Temp: 98.7 °F (37.1 °C)   SpO2: 97%     ACTION TAKEN/MEDICATION CHANGES:    · STAT CXR   · STAT BNP  · PRN nebs ordered   · Patient now back to room air and tolerating well. Continue supplemental oxygen as necessary to titrate SpO2 > 90%. Continuous pulse oximetry monitoring.  · Continue orders per chart   · Further care pending clinical course.       Johana Wilson PA-C  Hospitalist Service -- [facility]   Pager: [phone]    08/16/22  01:54 EDT    Update:   Contacted per RN, SPO2 dropped again to 84% on room air. STAT ABG added to a/p. Await results. Further care pending clinical course.

## 2022-08-16 NOTE — PROGRESS NOTES
[facility] HOSPITALIST PROGRESS NOTE     Patient Identification:  Name:  Yecenia Botello  Age:  71 y.o.  Sex:  female  :  1950  MRN:  [id number]  Visit Number:  [id number]  Primary Care Provider:  Johnathan Ashraf MD    Length of stay:  17    Subjective: Patient seen and examined, she is awake, she is nonverbal, she has visual tracking.  Noted nocturnal desaturation, also reported crackles on her chest on exam.  When I examined the patient her nasal cannula is not on her nostril.  X-ray no obvious pneumonia no infiltrate.  Does not appear to be volume overloaded.  She does appear slightly edematous.    Chief Complaint: Nocturnal desaturate, large sacral ulcer  ----------------------------------------------------------------------------------------------------------------------  Current Hospital Meds:  aspirin, 81 mg, Per PEG Tube, Daily  atorvastatin, 40 mg, Per PEG Tube, Nightly  Dakins (full strength), 20 mL, Topical, Q12H  donepezil, 10 mg, Per PEG Tube, Nightly  heparin (porcine), 5,000 Units, Subcutaneous, Q12H  Insulin Aspart, 0-9 Units, Subcutaneous, TID AC  insulin detemir, 25 Units, Subcutaneous, Nightly  isosorbide mononitrate, 30 mg, Oral, Q24H  levothyroxine, 25 mcg, Per PEG Tube, QAM  losartan, 50 mg, Per PEG Tube, Q24H  rOPINIRole, 0.5 mg, Per PEG Tube, Nightly  sodium chloride, 10 mL, Intravenous, Q12H  vitamin D3, 5,000 Units, Per PEG Tube, Daily         ----------------------------------------------------------------------------------------------------------------------  Vital Signs:  Temp:  [98.3 °F (36.8 °C)-98.7 °F (37.1 °C)] 98.3 °F (36.8 °C)  Heart Rate:  [75-79] 75  Resp:  [18] 18  BP: (130-148)/(72-73) 148/73       Tele:       22  0500 08/15/22  0500 22  0500   Weight: 61.8 kg (136 lb 3.2 oz) 62.6 kg (137 lb 14.4 oz) 62.6 kg (137 lb 14.4 oz)     Body mass index is 23.66 kg/m².    Intake/Output Summary (Last 24 hours) at 2022 1120  Last data filed at  8/16/2022 0543  Gross per 24 hour   Intake 2560 ml   Output --   Net 2560 ml     NPO Diet NPO Type: Strict NPO  ----------------------------------------------------------------------------------------------------------------------  Physical exam:  General: awake, alert, does not communicate, she has visual tracking, appears comfortable.    No respiratory distress.    Skin:  Skin is warm and dry. No rash noted. No pallor.    HENT:  Head:  Normocephalic and atraumatic.  Mouth:  Moist mucous membranes.    Eyes:  Conjunctivae and EOM are normal.  Pupils are equal, round, and reactive to light.  No scleral icterus.    Neck:  Neck supple.  No JVD present.    She has hepatojugular reflux, no bruit  Pulmonary/Chest:  No respiratory distress, no wheezes, no crackles, with normal breath sounds and good air movement.  Cardiovascular: Faint murmur of mitral area, no gallop no rub regular rhythm.  Abdominal:  Soft.  Bowel sounds are normal.  No distension and no tenderness.   Extremities:  No edema, no tenderness, and no deformity.  No red or swollen joints anywhere.  Strong pulses in all 4 extremities with no clubbing, no cyanosis, no edema.  Neurological:  Motor strength equal no obvious deficit, sensory grossly intact.   No cranial nerve deficit.  No tongue deviation.  No facial droop.  No slurred speech.    Genitourinary: No Raines catheter  Back: Right paramedial ulcer with dressing  ----------------------------------------------------------------------------------------------------------------------  ----------------------------------------------------------------------------------------------------------------------      Results from last 7 days   Lab Units 08/13/22  0533 08/12/22  0638 08/11/22  0740   WBC 10*3/mm3 7.65 6.90 6.65   HEMOGLOBIN g/dL 8.7* 8.1* 8.0*   HEMATOCRIT % 27.8* 25.5* 26.2*   MCV fL 97.5* 97.3* 99.2*   MCHC g/dL 31.3* 31.8 30.5*   PLATELETS 10*3/mm3 280 313 290     Results from last 7 days   Lab Units  08/16/22  0220   PH, ARTERIAL pH units 7.496*   PO2 ART mm Hg 67.0*   PCO2, ARTERIAL mm Hg 36.0   HCO3 ART mmol/L 27.8*     Results from last 7 days   Lab Units 08/13/22  0533 08/12/22  0638 08/11/22  0740   SODIUM mmol/L 135* 135* 134*   POTASSIUM mmol/L 4.4 4.3 4.4   CHLORIDE mmol/L 102 102 104   CO2 mmol/L 22.9 23.8 23.0   BUN mg/dL 14 16 14   CREATININE mg/dL 0.43* 0.43* 0.37*   CALCIUM mg/dL 8.4* 8.2* 8.2*   GLUCOSE mg/dL 164* 238* 98   Estimated Creatinine Clearance: 120.3 mL/min (A) (by C-G formula based on SCr of 0.43 mg/dL (L)).    No results found for: AMMONIA      No results found for: BLOODCX  No results found for: URINECX  No results found for: WOUNDCX  No results found for: STOOLCX    I have personally looked at the labs and they are summarized above.  ----------------------------------------------------------------------------------------------------------------------  Imaging Results (Last 24 Hours)     Procedure Component Value Units Date/Time    XR Chest 1 View [[id number]] Collected: 08/16/22 0242     Updated: 08/16/22 0244    Narrative:      CR Chest 1 Vw    INDICATION:   Hypoxia with labored breathing     COMPARISON:    8/13/2022    FINDINGS:  Portable AP view(s) of the chest.        Since the previous examination the heart, lungs and mediastinum are unchanged. The aorta is tortuous. No new infiltrates are seen. Vascular markings are normal. No effusions are seen.       Impression:      No new infiltrates are noted since the previous examination.    Signer Name: Johnathan Herbret MD   Signed: 8/16/2022 2:42 AM   Workstation Name: [id number]    [facility]        ----------------------------------------------------------------------------------------------------------------------  Assessment and Plan:  -Large sacral decubiti present on admission status postdebridement, status post partial suture  -History of CVA nonverbal, status post PEG tube placement  -Diabetes type 2 with  hyperglycemia  -Nocturnal hypoxia  -History of essential hypertension  -History of hypothyroid    Pulmonary toilet, oropharyngeal suctioning every shift, proper placement of oxygen supplementation, empiric dose of Lasix x1, aspiration precaution.  Continue Accu-Chek and sliding scale, wound care.      Disposition swing bed, family wants to bring her home once discharged.      Edwina Dodson MD  08/16/22  11:20 EDT

## 2022-08-16 NOTE — CONSULTS
Consult Note     Patient Identification:  Name:  Yecenia Botello  Age:  71 y.o.  Sex:  female  :  1950  MRN:  [id number]   Visit Number:  [id number]  Primary Care Physician:  Johnathan Ashraf MD     Subjective     Chief complaint:     Pressure injury     History of presenting illness:     Patient is a 71 y.o. female with history of stroke with significant residual deficits, now non-ambulatory and minimally verbal, who presents to ED on 2022 with complaints of worsening sacral decubitus ulcer per family. She was reportedly admitted to [facility] in 2022 for MARLON and failure to thrive, during which time she developed a sacral decubitus ulcer for which she was sent home with silvadene dressings and wound care instructions. Her family has been treating with  honey. However, her wound has gotten worse with increasing exudative drainage that is foul smelling. She had area debrided on 2022 by Dr. Ivory. Penrose drain placed to right gluteal. Please note history obtained from chart review and NATIVIDAD Hill as patient is unable to update HPI.    22  Resting in bed. Not communicative. NAD noted. Had recent extensive surgical debridement in OR. Tolerating treatments.    2022: Patient seen resting in bed. Unable to update HPI. Jessica HENSON present during exam. Patient had right lateral aspect of wound closed by Dr. Ivory on 2022. Lateral aspect sutures intake without evidence of dehiscene, or cellulitis. Small area of induration noted around suture line. Vital signs stable, afebrile. No new issues or concerns reported. Vital signs stable, afebrile. Plans for patient to return home with family.   ---------------------------------------------------------------------------------------------------------------------   Review of Systems:  Review of Systems   Unable to perform ROS: Patient nonverbal       ---------------------------------------------------------------------------------------------------------------------   Past Medical History:   Diagnosis Date   • Stroke (cerebrum) (HCC)      Past Surgical History:   Procedure Laterality Date   • DEBRIDEMENT OF ISCHIAL ULCER/BUTTOCKS WOUND N/A 7/23/2022    Procedure: DEBRIDEMENT OF ISCHIAL ULCER/BUTTOCKS WOUND;  Surgeon: Daja Ivory MD;  Location: [facility];  Service: General;  Laterality: N/A;   • DEBRIDEMENT OF ISCHIAL ULCER/BUTTOCKS WOUND N/A 8/4/2022    Procedure: DEBRIDEMENT OF ISCHIAL ULCER/BUTTOCKS WOUND;  Surgeon: Daja Ivory MD;  Location: [facility];  Service: General;  Laterality: N/A;     History reviewed. No pertinent family history.  Social History     Socioeconomic History   • Marital status:    Tobacco Use   • Smoking status: Never Smoker   Vaping Use   • Vaping Use: Unknown   Substance and Sexual Activity   • Alcohol use: Defer   • Drug use: Defer   • Sexual activity: Defer     ---------------------------------------------------------------------------------------------------------------------   Allergies:  Patient has no known allergies.  ---------------------------------------------------------------------------------------------------------------------   Medications below are reported home medications pulling from within the system; at this time, these medications have not been reconciled unless otherwise specified and are in the verification process for further verifcation as current home medications.    Prior to Admission Medications     Prescriptions Last Dose Informant Patient Reported? Taking?    amLODIPine (NORVASC) 10 MG tablet Unknown Child Yes No    Take 10 mg by mouth Daily.    aspirin 81 MG EC tablet Unknown Child Yes No    Take 81 mg by mouth Daily.    atorvastatin (LIPITOR) 40 MG tablet Unknown Child Yes No    Take 40 mg by mouth Every Night.    docusate sodium (COLACE) 100 MG capsule Unknown Child Yes No     Take 100 mg by mouth 2 (Two) Times a Day.    donepezil (ARICEPT) 10 MG tablet Unknown Child Yes No    Take 10 mg by mouth Every Night.    gabapentin (NEURONTIN) 300 MG capsule Unknown Child Yes No    Take 300 mg by mouth 2 (Two) Times a Day.    Insulin Glargine (BASAGLAR KWIKPEN) 100 UNIT/ML injection pen Unknown Child Yes No    Inject 30 Units under the skin into the appropriate area as directed Every Night.    Insulin Lispro (humaLOG) 100 UNIT/ML injection Unknown Child Yes No    Inject 6 Units under the skin into the appropriate area as directed 3 (Three) Times a Day Before Meals.    isosorbide mononitrate (IMDUR) 30 MG 24 hr tablet Unknown Child Yes No    Take 30 mg by mouth Daily.    levothyroxine (SYNTHROID, LEVOTHROID) 25 MCG tablet Unknown Child Yes No    Take 25 mcg by mouth Daily.    rOPINIRole (REQUIP) 0.5 MG tablet Unknown Child Yes No    Take 0.5 mg by mouth Every Night. Take 1 hour before bedtime.    vitamin D3 125 MCG (5000 UT) capsule capsule Unknown Child Yes No    Take 5,000 Units by mouth Daily.        ---------------------------------------------------------------------------------------------------------------------    Objective     Hospital Scheduled Meds:  aspirin, 81 mg, Per PEG Tube, Daily  atorvastatin, 40 mg, Per PEG Tube, Nightly  Dakins (full strength), 20 mL, Topical, Q12H  donepezil, 10 mg, Per PEG Tube, Nightly  heparin (porcine), 5,000 Units, Subcutaneous, Q12H  Insulin Aspart, 0-9 Units, Subcutaneous, TID AC  insulin detemir, 25 Units, Subcutaneous, Nightly  isosorbide mononitrate, 30 mg, Oral, Q24H  levothyroxine, 25 mcg, Per PEG Tube, QAM  losartan, 50 mg, Per PEG Tube, Q24H  rOPINIRole, 0.5 mg, Per PEG Tube, Nightly  sodium chloride, 10 mL, Intravenous, Q12H  vitamin D3, 5,000 Units, Per PEG Tube, Daily           Current listed hospital scheduled medications may not yet reflect those currently placed in orders that are signed and held, awaiting patient's arrival to floor/unit.     ---------------------------------------------------------------------------------------------------------------------   Vital Signs:  Temp:  [98.3 °F (36.8 °C)-98.7 °F (37.1 °C)] 98.3 °F (36.8 °C)  Heart Rate:  [75-79] 75  Resp:  [18] 18  BP: (130-148)/(72-73) 148/73  No data found.  SpO2 Percentage    08/15/22 0600 08/15/22 1900 08/16/22 0751   SpO2: 99% 97% 99%     SpO2:  [97 %-99 %] 99 %  on  Flow (L/min):  [2-2.5] 2;   Device (Oxygen Therapy): nasal cannula    Body mass index is 23.66 kg/m².  Wt Readings from Last 3 Encounters:   08/16/22 62.6 kg (137 lb 14.4 oz)   07/30/22 61.5 kg (135 lb 8 oz)   02/26/15 95.7 kg (211 lb)       ---------------------------------------------------------------------------------------------------------------------   Physical Exam:  Physical Exam  Constitutional:       General: She is not in acute distress.     Appearance: She is not toxic-appearing.   HENT:      Head: Normocephalic.   Cardiovascular:      Rate and Rhythm: Normal rate and regular rhythm.   Pulmonary:      Effort: Pulmonary effort is normal. No respiratory distress.   Abdominal:      General: There is no distension.   Musculoskeletal:      Cervical back: Normal range of motion and neck supple.   Skin:     General: Skin is warm and dry.      Comments: Large pressure injury in the sacral area now extending to the buttocks post sharp debridement. Base appears essentially clean wit no purulence or malodor noted. Sutures noted to right buttock. Medial right buttock sutures with scattered areas that are dehisced. Lateral sutures intact, induration noted to suture line.   Neurological:      Mental Status: She is alert.       Assessment & Plan      Recommend routine turning and pressure re-distribution. Turn q 2 hours while in bed, every 15 minutes if in a chair. Float heels. Recommend adequate hydration, along with protein and vitamin intake. Open wounds can serve as a nidus for local and systemic infection. Needs  monitoring. Recommend low airloss/alternating pressure support surface.    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 Recommend saline moistened gauze BID.  Consider a wound vac to remain area.  Diabetes   -Recommend adequate glycemic control to help promote wound healing       Recommend follow-up in outpatient wound clinic once stable for discharge.    GOLDIE Wang  [facility]  08/16/2022  [id number]  Consults

## 2022-08-16 NOTE — NURSING NOTE
Johana BOTELLO notified of patient crackling. Telemetry called with sats at 84% on room air. Patient does not normally wear oxygen. RN placed 2.5L NC to get sats above 90%. Patient was on 2.5L for 5 minutes then taken off to withstand sats >90. New orders for chest xray, breathing treatments. Stated if patients oxygen levels drop again to notify for ABGs.

## 2022-08-17 LAB
GLUCOSE BLDC GLUCOMTR-MCNC: 109 MG/DL (ref 70–130)
GLUCOSE BLDC GLUCOMTR-MCNC: 140 MG/DL (ref 70–130)
GLUCOSE BLDC GLUCOMTR-MCNC: 146 MG/DL (ref 70–130)
GLUCOSE BLDC GLUCOMTR-MCNC: 173 MG/DL (ref 70–130)

## 2022-08-17 PROCEDURE — 82962 GLUCOSE BLOOD TEST: CPT

## 2022-08-17 PROCEDURE — 25010000002 HEPARIN (PORCINE) PER 1000 UNITS: Performed by: SURGERY

## 2022-08-17 PROCEDURE — 25010000002 FUROSEMIDE PER 20 MG: Performed by: HOSPITALIST

## 2022-08-17 PROCEDURE — 63710000001 INSULIN ASPART PER 5 UNITS: Performed by: SURGERY

## 2022-08-17 PROCEDURE — 94761 N-INVAS EAR/PLS OXIMETRY MLT: CPT

## 2022-08-17 PROCEDURE — 94799 UNLISTED PULMONARY SVC/PX: CPT

## 2022-08-17 RX ORDER — FUROSEMIDE 10 MG/ML
20 INJECTION INTRAMUSCULAR; INTRAVENOUS ONCE
Status: COMPLETED | OUTPATIENT
Start: 2022-08-17 | End: 2022-08-17

## 2022-08-17 RX ORDER — METOLAZONE 2.5 MG/1
2.5 TABLET ORAL DAILY
Status: COMPLETED | OUTPATIENT
Start: 2022-08-17 | End: 2022-08-17

## 2022-08-17 RX ADMIN — ASPIRIN 81 MG: 81 TABLET, CHEWABLE ORAL at 08:35

## 2022-08-17 RX ADMIN — Medication 5000 UNITS: at 08:35

## 2022-08-17 RX ADMIN — Medication 20 ML: at 20:25

## 2022-08-17 RX ADMIN — ROPINIROLE HYDROCHLORIDE 0.5 MG: 0.25 TABLET, FILM COATED ORAL at 20:23

## 2022-08-17 RX ADMIN — Medication 20 ML: at 09:36

## 2022-08-17 RX ADMIN — HEPARIN SODIUM 5000 UNITS: 5000 INJECTION INTRAVENOUS; SUBCUTANEOUS at 20:23

## 2022-08-17 RX ADMIN — LOSARTAN POTASSIUM 50 MG: 50 TABLET, FILM COATED ORAL at 08:35

## 2022-08-17 RX ADMIN — ATORVASTATIN CALCIUM 40 MG: 40 TABLET, FILM COATED ORAL at 20:23

## 2022-08-17 RX ADMIN — METOLAZONE 2.5 MG: 2.5 TABLET ORAL at 13:01

## 2022-08-17 RX ADMIN — FUROSEMIDE 20 MG: 10 INJECTION, SOLUTION INTRAMUSCULAR; INTRAVENOUS at 13:01

## 2022-08-17 RX ADMIN — DONEPEZIL HYDROCHLORIDE 10 MG: 5 TABLET, FILM COATED ORAL at 20:23

## 2022-08-17 RX ADMIN — Medication 10 ML: at 09:36

## 2022-08-17 RX ADMIN — HEPARIN SODIUM 5000 UNITS: 5000 INJECTION INTRAVENOUS; SUBCUTANEOUS at 08:35

## 2022-08-17 RX ADMIN — Medication 20 ML: at 05:31

## 2022-08-17 RX ADMIN — INSULIN ASPART 2 UNITS: 100 INJECTION, SOLUTION INTRAVENOUS; SUBCUTANEOUS at 17:49

## 2022-08-17 RX ADMIN — LEVOTHYROXINE SODIUM 25 MCG: 25 TABLET ORAL at 05:31

## 2022-08-17 RX ADMIN — ISOSORBIDE MONONITRATE 30 MG: 30 TABLET, EXTENDED RELEASE ORAL at 08:35

## 2022-08-17 RX ADMIN — Medication 10 ML: at 20:23

## 2022-08-17 NOTE — NURSING NOTE
Yecenia Davis notified that patient was moved rooms from 47a to 44A. Daughter stated she would be here in the morning to hopefully take patient back home with her.

## 2022-08-17 NOTE — PLAN OF CARE
Goal Outcome Evaluation:              Outcome Evaluation: patient has rested well tonight no changes during shift.

## 2022-08-17 NOTE — CONSULTS
Consult Note     Patient Identification:  Name:  Yecenia Botello  Age:  71 y.o.  Sex:  female  :  1950  MRN:  [id number]   Visit Number:  [id number]  Primary Care Physician:  Johnathan Ashraf MD     Subjective     Chief complaint:     Pressure injury     History of presenting illness:     Patient is a 71 y.o. female with history of stroke with significant residual deficits, now non-ambulatory and minimally verbal, who presents to ED on 2022 with complaints of worsening sacral decubitus ulcer per family. She was reportedly admitted to [facility] in 2022 for MARLON and failure to thrive, during which time she developed a sacral decubitus ulcer for which she was sent home with silvadene dressings and wound care instructions. Her family has been treating with  honey. However, her wound has gotten worse with increasing exudative drainage that is foul smelling. She had area debrided on 2022 by Dr. Ivory. Penrose drain placed to right gluteal. Please note history obtained from chart review and NATIVIDAD Hill as patient is unable to update HPI.    22  Resting in bed. Not communicative. NAD noted. Had recent extensive surgical debridement in OR. Tolerating treatments.    2022: Patient seen resting in bed. Unable to update HPI. Jessica HENSON present during exam. Patient had right lateral aspect of wound closed by Dr. Ivory on 2022. Lateral aspect sutures intake without evidence of dehiscene, or cellulitis. Small area of induration noted around suture line. Vital signs stable, afebrile. No new issues or concerns reported. Vital signs stable, afebrile. Plans for patient to return home with family.     2022: Patient seen resting in bed. NATIVIDAD Newton present during exam. Wounds overall stable. Left lower leg swelling has decreased. Denies any new issues or concerns. Vital signs stable, afebrile.    ---------------------------------------------------------------------------------------------------------------------   Review of Systems:  Review of Systems   Unable to perform ROS: Patient nonverbal      ---------------------------------------------------------------------------------------------------------------------   Past Medical History:   Diagnosis Date   • Stroke (cerebrum) (HCC)      Past Surgical History:   Procedure Laterality Date   • DEBRIDEMENT OF ISCHIAL ULCER/BUTTOCKS WOUND N/A 7/23/2022    Procedure: DEBRIDEMENT OF ISCHIAL ULCER/BUTTOCKS WOUND;  Surgeon: Daja Ivory MD;  Location: [facility];  Service: General;  Laterality: N/A;   • DEBRIDEMENT OF ISCHIAL ULCER/BUTTOCKS WOUND N/A 8/4/2022    Procedure: DEBRIDEMENT OF ISCHIAL ULCER/BUTTOCKS WOUND;  Surgeon: Daja Ivory MD;  Location: [facility];  Service: General;  Laterality: N/A;     History reviewed. No pertinent family history.  Social History     Socioeconomic History   • Marital status:    Tobacco Use   • Smoking status: Never Smoker   Vaping Use   • Vaping Use: Unknown   Substance and Sexual Activity   • Alcohol use: Defer   • Drug use: Defer   • Sexual activity: Defer     ---------------------------------------------------------------------------------------------------------------------   Allergies:  Patient has no known allergies.  ---------------------------------------------------------------------------------------------------------------------   Medications below are reported home medications pulling from within the system; at this time, these medications have not been reconciled unless otherwise specified and are in the verification process for further verifcation as current home medications.    Prior to Admission Medications     Prescriptions Last Dose Informant Patient Reported? Taking?    amLODIPine (NORVASC) 10 MG tablet Unknown Child Yes No    Take 10 mg by mouth Daily.    aspirin 81 MG EC tablet Unknown  Child Yes No    Take 81 mg by mouth Daily.    atorvastatin (LIPITOR) 40 MG tablet Unknown Child Yes No    Take 40 mg by mouth Every Night.    docusate sodium (COLACE) 100 MG capsule Unknown Child Yes No    Take 100 mg by mouth 2 (Two) Times a Day.    donepezil (ARICEPT) 10 MG tablet Unknown Child Yes No    Take 10 mg by mouth Every Night.    gabapentin (NEURONTIN) 300 MG capsule Unknown Child Yes No    Take 300 mg by mouth 2 (Two) Times a Day.    Insulin Glargine (BASAGLAR KWIKPEN) 100 UNIT/ML injection pen Unknown Child Yes No    Inject 30 Units under the skin into the appropriate area as directed Every Night.    Insulin Lispro (humaLOG) 100 UNIT/ML injection Unknown Child Yes No    Inject 6 Units under the skin into the appropriate area as directed 3 (Three) Times a Day Before Meals.    isosorbide mononitrate (IMDUR) 30 MG 24 hr tablet Unknown Child Yes No    Take 30 mg by mouth Daily.    levothyroxine (SYNTHROID, LEVOTHROID) 25 MCG tablet Unknown Child Yes No    Take 25 mcg by mouth Daily.    rOPINIRole (REQUIP) 0.5 MG tablet Unknown Child Yes No    Take 0.5 mg by mouth Every Night. Take 1 hour before bedtime.    vitamin D3 125 MCG (5000 UT) capsule capsule Unknown Child Yes No    Take 5,000 Units by mouth Daily.        ---------------------------------------------------------------------------------------------------------------------    Objective     Hospital Scheduled Meds:  aspirin, 81 mg, Per PEG Tube, Daily  atorvastatin, 40 mg, Per PEG Tube, Nightly  Dakins (full strength), 20 mL, Topical, Q12H  donepezil, 10 mg, Per PEG Tube, Nightly  heparin (porcine), 5,000 Units, Subcutaneous, Q12H  Insulin Aspart, 0-9 Units, Subcutaneous, TID AC  insulin detemir, 25 Units, Subcutaneous, Nightly  isosorbide mononitrate, 30 mg, Oral, Q24H  levothyroxine, 25 mcg, Per PEG Tube, QAM  losartan, 50 mg, Per PEG Tube, Q24H  rOPINIRole, 0.5 mg, Per PEG Tube, Nightly  sodium chloride, 10 mL, Intravenous, Q12H  vitamin D3, 5,000  Units, Per PEG Tube, Daily           Current listed hospital scheduled medications may not yet reflect those currently placed in orders that are signed and held, awaiting patient's arrival to floor/unit.    ---------------------------------------------------------------------------------------------------------------------   Vital Signs:  Temp:  [97.9 °F (36.6 °C)-99 °F (37.2 °C)] 97.9 °F (36.6 °C)  Heart Rate:  [74-89] 74  Resp:  [16-18] 16  BP: (116-154)/(55-66) 154/55  Mean Arterial Pressure (Non-Invasive) for the past 24 hrs (Last 3 readings):   Noninvasive MAP (mmHg)   08/17/22 0619 125     SpO2 Percentage    08/17/22 0300 08/17/22 0619 08/17/22 0734   SpO2: 94% 95% 97%     SpO2:  [94 %-99 %] 97 %  on   ;   Device (Oxygen Therapy): room air    Body mass index is 23.42 kg/m².  Wt Readings from Last 3 Encounters:   08/17/22 61.9 kg (136 lb 8 oz)   07/30/22 61.5 kg (135 lb 8 oz)   02/26/15 95.7 kg (211 lb)       ---------------------------------------------------------------------------------------------------------------------   Physical Exam:  Physical Exam  Constitutional:       General: She is not in acute distress.     Appearance: She is not toxic-appearing.   HENT:      Head: Normocephalic.   Cardiovascular:      Rate and Rhythm: Normal rate and regular rhythm.   Pulmonary:      Effort: Pulmonary effort is normal. No respiratory distress.   Abdominal:      General: There is no distension.   Musculoskeletal:      Cervical back: Normal range of motion and neck supple.   Skin:     General: Skin is warm and dry.      Comments: Large pressure injury in the sacral area now extending to the buttocks post sharp debridement. Base appears essentially clean wit no purulence or malodor noted. Sutures noted to right buttock. Medial right buttock sutures with scattered areas that are dehisced. Lateral sutures intact, induration noted to suture line.   Neurological:      Mental Status: She is alert.       Assessment & Plan       Recommend routine turning and pressure re-distribution. Turn q 2 hours while in bed, every 15 minutes if in a chair. Float heels. Recommend adequate hydration, along with protein and vitamin intake. Open wounds can serve as a nidus for local and systemic infection. Needs monitoring. Recommend low airloss/alternating pressure support surface.    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 Recommend saline moistened  gauze BID.  Consider a wound vac to remain area.  Diabetes   -Recommend adequate glycemic control to help promote wound healing       Recommend follow-up in outpatient wound clinic once stable for discharge.    GOLDIE Wang  [facility]  08/17/2022  [id number]  Consults

## 2022-08-18 ENCOUNTER — READMISSION MANAGEMENT (OUTPATIENT)
Dept: CALL CENTER | Facility: HOSPITAL | Age: 72
End: 2022-08-18

## 2022-08-18 VITALS
DIASTOLIC BLOOD PRESSURE: 73 MMHG | BODY MASS INDEX: 23.01 KG/M2 | HEART RATE: 83 BPM | HEIGHT: 64 IN | SYSTOLIC BLOOD PRESSURE: 145 MMHG | WEIGHT: 134.8 LBS | TEMPERATURE: 98.9 F | RESPIRATION RATE: 16 BRPM | OXYGEN SATURATION: 96 %

## 2022-08-18 PROBLEM — I50.22 CHRONIC SYSTOLIC HEART FAILURE: Status: ACTIVE | Noted: 2022-08-18

## 2022-08-18 LAB
ANION GAP SERPL CALCULATED.3IONS-SCNC: 8.3 MMOL/L (ref 5–15)
BASOPHILS # BLD AUTO: 0.06 10*3/MM3 (ref 0–0.2)
BASOPHILS NFR BLD AUTO: 0.8 % (ref 0–1.5)
BUN SERPL-MCNC: 14 MG/DL (ref 8–23)
BUN/CREAT SERPL: 30.4 (ref 7–25)
CALCIUM SPEC-SCNC: 8.7 MG/DL (ref 8.6–10.5)
CHLORIDE SERPL-SCNC: 96 MMOL/L (ref 98–107)
CO2 SERPL-SCNC: 26.7 MMOL/L (ref 22–29)
CREAT SERPL-MCNC: 0.46 MG/DL (ref 0.57–1)
DEPRECATED RDW RBC AUTO: 61.5 FL (ref 37–54)
EGFRCR SERPLBLD CKD-EPI 2021: 103.1 ML/MIN/1.73
EOSINOPHIL # BLD AUTO: 0.28 10*3/MM3 (ref 0–0.4)
EOSINOPHIL NFR BLD AUTO: 3.6 % (ref 0.3–6.2)
ERYTHROCYTE [DISTWIDTH] IN BLOOD BY AUTOMATED COUNT: 17.2 % (ref 12.3–15.4)
GLUCOSE BLDC GLUCOMTR-MCNC: 151 MG/DL (ref 70–130)
GLUCOSE BLDC GLUCOMTR-MCNC: 283 MG/DL (ref 70–130)
GLUCOSE SERPL-MCNC: 255 MG/DL (ref 65–99)
HCT VFR BLD AUTO: 29.3 % (ref 34–46.6)
HGB BLD-MCNC: 9.2 G/DL (ref 12–15.9)
IMM GRANULOCYTES # BLD AUTO: 0.03 10*3/MM3 (ref 0–0.05)
IMM GRANULOCYTES NFR BLD AUTO: 0.4 % (ref 0–0.5)
LYMPHOCYTES # BLD AUTO: 2.21 10*3/MM3 (ref 0.7–3.1)
LYMPHOCYTES NFR BLD AUTO: 28.4 % (ref 19.6–45.3)
MCH RBC QN AUTO: 30.8 PG (ref 26.6–33)
MCHC RBC AUTO-ENTMCNC: 31.4 G/DL (ref 31.5–35.7)
MCV RBC AUTO: 98 FL (ref 79–97)
MONOCYTES # BLD AUTO: 0.49 10*3/MM3 (ref 0.1–0.9)
MONOCYTES NFR BLD AUTO: 6.3 % (ref 5–12)
NEUTROPHILS NFR BLD AUTO: 4.7 10*3/MM3 (ref 1.7–7)
NEUTROPHILS NFR BLD AUTO: 60.5 % (ref 42.7–76)
NRBC BLD AUTO-RTO: 0 /100 WBC (ref 0–0.2)
PLATELET # BLD AUTO: 363 10*3/MM3 (ref 140–450)
PMV BLD AUTO: 11 FL (ref 6–12)
POTASSIUM SERPL-SCNC: 3.7 MMOL/L (ref 3.5–5.2)
RBC # BLD AUTO: 2.99 10*6/MM3 (ref 3.77–5.28)
SODIUM SERPL-SCNC: 131 MMOL/L (ref 136–145)
WBC NRBC COR # BLD: 7.77 10*3/MM3 (ref 3.4–10.8)

## 2022-08-18 PROCEDURE — 94761 N-INVAS EAR/PLS OXIMETRY MLT: CPT

## 2022-08-18 PROCEDURE — 85025 COMPLETE CBC W/AUTO DIFF WBC: CPT | Performed by: HOSPITALIST

## 2022-08-18 PROCEDURE — 63710000001 INSULIN ASPART PER 5 UNITS: Performed by: SURGERY

## 2022-08-18 PROCEDURE — 82962 GLUCOSE BLOOD TEST: CPT

## 2022-08-18 PROCEDURE — 80048 BASIC METABOLIC PNL TOTAL CA: CPT | Performed by: HOSPITALIST

## 2022-08-18 PROCEDURE — 99316 NF DSCHRG MGMT 30 MIN+: CPT | Performed by: HOSPITALIST

## 2022-08-18 PROCEDURE — 25010000002 HEPARIN (PORCINE) PER 1000 UNITS: Performed by: SURGERY

## 2022-08-18 PROCEDURE — 94799 UNLISTED PULMONARY SVC/PX: CPT

## 2022-08-18 RX ORDER — LOSARTAN POTASSIUM 50 MG/1
50 TABLET ORAL
Qty: 30 TABLET | Refills: 3 | Status: SHIPPED | OUTPATIENT
Start: 2022-08-19

## 2022-08-18 RX ADMIN — HEPARIN SODIUM 5000 UNITS: 5000 INJECTION INTRAVENOUS; SUBCUTANEOUS at 09:46

## 2022-08-18 RX ADMIN — LEVOTHYROXINE SODIUM 25 MCG: 25 TABLET ORAL at 06:01

## 2022-08-18 RX ADMIN — Medication 20 ML: at 09:45

## 2022-08-18 RX ADMIN — INSULIN ASPART 6 UNITS: 100 INJECTION, SOLUTION INTRAVENOUS; SUBCUTANEOUS at 08:34

## 2022-08-18 RX ADMIN — Medication 5000 UNITS: at 09:47

## 2022-08-18 RX ADMIN — Medication 10 ML: at 09:48

## 2022-08-18 RX ADMIN — ISOSORBIDE MONONITRATE 30 MG: 30 TABLET, EXTENDED RELEASE ORAL at 09:47

## 2022-08-18 RX ADMIN — LOSARTAN POTASSIUM 50 MG: 50 TABLET, FILM COATED ORAL at 09:47

## 2022-08-18 RX ADMIN — ASPIRIN 81 MG: 81 TABLET, CHEWABLE ORAL at 09:45

## 2022-08-18 NOTE — PLAN OF CARE
Goal Outcome Evaluation:           Progress: no change  Outcome Evaluation: Patient has no changes throughout the night. wound care done per orders, wound appears to be healing. VSS. will continue to monitor.

## 2022-08-18 NOTE — DISCHARGE SUMMARY
[facility] MEDICINE DISCHARGE SUMMARY    Patient Identification:  Name:  Yecenia Botello  Age:  71 y.o.  Sex:  female  :  1950  MRN:  [id number]  Visit Number:  [id number]    Date of Admission: 2022  Date of Discharge: 2022  DISCHARGE DISPOSITION   Stable  PCP: Johnathan Ashraf MD    DISCHARGE DIAGNOSIS : Very large pressure ulcer of the sacrum present on admission with no osteomyelitis on work-up, treated with aggressive wound care in debridement, chronic debility, history of CVA nonverbal, history of PEG tube placement, severe sepsis present on admission secondary to infected decubiti, diabetes type 2 fairly controlled, constipation resolved.     HOSPITAL COURSE  For detailed history and physical exam I would refer you to the admission note was done on 2022, patient was brought in by family due to increased drainage and worsening of the ulcer at the sacral area, on admission cultures were performed, blood did not grow any bacteria, wound culture grew E. coli and Klebsiella species pansensitive.  MRI reviewed did not show any obvious abscess or osteomyelitis.  The size of the ulcer was at least 7 x 28 cm in size.  General surgery and wound care service was consulted patient underwent at least 2 surgical debridements and partial closure of the ulcer.  Penrose drain was also temporary placed.  Infectious disease was consulted and antibiotic was tailored based on the culture findings.  She completed her antibiotic treatment, her wound has improved, discussion with family importance of close monitoring and wound care, recommended temporary skilled nursing home facility, family declined.  Rest of her stay was uneventful, she did have episodes of nocturnal hypoxia, she was noted to have poor handling of her oropharyngeal secretions and was addressed with suctioning with good results.  She also appeared to have mild hypervolemia improved with Lasix.  Rest of her  stay was uneventful, frequent change of dressing, change of position side to side to relieve the pressure of the ulcer was done.  Plan is to discharge home today, she will continue wound care, follow-up with Dr. Ivory as well as outpatient wound care clinic.  Continue aspiration precaution and oropharyngeal care.     To promote healing of the ulcer and minimize moisture, Raines catheter was placed.      VITAL SIGNS:      08/16/22  0500 08/17/22  0500 08/18/22  0500   Weight: 62.6 kg (137 lb 14.4 oz) 61.9 kg (136 lb 8 oz) 61.1 kg (134 lb 12.8 oz)     Body mass index is 23.13 kg/m².  Vitals:    08/18/22 0738   BP:    Pulse:    Resp:    Temp:    SpO2: 96%     PHYSICAL EXAM:  General: She has visual tracking she does not communicate, comfortable, in bed,  No respiratory distress.    Skin:  Skin is warm and dry. No rash noted. No pallor.    HENT:  Head:  Normocephalic and atraumatic.  Mouth:  Moist mucous membranes.    Eyes:  Conjunctivae and EOM are normal.  Pupils are equal, round, and reactive to light.  No scleral icterus.    Neck:  Neck supple.  No JVD present.    No bruit no hepatojugular reflux  Pulmonary/Chest:  No respiratory distress, no wheezes, no crackles, with normal breath sounds and good air movement.  Cardiovascular: Murmur loudest in the mitral area no gallop no rub.   Abdominal:  Soft.  Bowel sounds are normal.  No distension and no tenderness.  PEG tube in place  Extremities:  No edema, no tenderness, and no deformity.  No red or swollen joints anywhere.  Strong pulses in all 4 extremities with no clubbing, no cyanosis, no edema.  Neurological: Patient is nonverbal she has visual tracking, left upper extremity moves slightly, right upper extremity is paralyzed.    Genitourinary: Indwelling catheter in place  Back: Large right paramedial ulcer with dressing.  ----------    DISCHARGE MEDICATIONS:     Discharge Medications      New Medications      Instructions Start Date   insulin detemir 100 UNIT/ML  injection  Commonly known as: LEVEMIR   25 Units, Subcutaneous, Nightly      losartan 50 MG tablet  Commonly known as: COZAAR   50 mg, Oral, Every 24 Hours Scheduled   Start Date: August 19, 2022     magic barrier cream   1 application, Topical, As Needed         Continue These Medications      Instructions Start Date   aspirin 81 MG EC tablet   81 mg, Oral, Daily      atorvastatin 40 MG tablet  Commonly known as: LIPITOR   40 mg, Oral, Nightly      docusate sodium 100 MG capsule  Commonly known as: COLACE   100 mg, Oral, 2 Times Daily      donepezil 10 MG tablet  Commonly known as: ARICEPT   10 mg, Oral, Nightly      isosorbide mononitrate 30 MG 24 hr tablet  Commonly known as: IMDUR   30 mg, Oral, Daily      levothyroxine 25 MCG tablet  Commonly known as: SYNTHROID, LEVOTHROID   25 mcg, Oral, Daily      rOPINIRole 0.5 MG tablet  Commonly known as: REQUIP   0.5 mg, Oral, Nightly, Take 1 hour before bedtime.      vitamin D3 125 MCG (5000 UT) capsule capsule   5,000 Units, Oral, Daily         Stop These Medications    amLODIPine 10 MG tablet  Commonly known as: NORVASC     BASAGLAR KWIKPEN 100 UNIT/ML injection pen     gabapentin 300 MG capsule  Commonly known as: NEURONTIN     Insulin Lispro 100 UNIT/ML injection  Commonly known as: humaLOG                  Additional Instructions for the Follow-ups that You Need to Schedule     Ambulatory Referral to Home Health   As directed      Face to Face Visit Date: 8/18/2022    Follow-up provider for Plan of Care?: I treated the patient in an acute care facility and will not continue treatment after discharge.    Follow-up provider: RANDY DONALD [[id number]]    Reason/Clinical Findings: Patient has CVA debilitated bedbound with large decubitus ulcer requiring wound care, she has a PEG tube.    Describe mobility limitations that make leaving home difficult: Patient is debilitated and bedbound needs company to leave the house    Nursing/Therapeutic Services Requested: Skilled  Nursing Physical Therapy    Skilled nursing orders: Medication education Ostomy instruction Wound care dressing/changes    PT orders: Therapeutic exercise Home safety assessment    Frequency: 1 Week 1         Discharge Follow-up with PCP   As directed       Currently Documented PCP:    Johnathan Ashraf MD    PCP Phone Number:    [phone]     Follow Up Details: Johnathan Ashraf MD (1 to 2 weeks posthospitalization follow-up)         Discharge Follow-up with Specified Provider: Dr. Ivory; 1 Week   As directed      To: Dr. Ivory    Follow Up: 1 Week    Follow Up Details: Decubitus ulcer         Referral to Wound Clinic   As directed         Follow-up Information     Johnathan Ashraf MD .    Specialty: Family Medicine  Why: Johnathan Ashraf MD (1 to 2 weeks posthospitalization follow-up)  Contact information:  [address] [address]  [phone]             [facility] .    Specialty: Wound Care  Contact information:  [address] [place] [id number] [phone]                            The ASCVD Risk score (Chiqui ELVIN Jr., et al., 2013) failed to calculate for the following reasons:    Cannot find a previous HDL lab    Cannot find a previous total cholesterol lab     Edwina Dodson MD  08/18/22  10:37 EDT    Please note that this discharge summary required more than 30 minutes to complete.

## 2022-08-18 NOTE — DISCHARGE INSTR - DIET
Tube Feeding Formula: Isosource 1.5 (Jevity 1.5) per G tube  Feeding Type Continuous  Goal rate (mL/hr) 55  H2O Flush Amount 25  H2O Flush freq Every 2 Hours          Dakins (full strength) (Dakins full strength) external solution 20 mL 20 mL 2 Times Daily and as needed if dressing becomes wet or dirty     Admin Instructions: Cleanse area with normal saline pack wound with gauze moistened with dakins solution and cover with dry dressing.      Class: Normal     Route: Topical       - Apply Venelex Every 12 Hours

## 2022-08-18 NOTE — CASE MANAGEMENT/SOCIAL WORK
Discharge Planning Assessment   [place]     Patient Name: Yecenia Botello  MRN: [id number]  Today's Date: 8/18/2022    Admit Date: 7/30/2022     Discharge Plan     Row Name 08/18/22 1243       Plan    Final Note Pt is being discharged home today. Pt's family has been notified and her daughter will provide transportation. SS received home health order. SS faxed clinicals including order to [facility]. SS to follow.    14:43: SS contacted [facility] per Nat and referral was received and RN called report. SS received order for low airloss/alternating pressure support surface. Pt's daughter states hospital bed came from [facility] in [place]. SS contacted [facility] [phone] per [place] and will fax clinicals including order. [facility] states airloss/alternating pressure support surface will have to be ordered. SS notified pt's daughter. No other needs identified.              Continued Care and Services - Admitted Since 7/30/2022     Home Medical Care Coordination complete.    Service Provider Request Status Selected Services Address Phone Fax Patient Preferred    [facility]   Selected Home Health Services [address] [address] [phone] --            Selected Continued Care - Prior Encounters Includes selections from prior encounters from 5/1/2022 to 8/18/2022    Discharged on 7/30/2022 Admission date: 7/22/2022 - Discharge disposition: Swing Bed    Destination     Service Provider Selected Services Address Phone Fax Patient Preferred     [facility]  Skilled Nursing [address] [place] [address] [place] [address] [phone] -- --                    Expected Discharge Date and Time     Expected Discharge Date Expected Discharge Time    Aug 18, 2022         CHANTELLE Shukla

## 2022-08-18 NOTE — DISCHARGE PLACEMENT REQUEST
"Jesika Aquino (71 y.o. Female)             Date of Birth   1950    Social Security Number       Address   [address] [place] [id number]    Home Phone   [phone]    MRN   [id number]       Mandaen   None    Marital Status                               Admission Date   22    Admission Type   Urgent    Admitting Provider   Nicholas Carrillo DO    Attending Provider   Edwina Dodson MD    Department, Room/Bed   [address], [address]344/       Discharge Date       Discharge Disposition   Home-Health Care [facility]    Discharge Destination                               Attending Provider: Edwina Dodson MD    Allergies: No Known Allergies    Isolation: None   Infection: None   Code Status: No CPR   Advance Care Planning Activity    Ht: 162.6 cm (64.02\")   Wt: 61.1 kg (134 lb 12.8 oz)    Admission Cmt: None   Principal Problem: Infected decubitus ulcer [L89.90,L08.9]                 Active Insurance as of 2022     Primary Coverage     Payor Plan Insurance Group Employer/Plan Group    ANTH MEDICARE REPLACEMENT ANTHEM MEDICARE ADVANTAGE [id number]     Payor Plan Address Payor Plan Phone Number Payor Plan Fax Number Effective Dates    [address] [phone]  3/1/2022 - None Entered    [facility] [id number]       Subscriber Name Subscriber Birth Date Member ID       JESIKA AQUINO 1950 [id number]                 Emergency Contacts      (Rel.) Home Phone Work Phone Mobile Phone    JESIKA KOCH (Daughter) [phone] --        [address] [address]  Dept. Phone:  [phone]  Dept. Fax:  [phone] Date Ordered: Aug 18, 2022         Patient:  Jesika Aquino MRN:  [id number]   [address]  [facility] [address] :  1950  SSN:    Phone: [phone] Sex:  F     Weight: 61.1 kg (134 lb 12.8 oz)         Ht Readings from Last 1 Encounters:   22 162.6 cm " "(64.02\")         Miscellaneous DME   (Order ID: [id number])    Diagnosis:  Pressure injury, unstageable, with infection (HCC) (L89.95,L08.9 [ICD-10-CM] 707.00,707.25 [ICD-9-CM])   Quantity:  1     Type of DME: low airloss/alternating pressure support surface  Length of Need (99 Months = Lifetime): 99 Months = Lifetime        Authorizing Provider's Phone: [phone]  Authorizing Provider:Edwina Dodson MD  Authorizing Provider's NPI: [id number]  Order Entered By: Edwina Dodson MD 8/18/2022  2:03 PM     Electronically signed by: Edwina Dodson MD 8/18/2022  2:03 PM            History & Physical      Nicholas Carrillo DO at 07/30/22 1230          H&P updated. The patient was examined and the following changes are noted:        Patient is a 71 y.o. female [facility] who was brought to the ER on 7/22 for worsening posterior decubitus ulcer.  Please see the admitting history and physical for further details.  Patient has a history significant for vascular dementia, nonverbal and oropharyngeal dysphagia status post PEG.  She is bedbound at baseline.  MRI noted ulceration with edema without abscess and was negative for osteomyelitis.  General surgery was consulted and patient underwent debridement on 7/24 of a 28 x 10.2 cm area noting exposed bone.  She has an extensive wound requiring packing and skilled wound care.  Wound culture noted E. coli.  She had a urine culture positive for pansensitive E. coli and Klebsiella.  Infectious disease was consulted and initially placed her on empiric antibiotics that were eventually de-escalated to Rocephin.  On 7/29, ID was concerned about a new murmur noted, repeat echo ruled out vegetation and blood cultures have remained negative.  Flagyl was added on 7/30 after Bacteroides noted on anaerobic culture from 7/23.     Given patient's critical illness myopathy and complicated wound care, it was felt she required further nursing care " and management.  In my opinion patient was an excellent candidate for LTAC given comorbidities and extensive wound however due to her insurance company opted against my medical recommendations despite not participating on her treatment team or ever evaluating the patient, LTAC was denied.  Her wounds were too advanced and complicated for skilled nursing and would be impossible for anyone at home to take care of her.  Since LTAC was denied by insurance, swing bed was the only option to provide her with the most appropriate care and assistance as possible.  She was accepted as a swing bed patient for continued rehab and sacral decubitus wound care.    Electronically signed by Nicholas Carrillo DO at 22 1230   Source Note          Hospitalist History and Physical        Patient Identification  Name: Yecenia Botello  Age/Sex: 71 y.o. female  :  1950        MRN: [id number]  Visit Number: [id number]  Admit Date: 2022   PCP: Provider, No Known          Chief complaint worsening sacral decubitus ulcer    History of Present Illness:  Patient is a 71 y.o. female with history of stroke with significant residual deficits, now non-ambulatory and minimally verbal, who presents with complaints of worsening sacral decubitus ulcer per family. She was reportedly admitted to [facility] in 2022 for MARLON and failure to thrive, during which time she developed a sacral decubitus ulcer for which she was sent home with silvadene dressings and wound care instructions. Her family has been treating with healing honey. However, her wound has gotten worse with increasing exudative drainage that is foul smelling. Unfortunately no family was present during my exam of the patient and I tried multiple times to reach the patient's daughter by phone but no one answered.    Review of Systems  Review of Systems   Unable to perform ROS: Patient nonverbal       History  No past medical history on file.  No past surgical  history on file.  No family history on file.   Unable to reach daughter by phone to discuss history     (Not in a hospital admission)    Allergies:  Patient has no known allergies. Unable to reach daughter by phone to discuss history    Objective     Vital Signs  Temp:  [97.7 °F (36.5 °C)-99.3 °F (37.4 °C)] 97.7 °F (36.5 °C)  Heart Rate:  [113] 113  Resp:  [16] 16  BP: (120-147)/(70-94) 128/78  Body mass index is 24.03 kg/m².    Physical Exam:  Physical Exam  Constitutional:       Comments: Frail appearing 71 year old female, no acute distress, mildly lethargic but opens eyes to voice. Contracted.    HENT:      Head: Normocephalic and atraumatic.      Right Ear: External ear normal.      Left Ear: External ear normal.      Nose: Nose normal.      Mouth/Throat:      Mouth: Mucous membranes are dry.      Pharynx: Oropharynx is clear.   Eyes:      Extraocular Movements: Extraocular movements intact.      Conjunctiva/sclera: Conjunctivae normal.      Pupils: Pupils are equal, round, and reactive to light.   Cardiovascular:      Rate and Rhythm: Regular rhythm. Tachycardia present.      Pulses: Normal pulses.      Heart sounds: Murmur (grade III/VI systolic murmur, best heard right 2nd intercostal space) heard.   Pulmonary:      Effort: Pulmonary effort is normal.      Breath sounds: Normal breath sounds. No wheezing or rales.   Abdominal:      General: Abdomen is flat. Bowel sounds are normal. There is no distension.      Palpations: Abdomen is soft.   Musculoskeletal:      Cervical back: Normal range of motion and neck supple.      Comments: Significantly limited ROM due to contractures in both arms and left leg   Skin:     General: Skin is warm.      Capillary Refill: Capillary refill takes 2 to 3 seconds.      Comments: Stage I decubitus lesion right heel. Stage IV decubitus ulcer on sacrum, 5 x 3 cm in diameter, deep (appx 2 cm or more) with muscle visualized, possibly some bone. Foul smelling purulent drainage  actively leaking out. Mild surrounding erythema of skin around ulcer.    Neurological:      Comments: Nonverbal during my exam. Contractures of both arms noted. Left leg also contracted. Right leg unbent but has passive range of motion on my exam.    Psychiatric:      Comments: Unable to assess due to nonverbal state           Results Review:       Lab Results:  Results from last 7 days   Lab Units 07/22/22  2352   WBC 10*3/mm3 26.95*   HEMOGLOBIN g/dL 10.7*   PLATELETS 10*3/mm3 541*     Results from last 7 days   Lab Units 07/22/22  2352   CRP mg/dL 18.07*     Results from last 7 days   Lab Units 07/22/22  2352   SODIUM mmol/L 128*   POTASSIUM mmol/L 5.4*   CHLORIDE mmol/L 95*   CO2 mmol/L 19.7*   BUN mg/dL 27*   CREATININE mg/dL 0.84   CALCIUM mg/dL 9.4   GLUCOSE mg/dL 257*         No results found for: HGBA1C  Results from last 7 days   Lab Units 07/22/22  2352   BILIRUBIN mg/dL 0.8   ALK PHOS U/L 155*   AST (SGOT) U/L 54*   ALT (SGPT) U/L 31                       I have reviewed the patient's laboratory results.    Imaging:  Imaging Results (Last 72 Hours)     Procedure Component Value Units Date/Time    CT Abdomen Pelvis Without Contrast [[id number]] Collected: 07/23/22 0300     Updated: 07/23/22 0302    Narrative:      CT Spine Lumbar WO, CT Abdomen Pelvis WO    INDICATION:    Worsening sacral decubitus ulcer.    TECHNIQUE:   CT of the lumbar spine and CT of the abdomen and pelvis without IV contrast. Coronal and sagittal reconstructions were obtained.  Radiation dose reduction techniques included automated exposure control or exposure modulation based on body size. Count of  known CT and cardiac nuc med studies performed in previous 12 months: 0.     COMPARISON:    None available.    FINDINGS:    LUMBAR SPINE:  No subluxation is seen in the lumbar spine on the sagittal images. There is mild lumbar levoscoliosis. There is no sacroiliac joint diastasis. There is no lumbar spine fracture. At the L5-S1 level,  there is a mild disc bulge slightly eccentric to the  right, and there is bilateral facet arthropathy. No significant central canal or foraminal stenosis is seen. At the L4-5 level, there is a broad-based disc osteophyte complex slightly eccentric to the left. There is bilateral facet arthropathy with  moderate left and mild-to-moderate right foraminal stenosis. There is moderate central stenosis. At the L3-4 level, there is a broad-based posterior disc bulge with facet arthropathy. There is mild narrowing of both foramina without central stenosis. At  the L2-3 level, there is a mild disc bulge with facet arthropathy. There is mild narrowing of the foramina with no central stenosis. At L1-2, there is a mild disc bulge with facet arthropathy. No central canal or foraminal stenosis. Paraspinal soft  tissues are normal.    ABDOMEN AND PELVIS:  There is streak artifact from the patient's arms, and there is motion degradation. There is some scarring/atelectasis in the lower lobes, and there are scattered granulomatous calcifications in both lung bases. There is atherosclerotic disease with no  aortic aneurysm. Gallbladder is surgically absent. No biliary obstruction. The unenhanced solid abdominal organs are grossly normal. No renal or ureteral stones are identified. There is no hydronephrosis. Tiny dependent stones are suspected in the  urinary bladder. The bladder is otherwise normal. Uterus is surgically absent.    A large amount of stool is noted in the rectal vault. Correlate clinically to exclude impaction. Moderate to large stool burden noted in the remainder of the colon. No evidence of acute colitis. The appendix is normal. There is no small bowel  obstruction. Gastrostomy tube is present in the body of the stomach. The stomach is otherwise normal. No definite adenopathy is identified. Anasarca is present.    There is a large ulcer overlying the sacrum with associated soft tissue gas and fat stranding. No  definite erosive changes are seen in the sacrum. However, the ulcer likely extends up to the tip of the coccyx, and as such, osteomyelitis is not excluded.  There appears to be a soft tissue tract to the left of midline overlying the sacrum. Additionally, to the right of midline, there is a fluid and gas collection below and lateral to the tip of the coccyx. This is difficult to measure accurately due to its  configuration relative to the scan plane. However, this measures at least 3.0 cm in depth and 5.9 cm in height by at least 5.5 cm in width.      Impression:        1. Sacral decubitus ulcer with fluid and gas collection noted in the right lower buttock and soft tissue gas and soft tissue changes noted to the left of midline overlying the sacrum as well. These abnormalities are in contact with the distal coccyx, and  as such, osteomyelitis is not excluded. No definite erosive changes are seen in the bone.  2. Large colonic stool burden, particularly in the rectal vault. Fecal impaction not excluded.  3. No renal or ureteral stones. No hydronephrosis.  4. Grossly normal small bowel and appendix. Well-positioned gastrostomy tube.  5. Cholecystectomy and hysterectomy.  6. No renal or ureteral stones. No hydronephrosis. However, multiple tiny bladder stones are noted.  7. Degenerative disease in the lumbar spine as described with no fracture or subluxation.    Signer Name: Johnathan Leon MD   Signed: 7/23/2022 3:00 AM   Workstation Name: [id number]    [facility]    CT Lumbar Spine Without Contrast [[id number]] Collected: 07/23/22 0300     Updated: 07/23/22 0302    Narrative:      CT Spine Lumbar WO, CT Abdomen Pelvis WO    INDICATION:    Worsening sacral decubitus ulcer.    TECHNIQUE:   CT of the lumbar spine and CT of the abdomen and pelvis without IV contrast. Coronal and sagittal reconstructions were obtained.  Radiation dose reduction techniques included automated exposure control or  exposure modulation based on body size. Count of  known CT and cardiac nuc med studies performed in previous 12 months: 0.     COMPARISON:    None available.    FINDINGS:    LUMBAR SPINE:  No subluxation is seen in the lumbar spine on the sagittal images. There is mild lumbar levoscoliosis. There is no sacroiliac joint diastasis. There is no lumbar spine fracture. At the L5-S1 level, there is a mild disc bulge slightly eccentric to the  right, and there is bilateral facet arthropathy. No significant central canal or foraminal stenosis is seen. At the L4-5 level, there is a broad-based disc osteophyte complex slightly eccentric to the left. There is bilateral facet arthropathy with  moderate left and mild-to-moderate right foraminal stenosis. There is moderate central stenosis. At the L3-4 level, there is a broad-based posterior disc bulge with facet arthropathy. There is mild narrowing of both foramina without central stenosis. At  the L2-3 level, there is a mild disc bulge with facet arthropathy. There is mild narrowing of the foramina with no central stenosis. At L1-2, there is a mild disc bulge with facet arthropathy. No central canal or foraminal stenosis. Paraspinal soft  tissues are normal.    ABDOMEN AND PELVIS:  There is streak artifact from the patient's arms, and there is motion degradation. There is some scarring/atelectasis in the lower lobes, and there are scattered granulomatous calcifications in both lung bases. There is atherosclerotic disease with no  aortic aneurysm. Gallbladder is surgically absent. No biliary obstruction. The unenhanced solid abdominal organs are grossly normal. No renal or ureteral stones are identified. There is no hydronephrosis. Tiny dependent stones are suspected in the  urinary bladder. The bladder is otherwise normal. Uterus is surgically absent.    A large amount of stool is noted in the rectal vault. Correlate clinically to exclude impaction. Moderate to large stool  burden noted in the remainder of the colon. No evidence of acute colitis. The appendix is normal. There is no small bowel  obstruction. Gastrostomy tube is present in the body of the stomach. The stomach is otherwise normal. No definite adenopathy is identified. Anasarca is present.    There is a large ulcer overlying the sacrum with associated soft tissue gas and fat stranding. No definite erosive changes are seen in the sacrum. However, the ulcer likely extends up to the tip of the coccyx, and as such, osteomyelitis is not excluded.  There appears to be a soft tissue tract to the left of midline overlying the sacrum. Additionally, to the right of midline, there is a fluid and gas collection below and lateral to the tip of the coccyx. This is difficult to measure accurately due to its  configuration relative to the scan plane. However, this measures at least 3.0 cm in depth and 5.9 cm in height by at least 5.5 cm in width.      Impression:        1. Sacral decubitus ulcer with fluid and gas collection noted in the right lower buttock and soft tissue gas and soft tissue changes noted to the left of midline overlying the sacrum as well. These abnormalities are in contact with the distal coccyx, and  as such, osteomyelitis is not excluded. No definite erosive changes are seen in the bone.  2. Large colonic stool burden, particularly in the rectal vault. Fecal impaction not excluded.  3. No renal or ureteral stones. No hydronephrosis.  4. Grossly normal small bowel and appendix. Well-positioned gastrostomy tube.  5. Cholecystectomy and hysterectomy.  6. No renal or ureteral stones. No hydronephrosis. However, multiple tiny bladder stones are noted.  7. Degenerative disease in the lumbar spine as described with no fracture or subluxation.    Signer Name: Johnathan Leon MD   Signed: 7/23/2022 3:00 AM   Workstation Name: [facility]    [facility]          I have personally reviewed the patient's  radiologic imaging.        EKG: ordered, results pending        Assessment & Plan     - Severe sepsis, present on admission with tachycardia, leukocytosis, CRP elevation and lactic acidosis, secondary to infected sacral decubitus ulcer with potential for osteomyelitis, along with UTI: admit to hospitalist service. Treat with IV vancomycin and zosyn. Continue to trend WBC, CRP, lactate. Follow up on urine and blood cultures collected in ED. Consult ID for antibiotic recommendations and general surgery for consideration of wound debridement.    - Type II Diabetes: based on hyperglycemia and A1c 7.2 (no history available at this time). Monitor accuchecks, cover with SSI. Review home regimen once reconciled by pharmacy.  - Hyponatremia, hyperkalemia: suspect secondary to dehydration. Repeat labs this morning to monitor response to IV fluid hydration. Check AM cortisol level.   - History of stroke with residual deficits, contractures, non-ambulatory, minimally verbal: supportive care. Review home meds once reconciled by pharmacy.   - Constipation: do not feel comfortable giving laxatives/stool softeners orally at this time. Monitor output closely, if no bowel movement moving forward, may need suppository or enema.    DVT Prophylaxis: SQ heparin    Estimated Length of Stay >2 midnights    I discussed the patient's findings, assessment and plan with nursing staff in the ED.     * patient is high risk due to severe sepsis, infected sacral decubitus ulcer, UTI, electrolyte disturbances, history of stroke with significant residual deficits, advanced age    Jose D Lassiter MD  22  04:39 EDT      Electronically signed by Jose D Lassiter MD at 22 0625             Jose D Lassiter MD at 22 0439          Hospitalist History and Physical        Patient Identification  Name: Yecenia Botello  Age/Sex: 71 y.o. female  :  1950        MRN: [id number]  Visit Number: [id number]  Admit Date:  7/22/2022   PCP: Provider, No Known          Chief complaint worsening sacral decubitus ulcer    History of Present Illness:  Patient is a 71 y.o. female with history of stroke with significant residual deficits, now non-ambulatory and minimally verbal, who presents with complaints of worsening sacral decubitus ulcer per family. She was reportedly admitted to [facility] in April 2022 for MARLON and failure to thrive, during which time she developed a sacral decubitus ulcer for which she was sent home with silvadene dressings and wound care instructions. Her family has been treating with healing honey. However, her wound has gotten worse with increasing exudative drainage that is foul smelling. Unfortunately no family was present during my exam of the patient and I tried multiple times to reach the patient's daughter by phone but no one answered.    Review of Systems  Review of Systems   Unable to perform ROS: Patient nonverbal       History  No past medical history on file.  No past surgical history on file.  No family history on file.   Unable to reach daughter by phone to discuss history     (Not in a hospital admission)    Allergies:  Patient has no known allergies. Unable to reach daughter by phone to discuss history    Objective     Vital Signs  Temp:  [97.7 °F (36.5 °C)-99.3 °F (37.4 °C)] 97.7 °F (36.5 °C)  Heart Rate:  [113] 113  Resp:  [16] 16  BP: (120-147)/(70-94) 128/78  Body mass index is 24.03 kg/m².    Physical Exam:  Physical Exam  Constitutional:       Comments: Frail appearing 71 year old female, no acute distress, mildly lethargic but opens eyes to voice. Contracted.    HENT:      Head: Normocephalic and atraumatic.      Right Ear: External ear normal.      Left Ear: External ear normal.      Nose: Nose normal.      Mouth/Throat:      Mouth: Mucous membranes are dry.      Pharynx: Oropharynx is clear.   Eyes:      Extraocular Movements: Extraocular movements intact.      Conjunctiva/sclera:  Conjunctivae normal.      Pupils: Pupils are equal, round, and reactive to light.   Cardiovascular:      Rate and Rhythm: Regular rhythm. Tachycardia present.      Pulses: Normal pulses.      Heart sounds: Murmur (grade III/VI systolic murmur, best heard right 2nd intercostal space) heard.   Pulmonary:      Effort: Pulmonary effort is normal.      Breath sounds: Normal breath sounds. No wheezing or rales.   Abdominal:      General: Abdomen is flat. Bowel sounds are normal. There is no distension.      Palpations: Abdomen is soft.   Musculoskeletal:      Cervical back: Normal range of motion and neck supple.      Comments: Significantly limited ROM due to contractures in both arms and left leg   Skin:     General: Skin is warm.      Capillary Refill: Capillary refill takes 2 to 3 seconds.      Comments: Stage I decubitus lesion right heel. Stage IV decubitus ulcer on sacrum, 5 x 3 cm in diameter, deep (appx 2 cm or more) with muscle visualized, possibly some bone. Foul smelling purulent drainage actively leaking out. Mild surrounding erythema of skin around ulcer.    Neurological:      Comments: Nonverbal during my exam. Contractures of both arms noted. Left leg also contracted. Right leg unbent but has passive range of motion on my exam.    Psychiatric:      Comments: Unable to assess due to nonverbal state           Results Review:       Lab Results:  Results from last 7 days   Lab Units 07/22/22  2352   WBC 10*3/mm3 26.95*   HEMOGLOBIN g/dL 10.7*   PLATELETS 10*3/mm3 541*     Results from last 7 days   Lab Units 07/22/22  2352   CRP mg/dL 18.07*     Results from last 7 days   Lab Units 07/22/22  2352   SODIUM mmol/L 128*   POTASSIUM mmol/L 5.4*   CHLORIDE mmol/L 95*   CO2 mmol/L 19.7*   BUN mg/dL 27*   CREATININE mg/dL 0.84   CALCIUM mg/dL 9.4   GLUCOSE mg/dL 257*         No results found for: HGBA1C  Results from last 7 days   Lab Units 07/22/22  2352   BILIRUBIN mg/dL 0.8   ALK PHOS U/L 155*   AST (SGOT) U/L 54*    ALT (SGPT) U/L 31                       I have reviewed the patient's laboratory results.    Imaging:  Imaging Results (Last 72 Hours)     Procedure Component Value Units Date/Time    CT Abdomen Pelvis Without Contrast [[id number]] Collected: 07/23/22 0300     Updated: 07/23/22 0302    Narrative:      CT Spine Lumbar WO, CT Abdomen Pelvis WO    INDICATION:    Worsening sacral decubitus ulcer.    TECHNIQUE:   CT of the lumbar spine and CT of the abdomen and pelvis without IV contrast. Coronal and sagittal reconstructions were obtained.  Radiation dose reduction techniques included automated exposure control or exposure modulation based on body size. Count of  known CT and cardiac nuc med studies performed in previous 12 months: 0.     COMPARISON:    None available.    FINDINGS:    LUMBAR SPINE:  No subluxation is seen in the lumbar spine on the sagittal images. There is mild lumbar levoscoliosis. There is no sacroiliac joint diastasis. There is no lumbar spine fracture. At the L5-S1 level, there is a mild disc bulge slightly eccentric to the  right, and there is bilateral facet arthropathy. No significant central canal or foraminal stenosis is seen. At the L4-5 level, there is a broad-based disc osteophyte complex slightly eccentric to the left. There is bilateral facet arthropathy with  moderate left and mild-to-moderate right foraminal stenosis. There is moderate central stenosis. At the L3-4 level, there is a broad-based posterior disc bulge with facet arthropathy. There is mild narrowing of both foramina without central stenosis. At  the L2-3 level, there is a mild disc bulge with facet arthropathy. There is mild narrowing of the foramina with no central stenosis. At L1-2, there is a mild disc bulge with facet arthropathy. No central canal or foraminal stenosis. Paraspinal soft  tissues are normal.    ABDOMEN AND PELVIS:  There is streak artifact from the patient's arms, and there is motion degradation. There is  some scarring/atelectasis in the lower lobes, and there are scattered granulomatous calcifications in both lung bases. There is atherosclerotic disease with no  aortic aneurysm. Gallbladder is surgically absent. No biliary obstruction. The unenhanced solid abdominal organs are grossly normal. No renal or ureteral stones are identified. There is no hydronephrosis. Tiny dependent stones are suspected in the  urinary bladder. The bladder is otherwise normal. Uterus is surgically absent.    A large amount of stool is noted in the rectal vault. Correlate clinically to exclude impaction. Moderate to large stool burden noted in the remainder of the colon. No evidence of acute colitis. The appendix is normal. There is no small bowel  obstruction. Gastrostomy tube is present in the body of the stomach. The stomach is otherwise normal. No definite adenopathy is identified. Anasarca is present.    There is a large ulcer overlying the sacrum with associated soft tissue gas and fat stranding. No definite erosive changes are seen in the sacrum. However, the ulcer likely extends up to the tip of the coccyx, and as such, osteomyelitis is not excluded.  There appears to be a soft tissue tract to the left of midline overlying the sacrum. Additionally, to the right of midline, there is a fluid and gas collection below and lateral to the tip of the coccyx. This is difficult to measure accurately due to its  configuration relative to the scan plane. However, this measures at least 3.0 cm in depth and 5.9 cm in height by at least 5.5 cm in width.      Impression:        1. Sacral decubitus ulcer with fluid and gas collection noted in the right lower buttock and soft tissue gas and soft tissue changes noted to the left of midline overlying the sacrum as well. These abnormalities are in contact with the distal coccyx, and  as such, osteomyelitis is not excluded. No definite erosive changes are seen in the bone.  2. Large colonic stool  burden, particularly in the rectal vault. Fecal impaction not excluded.  3. No renal or ureteral stones. No hydronephrosis.  4. Grossly normal small bowel and appendix. Well-positioned gastrostomy tube.  5. Cholecystectomy and hysterectomy.  6. No renal or ureteral stones. No hydronephrosis. However, multiple tiny bladder stones are noted.  7. Degenerative disease in the lumbar spine as described with no fracture or subluxation.    Signer Name: Johnathan Leon MD   Signed: 7/23/2022 3:00 AM   Workstation Name: [id number]    [facility]    CT Lumbar Spine Without Contrast [[id number]] Collected: 07/23/22 0300     Updated: 07/23/22 0302    Narrative:      CT Spine Lumbar WO, CT Abdomen Pelvis WO    INDICATION:    Worsening sacral decubitus ulcer.    TECHNIQUE:   CT of the lumbar spine and CT of the abdomen and pelvis without IV contrast. Coronal and sagittal reconstructions were obtained.  Radiation dose reduction techniques included automated exposure control or exposure modulation based on body size. Count of  known CT and cardiac nuc med studies performed in previous 12 months: 0.     COMPARISON:    None available.    FINDINGS:    LUMBAR SPINE:  No subluxation is seen in the lumbar spine on the sagittal images. There is mild lumbar levoscoliosis. There is no sacroiliac joint diastasis. There is no lumbar spine fracture. At the L5-S1 level, there is a mild disc bulge slightly eccentric to the  right, and there is bilateral facet arthropathy. No significant central canal or foraminal stenosis is seen. At the L4-5 level, there is a broad-based disc osteophyte complex slightly eccentric to the left. There is bilateral facet arthropathy with  moderate left and mild-to-moderate right foraminal stenosis. There is moderate central stenosis. At the L3-4 level, there is a broad-based posterior disc bulge with facet arthropathy. There is mild narrowing of both foramina without central stenosis. At  the  L2-3 level, there is a mild disc bulge with facet arthropathy. There is mild narrowing of the foramina with no central stenosis. At L1-2, there is a mild disc bulge with facet arthropathy. No central canal or foraminal stenosis. Paraspinal soft  tissues are normal.    ABDOMEN AND PELVIS:  There is streak artifact from the patient's arms, and there is motion degradation. There is some scarring/atelectasis in the lower lobes, and there are scattered granulomatous calcifications in both lung bases. There is atherosclerotic disease with no  aortic aneurysm. Gallbladder is surgically absent. No biliary obstruction. The unenhanced solid abdominal organs are grossly normal. No renal or ureteral stones are identified. There is no hydronephrosis. Tiny dependent stones are suspected in the  urinary bladder. The bladder is otherwise normal. Uterus is surgically absent.    A large amount of stool is noted in the rectal vault. Correlate clinically to exclude impaction. Moderate to large stool burden noted in the remainder of the colon. No evidence of acute colitis. The appendix is normal. There is no small bowel  obstruction. Gastrostomy tube is present in the body of the stomach. The stomach is otherwise normal. No definite adenopathy is identified. Anasarca is present.    There is a large ulcer overlying the sacrum with associated soft tissue gas and fat stranding. No definite erosive changes are seen in the sacrum. However, the ulcer likely extends up to the tip of the coccyx, and as such, osteomyelitis is not excluded.  There appears to be a soft tissue tract to the left of midline overlying the sacrum. Additionally, to the right of midline, there is a fluid and gas collection below and lateral to the tip of the coccyx. This is difficult to measure accurately due to its  configuration relative to the scan plane. However, this measures at least 3.0 cm in depth and 5.9 cm in height by at least 5.5 cm in width.      Impression:         1. Sacral decubitus ulcer with fluid and gas collection noted in the right lower buttock and soft tissue gas and soft tissue changes noted to the left of midline overlying the sacrum as well. These abnormalities are in contact with the distal coccyx, and  as such, osteomyelitis is not excluded. No definite erosive changes are seen in the bone.  2. Large colonic stool burden, particularly in the rectal vault. Fecal impaction not excluded.  3. No renal or ureteral stones. No hydronephrosis.  4. Grossly normal small bowel and appendix. Well-positioned gastrostomy tube.  5. Cholecystectomy and hysterectomy.  6. No renal or ureteral stones. No hydronephrosis. However, multiple tiny bladder stones are noted.  7. Degenerative disease in the lumbar spine as described with no fracture or subluxation.    Signer Name: Johnathan Leon MD   Signed: 7/23/2022 3:00 AM   Workstation Name: [id number]    [facility] [place] [facility]          I have personally reviewed the patient's radiologic imaging.        EKG: ordered, results pending        Assessment & Plan     - Severe sepsis, present on admission with tachycardia, leukocytosis, CRP elevation and lactic acidosis, secondary to infected sacral decubitus ulcer with potential for osteomyelitis, along with UTI: admit to hospitalist service. Treat with IV vancomycin and zosyn. Continue to trend WBC, CRP, lactate. Follow up on urine and blood cultures collected in ED. Consult ID for antibiotic recommendations and general surgery for consideration of wound debridement.    - Type II Diabetes: based on hyperglycemia and A1c 7.2 (no history available at this time). Monitor accuchecks, cover with SSI. Review home regimen once reconciled by pharmacy.  - Hyponatremia, hyperkalemia: suspect secondary to dehydration. Repeat labs this morning to monitor response to IV fluid hydration. Check AM cortisol level.   - History of stroke with residual deficits, contractures,  non-ambulatory, minimally verbal: supportive care. Review home meds once reconciled by pharmacy.   - Constipation: do not feel comfortable giving laxatives/stool softeners orally at this time. Monitor output closely, if no bowel movement moving forward, may need suppository or enema.    DVT Prophylaxis: SQ heparin    Estimated Length of Stay >2 midnights    I discussed the patient's findings, assessment and plan with nursing staff in the ED.     * patient is high risk due to severe sepsis, infected sacral decubitus ulcer, UTI, electrolyte disturbances, history of stroke with significant residual deficits, advanced age    Jose D Lassiter MD  07/23/22  04:39 EDT      Electronically signed by Jose D Lassiter MD at 07/23/22 0625          Operative/Procedure Notes (most recent note)      Daja Ivory MD at 08/04/22 1427        Debridement sacral wound with partial secondary closure    Pre-op: Sacral wound    Post-op:  Same    Surgeon:  Daja vIory M.D., F.A.C.S.    Assistant:  None    Anesthesia:  general      Indications: Large sacral wound which tunneled from the sacrum towards the greater trochanter.  Patient has been receiving wound care and the wound is granulating well.       Procedure Details   After obtaining informed consent and receiving preoperative antibiotics and with venous compression boots in place, the patient was taken to the operating room and placed under anesthesia.  There was a large opening at the sacrum with a smaller opening made gluteal with a V shaped incision.  From the V-shaped incision to the most lateral extension there was a Penrose drain.  The Penrose drain was removed.  All tissue was inspected and was granulating well.  There was a 4 x 2 cm necrotic area under the sacral wound which was excisionally debrided.  In the central open area #1 Vicryl sutures were used to close the deep dead space and the skin was then closed with interrupted 2-0 nylon sutures.  This was  over an 8 cm area.  The most lateral wound opening which was granulating well was packed with 2 inch Nain.  The sacral wound was then secondarily closed along the medial aspect.  4-1/2 cm along the right lateral aspect were closed with deep sutures of #1 Vicryl from Kwabena's fascia to the muscle.  Over this 4-1/2 cm area the skin was closed with 2-0 nylon sutures.  The remainder of the open wound was packed with wet-to-dry Nain and Kerlix dressings were placed.  Patient tolerated the procedure well was taken to the recovery room in stable condition    Findings:      Estimated Blood Loss:  Minimal    Blood Administered: none           Drains: none           Specimens: None     Grafts and Implants: No       Complications:  none           Disposition: PACU - hemodynamically stable.           Condition: stable        Electronically signed by Daja Ivory MD at 22 1432          Physician Progress Notes (most recent note)      Edwina Dodson MD at 22 1120              [facility] PROGRESS NOTE     Patient Identification:  Name:  Yecenia Botello  Age:  71 y.o.  Sex:  female  :  1950  MRN:  [id number]  Visit Number:  [id number]  Primary Care Provider:  Johnathan Ashraf MD    Length of stay:  17    Subjective: Patient seen and examined, she is awake, she is nonverbal, she has visual tracking.  Noted nocturnal desaturation, also reported crackles on her chest on exam.  When I examined the patient her nasal cannula is not on her nostril.  X-ray no obvious pneumonia no infiltrate.  Does not appear to be volume overloaded.  She does appear slightly edematous.    Chief Complaint: Nocturnal desaturate, large sacral ulcer  ----------------------------------------------------------------------------------------------------------------------  Current Hospital Meds:  aspirin, 81 mg, Per PEG Tube, Daily  atorvastatin, 40 mg, Per PEG Tube, Nightly  Dakins (full strength), 20 mL,  Topical, Q12H  donepezil, 10 mg, Per PEG Tube, Nightly  heparin (porcine), 5,000 Units, Subcutaneous, Q12H  Insulin Aspart, 0-9 Units, Subcutaneous, TID AC  insulin detemir, 25 Units, Subcutaneous, Nightly  isosorbide mononitrate, 30 mg, Oral, Q24H  levothyroxine, 25 mcg, Per PEG Tube, QAM  losartan, 50 mg, Per PEG Tube, Q24H  rOPINIRole, 0.5 mg, Per PEG Tube, Nightly  sodium chloride, 10 mL, Intravenous, Q12H  vitamin D3, 5,000 Units, Per PEG Tube, Daily         ----------------------------------------------------------------------------------------------------------------------  Vital Signs:  Temp:  [98.3 °F (36.8 °C)-98.7 °F (37.1 °C)] 98.3 °F (36.8 °C)  Heart Rate:  [75-79] 75  Resp:  [18] 18  BP: (130-148)/(72-73) 148/73       Tele:       08/14/22  0500 08/15/22  0500 08/16/22  0500   Weight: 61.8 kg (136 lb 3.2 oz) 62.6 kg (137 lb 14.4 oz) 62.6 kg (137 lb 14.4 oz)     Body mass index is 23.66 kg/m².    Intake/Output Summary (Last 24 hours) at 8/16/2022 1120  Last data filed at 8/16/2022 0543  Gross per 24 hour   Intake 2560 ml   Output --   Net 2560 ml     NPO Diet NPO Type: Strict NPO  ----------------------------------------------------------------------------------------------------------------------  Physical exam:  General: awake, alert, does not communicate, she has visual tracking, appears comfortable.    No respiratory distress.    Skin:  Skin is warm and dry. No rash noted. No pallor.    HENT:  Head:  Normocephalic and atraumatic.  Mouth:  Moist mucous membranes.    Eyes:  Conjunctivae and EOM are normal.  Pupils are equal, round, and reactive to light.  No scleral icterus.    Neck:  Neck supple.  No JVD present.    She has hepatojugular reflux, no bruit  Pulmonary/Chest:  No respiratory distress, no wheezes, no crackles, with normal breath sounds and good air movement.  Cardiovascular: Faint murmur of mitral area, no gallop no rub regular rhythm.  Abdominal:  Soft.  Bowel sounds are normal.  No  distension and no tenderness.   Extremities:  No edema, no tenderness, and no deformity.  No red or swollen joints anywhere.  Strong pulses in all 4 extremities with no clubbing, no cyanosis, no edema.  Neurological:  Motor strength equal no obvious deficit, sensory grossly intact.   No cranial nerve deficit.  No tongue deviation.  No facial droop.  No slurred speech.    Genitourinary: No Raines catheter  Back: Right paramedial ulcer with dressing  ----------------------------------------------------------------------------------------------------------------------  ----------------------------------------------------------------------------------------------------------------------      Results from last 7 days   Lab Units 08/13/22 0533 08/12/22  0638 08/11/22  0740   WBC 10*3/mm3 7.65 6.90 6.65   HEMOGLOBIN g/dL 8.7* 8.1* 8.0*   HEMATOCRIT % 27.8* 25.5* 26.2*   MCV fL 97.5* 97.3* 99.2*   MCHC g/dL 31.3* 31.8 30.5*   PLATELETS 10*3/mm3 280 313 290     Results from last 7 days   Lab Units 08/16/22  0220   PH, ARTERIAL pH units 7.496*   PO2 ART mm Hg 67.0*   PCO2, ARTERIAL mm Hg 36.0   HCO3 ART mmol/L 27.8*     Results from last 7 days   Lab Units 08/13/22 0533 08/12/22  0638 08/11/22  0740   SODIUM mmol/L 135* 135* 134*   POTASSIUM mmol/L 4.4 4.3 4.4   CHLORIDE mmol/L 102 102 104   CO2 mmol/L 22.9 23.8 23.0   BUN mg/dL 14 16 14   CREATININE mg/dL 0.43* 0.43* 0.37*   CALCIUM mg/dL 8.4* 8.2* 8.2*   GLUCOSE mg/dL 164* 238* 98   Estimated Creatinine Clearance: 120.3 mL/min (A) (by C-G formula based on SCr of 0.43 mg/dL (L)).    No results found for: AMMONIA      No results found for: BLOODCX  No results found for: URINECX  No results found for: WOUNDCX  No results found for: STOOLCX    I have personally looked at the labs and they are summarized above.  ----------------------------------------------------------------------------------------------------------------------  Imaging Results (Last 24 Hours)     Procedure  Component Value Units Date/Time    XR Chest 1 View [[id number]] Collected: 22     Updated: 22    Narrative:      CR Chest 1 Vw    INDICATION:   Hypoxia with labored breathing     COMPARISON:    2022    FINDINGS:  Portable AP view(s) of the chest.        Since the previous examination the heart, lungs and mediastinum are unchanged. The aorta is tortuous. No new infiltrates are seen. Vascular markings are normal. No effusions are seen.       Impression:      No new infiltrates are noted since the previous examination.    Signer Name: Johnathan Herbert MD   Signed: 2022 2:42 AM   Workstation Name: [id number]    [facility]        ----------------------------------------------------------------------------------------------------------------------  Assessment and Plan:  -Large sacral decubiti present on admission status postdebridement, status post partial suture  -History of CVA nonverbal, status post PEG tube placement  -Diabetes type 2 with hyperglycemia  -Nocturnal hypoxia  -History of essential hypertension  -History of hypothyroid    Pulmonary toilet, oropharyngeal suctioning every shift, proper placement of oxygen supplementation, empiric dose of Lasix x1, aspiration precaution.  Continue Accu-Chek and sliding scale, wound care.      Disposition swing bed, family wants to bring her home once discharged.      Edwina Dodson MD  22  11:20 EDT    Electronically signed by Edwina Dodson MD at 22 1138          Consult Notes (most recent note)      Najma Mckeon APRN at 22 1720            Consult Note     Patient Identification:  Name:  Yecenia Botello  Age:  71 y.o.  Sex:  female  :  1950  MRN:  [id number]   Visit Number:  [id number]  Primary Care Physician:  Johnathan Ashraf MD     Subjective     Chief complaint:     Pressure injury     History of presenting illness:     Patient is a 71 y.o. female with history of  stroke with significant residual deficits, now non-ambulatory and minimally verbal, who presents to ED on 07/22/2022 with complaints of worsening sacral decubitus ulcer per family. She was reportedly admitted to [facility] in April 2022 for MARLON and failure to thrive, during which time she developed a sacral decubitus ulcer for which she was sent home with silvadene dressings and wound care instructions. Her family has been treating with  honey. However, her wound has gotten worse with increasing exudative drainage that is foul smelling. She had area debrided on 07/23/2022 by Dr. Ivory. Penrose drain placed to right gluteal. Please note history obtained from chart review and NATIVIDAD Hill as patient is unable to update HPI.    8/8/22  Resting in bed. Not communicative. NAD noted. Had recent extensive surgical debridement in OR. Tolerating treatments.    08/16/2022: Patient seen resting in bed. Unable to update HPI. Jessica HENSON present during exam. Patient had right lateral aspect of wound closed by Dr. Ivory on 08/14/2022. Lateral aspect sutures intake without evidence of dehiscene, or cellulitis. Small area of induration noted around suture line. Vital signs stable, afebrile. No new issues or concerns reported. Vital signs stable, afebrile. Plans for patient to return home with family.     08/17/2022: Patient seen resting in bed. NATIVIDAD Newton present during exam. Wounds overall stable. Left lower leg swelling has decreased. Denies any new issues or concerns. Vital signs stable, afebrile.   ---------------------------------------------------------------------------------------------------------------------   Review of Systems:  Review of Systems   Unable to perform ROS: Patient nonverbal      ---------------------------------------------------------------------------------------------------------------------   Past Medical History:   Diagnosis Date   • Stroke (cerebrum) (HCC)      Past Surgical History:   Procedure  Laterality Date   • DEBRIDEMENT OF ISCHIAL ULCER/BUTTOCKS WOUND N/A 7/23/2022    Procedure: DEBRIDEMENT OF ISCHIAL ULCER/BUTTOCKS WOUND;  Surgeon: Daja Ivory MD;  Location: [facility] OR;  Service: General;  Laterality: N/A;   • DEBRIDEMENT OF ISCHIAL ULCER/BUTTOCKS WOUND N/A 8/4/2022    Procedure: DEBRIDEMENT OF ISCHIAL ULCER/BUTTOCKS WOUND;  Surgeon: Daja Ivory MD;  Location: [facility] OR;  Service: General;  Laterality: N/A;     History reviewed. No pertinent family history.  Social History     Socioeconomic History   • Marital status:    Tobacco Use   • Smoking status: Never Smoker   Vaping Use   • Vaping Use: Unknown   Substance and Sexual Activity   • Alcohol use: Defer   • Drug use: Defer   • Sexual activity: Defer     ---------------------------------------------------------------------------------------------------------------------   Allergies:  Patient has no known allergies.  ---------------------------------------------------------------------------------------------------------------------   Medications below are reported home medications pulling from within the system; at this time, these medications have not been reconciled unless otherwise specified and are in the verification process for further verifcation as current home medications.    Prior to Admission Medications     Prescriptions Last Dose Informant Patient Reported? Taking?    amLODIPine (NORVASC) 10 MG tablet Unknown Child Yes No    Take 10 mg by mouth Daily.    aspirin 81 MG EC tablet Unknown Child Yes No    Take 81 mg by mouth Daily.    atorvastatin (LIPITOR) 40 MG tablet Unknown Child Yes No    Take 40 mg by mouth Every Night.    docusate sodium (COLACE) 100 MG capsule Unknown Child Yes No    Take 100 mg by mouth 2 (Two) Times a Day.    donepezil (ARICEPT) 10 MG tablet Unknown Child Yes No    Take 10 mg by mouth Every Night.    gabapentin (NEURONTIN) 300 MG capsule Unknown Child Yes No    Take 300 mg by mouth 2 (Two)  Times a Day.    Insulin Glargine (BASAGLAR KWIKPEN) 100 UNIT/ML injection pen Unknown Child Yes No    Inject 30 Units under the skin into the appropriate area as directed Every Night.    Insulin Lispro (humaLOG) 100 UNIT/ML injection Unknown Child Yes No    Inject 6 Units under the skin into the appropriate area as directed 3 (Three) Times a Day Before Meals.    isosorbide mononitrate (IMDUR) 30 MG 24 hr tablet Unknown Child Yes No    Take 30 mg by mouth Daily.    levothyroxine (SYNTHROID, LEVOTHROID) 25 MCG tablet Unknown Child Yes No    Take 25 mcg by mouth Daily.    rOPINIRole (REQUIP) 0.5 MG tablet Unknown Child Yes No    Take 0.5 mg by mouth Every Night. Take 1 hour before bedtime.    vitamin D3 125 MCG (5000 UT) capsule capsule Unknown Child Yes No    Take 5,000 Units by mouth Daily.        ---------------------------------------------------------------------------------------------------------------------    Objective     Hospital Scheduled Meds:  aspirin, 81 mg, Per PEG Tube, Daily  atorvastatin, 40 mg, Per PEG Tube, Nightly  Dakins (full strength), 20 mL, Topical, Q12H  donepezil, 10 mg, Per PEG Tube, Nightly  heparin (porcine), 5,000 Units, Subcutaneous, Q12H  Insulin Aspart, 0-9 Units, Subcutaneous, TID AC  insulin detemir, 25 Units, Subcutaneous, Nightly  isosorbide mononitrate, 30 mg, Oral, Q24H  levothyroxine, 25 mcg, Per PEG Tube, QAM  losartan, 50 mg, Per PEG Tube, Q24H  rOPINIRole, 0.5 mg, Per PEG Tube, Nightly  sodium chloride, 10 mL, Intravenous, Q12H  vitamin D3, 5,000 Units, Per PEG Tube, Daily           Current listed hospital scheduled medications may not yet reflect those currently placed in orders that are signed and held, awaiting patient's arrival to floor/unit.    ---------------------------------------------------------------------------------------------------------------------   Vital Signs:  Temp:  [97.9 °F (36.6 °C)-99 °F (37.2 °C)] 97.9 °F (36.6 °C)  Heart Rate:  [74-89] 74  Resp:   [16-18] 16  BP: (116-154)/(55-66) 154/55  Mean Arterial Pressure (Non-Invasive) for the past 24 hrs (Last 3 readings):   Noninvasive MAP (mmHg)   08/17/22 0619 125     SpO2 Percentage    08/17/22 0300 08/17/22 0619 08/17/22 0734   SpO2: 94% 95% 97%     SpO2:  [94 %-99 %] 97 %  on   ;   Device (Oxygen Therapy): room air    Body mass index is 23.42 kg/m².  Wt Readings from Last 3 Encounters:   08/17/22 61.9 kg (136 lb 8 oz)   07/30/22 61.5 kg (135 lb 8 oz)   02/26/15 95.7 kg (211 lb)       ---------------------------------------------------------------------------------------------------------------------   Physical Exam:  Physical Exam  Constitutional:       General: She is not in acute distress.     Appearance: She is not toxic-appearing.   HENT:      Head: Normocephalic.   Cardiovascular:      Rate and Rhythm: Normal rate and regular rhythm.   Pulmonary:      Effort: Pulmonary effort is normal. No respiratory distress.   Abdominal:      General: There is no distension.   Musculoskeletal:      Cervical back: Normal range of motion and neck supple.   Skin:     General: Skin is warm and dry.      Comments: Large pressure injury in the sacral area now extending to the buttocks post sharp debridement. Base appears essentially clean wit no purulence or malodor noted. Sutures noted to right buttock. Medial right buttock sutures with scattered areas that are dehisced. Lateral sutures intact, induration noted to suture line.   Neurological:      Mental Status: She is alert.       Assessment & Plan      Recommend routine turning and pressure re-distribution. Turn q 2 hours while in bed, every 15 minutes if in a chair. Float heels. Recommend adequate hydration, along with protein and vitamin intake. Open wounds can serve as a nidus for local and systemic infection. Needs monitoring. Recommend low airloss/alternating pressure support surface.    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 Recommend saline moistened  gauze BID.  Consider a wound vac to remain area.  Diabetes   -Recommend adequate glycemic control to help promote wound healing       Recommend follow-up in outpatient wound clinic once stable for discharge.    GOLDIE Wang  [facility]  2022  1720  Consults      Electronically signed by Najma Mckeon APRN at 22 1721            Discharge Summary      Edwina Dodson MD at 22 1022              [facility] HOSPITALIST MEDICINE DISCHARGE SUMMARY    Patient Identification:  Name:  Yecenia Botello  Age:  71 y.o.  Sex:  female  :  1950  MRN:  [id number]  Visit Number:  [id number]    Date of Admission: 2022  Date of Discharge: 2022  DISCHARGE DISPOSITION   Stable  PCP: Johnathan Ashraf MD    DISCHARGE DIAGNOSIS : Very large pressure ulcer of the sacrum present on admission with no osteomyelitis on work-up, treated with aggressive wound care in debridement, chronic debility, history of CVA nonverbal, history of PEG tube placement, severe sepsis present on admission secondary to infected decubiti, diabetes type 2 fairly controlled, constipation resolved.     HOSPITAL COURSE  For detailed history and physical exam I would refer you to the admission note was done on 2022, patient was brought in by family due to increased drainage and worsening of the ulcer at the sacral area, on admission cultures were performed, blood did not grow any bacteria, wound culture grew E. coli and Klebsiella species pansensitive.  MRI reviewed did not show any obvious abscess or osteomyelitis.  The size of the ulcer was at least 7 x 28 cm in size.  General surgery and wound care service was consulted patient underwent at least 2 surgical debridements and partial closure of the ulcer.  Penrose drain was also temporary placed.  Infectious disease was consulted and  antibiotic was tailored based on the culture findings.  She completed her antibiotic treatment, her wound has improved, discussion with family importance of close monitoring and wound care, recommended temporary skilled nursing home facility, family declined.  Rest of her stay was uneventful, she did have episodes of nocturnal hypoxia, she was noted to have poor handling of her oropharyngeal secretions and was addressed with suctioning with good results.  She also appeared to have mild hypervolemia improved with Lasix.  Rest of her stay was uneventful, frequent change of dressing, change of position side to side to relieve the pressure of the ulcer was done.  Plan is to discharge home today, she will continue wound care, follow-up with Dr. Ivory as well as outpatient wound care clinic.  Continue aspiration precaution and oropharyngeal care.     To promote healing of the ulcer and minimize moisture, Raines catheter was placed.      VITAL SIGNS:      08/16/22  0500 08/17/22  0500 08/18/22  0500   Weight: 62.6 kg (137 lb 14.4 oz) 61.9 kg (136 lb 8 oz) 61.1 kg (134 lb 12.8 oz)     Body mass index is 23.13 kg/m².  Vitals:    08/18/22 0738   BP:    Pulse:    Resp:    Temp:    SpO2: 96%     PHYSICAL EXAM:  General: She has visual tracking she does not communicate, comfortable, in bed,  No respiratory distress.    Skin:  Skin is warm and dry. No rash noted. No pallor.    HENT:  Head:  Normocephalic and atraumatic.  Mouth:  Moist mucous membranes.    Eyes:  Conjunctivae and EOM are normal.  Pupils are equal, round, and reactive to light.  No scleral icterus.    Neck:  Neck supple.  No JVD present.    No bruit no hepatojugular reflux  Pulmonary/Chest:  No respiratory distress, no wheezes, no crackles, with normal breath sounds and good air movement.  Cardiovascular: Murmur loudest in the mitral area no gallop no rub.   Abdominal:  Soft.  Bowel sounds are normal.  No distension and no tenderness.  PEG tube in  place  Extremities:  No edema, no tenderness, and no deformity.  No red or swollen joints anywhere.  Strong pulses in all 4 extremities with no clubbing, no cyanosis, no edema.  Neurological: Patient is nonverbal she has visual tracking, left upper extremity moves slightly, right upper extremity is paralyzed.    Genitourinary: Indwelling catheter in place  Back: Large right paramedial ulcer with dressing.  ----------    DISCHARGE MEDICATIONS:     Discharge Medications      New Medications      Instructions Start Date   insulin detemir 100 UNIT/ML injection  Commonly known as: LEVEMIR   25 Units, Subcutaneous, Nightly      losartan 50 MG tablet  Commonly known as: COZAAR   50 mg, Oral, Every 24 Hours Scheduled   Start Date: August 19, 2022     magic barrier cream   1 application, Topical, As Needed         Continue These Medications      Instructions Start Date   aspirin 81 MG EC tablet   81 mg, Oral, Daily      atorvastatin 40 MG tablet  Commonly known as: LIPITOR   40 mg, Oral, Nightly      docusate sodium 100 MG capsule  Commonly known as: COLACE   100 mg, Oral, 2 Times Daily      donepezil 10 MG tablet  Commonly known as: ARICEPT   10 mg, Oral, Nightly      isosorbide mononitrate 30 MG 24 hr tablet  Commonly known as: IMDUR   30 mg, Oral, Daily      levothyroxine 25 MCG tablet  Commonly known as: SYNTHROID, LEVOTHROID   25 mcg, Oral, Daily      rOPINIRole 0.5 MG tablet  Commonly known as: REQUIP   0.5 mg, Oral, Nightly, Take 1 hour before bedtime.      vitamin D3 125 MCG (5000 UT) capsule capsule   5,000 Units, Oral, Daily         Stop These Medications    amLODIPine 10 MG tablet  Commonly known as: NORVASC     BASAGLAR KWIKPEN 100 UNIT/ML injection pen     gabapentin 300 MG capsule  Commonly known as: NEURONTIN     Insulin Lispro 100 UNIT/ML injection  Commonly known as: humaLOG                  Additional Instructions for the Follow-ups that You Need to Schedule     Ambulatory Referral to Home Health   As  directed      Face to Face Visit Date: 8/18/2022    Follow-up provider for Plan of Care?: I treated the patient in an acute care facility and will not continue treatment after discharge.    Follow-up provider: JOHNATHAN DONALD [[id number]]    Reason/Clinical Findings: Patient has CVA debilitated bedbound with large decubitus ulcer requiring wound care, she has a PEG tube.    Describe mobility limitations that make leaving home difficult: Patient is debilitated and bedbound needs company to leave the house    Nursing/Therapeutic Services Requested: Skilled Nursing Physical Therapy    Skilled nursing orders: Medication education Ostomy instruction Wound care dressing/changes    PT orders: Therapeutic exercise Home safety assessment    Frequency: 1 Week 1         Discharge Follow-up with PCP   As directed       Currently Documented PCP:    Johnathan Donald MD    PCP Phone Number:    [phone]     Follow Up Details: Johnathan Donald MD (1 to 2 weeks posthospitalization follow-up)         Discharge Follow-up with Specified Provider: Dr. Ivory; 1 Week   As directed      To: Dr. Ivory    Follow Up: 1 Week    Follow Up Details: Decubitus ulcer         Referral to Wound Clinic   As directed         Follow-up Information     Johnathan Donald MD .    Specialty: Family Medicine  Why: Johnathan Donald MD (1 to 2 weeks posthospitalization follow-up)  Contact information:  [address] [address]  [phone]             [facility] .    Specialty: Wound Care  Contact information:  [facility] [id number] [phone]                            The ASCVD Risk score ([place] ELVIN Jr., et al., 2013) failed to calculate for the following reasons:    Cannot find a previous HDL lab    Cannot find a previous total cholesterol lab     Edwina Dodson MD  08/18/22  10:37 EDT    Please note that this discharge summary required more than 30 minutes to  complete.      Electronically signed by Jaycee Dodson MD at 08/18/22 1037       Discharge Order (From admission, onward)     Start     Ordered    08/18/22 1032  Discharge patient  Once        Expected Discharge Date: 08/18/22    [place] [facility] [place]    Physician of Record for Attribution - Please select from Treatment Team: JAYCEE DODSON [[id number]]    Review needed by CMO to determine Physician of Record: No       Question Answer Comment   Physician of Record for Attribution - Please select from Treatment Team JAYCEE DODSON    Review needed by CMO to determine Physician of Record No        08/18/22 1037

## 2022-08-18 NOTE — DISCHARGE PLACEMENT REQUEST
"Jesika Aquino (71 y.o. Female)             Date of Birth   1950    Social Security Number       Address   [address] [facility] [address]    Home Phone   [phone]    MRN   [id number]       Latter day   None    Marital Status                               Admission Date   22    Admission Type   Urgent    Admitting Provider   Nicholas Carrillo DO    Attending Provider   Edwina Dodson MD    Department, Room/Bed   [address], 3344/1S       Discharge Date       Discharge Disposition   Home-Health Care [facility]    Discharge Destination                               Attending Provider: Edwina Dodson MD    Allergies: No Known Allergies    Isolation: None   Infection: None   Code Status: No CPR   Advance Care Planning Activity    Ht: 162.6 cm (64.02\")   Wt: 61.1 kg (134 lb 12.8 oz)    Admission Cmt: None   Principal Problem: Infected decubitus ulcer [L89.90,L08.9]                 Active Insurance as of 2022     Primary Coverage     Payor Plan Insurance Group Employer/Plan Group    ANTHEM MEDICARE REPLACEMENT ANTHEM MEDICARE ADVANTAGE [id number]     Payor Plan Address Payor Plan Phone Number Payor Plan Fax Number Effective Dates    [address] [phone]  3/1/2022 - None Entered    [facility] [id number]       Subscriber Name Subscriber Birth Date Member ID       JESIKA AQUINO 1950 [id number]                 Emergency Contacts      (Rel.) Home Phone Work Phone Mobile Phone    JESIKA KOCH (Daughter) [phone] --        [address] [address]  Phone:  [phone]  Fax:  [phone] Date: Aug 18, 2022      Ambulatory Referral to Home Health     Patient:  Jesika Aquino MRN:  [id number]   [address]  [facility] [address] :  1950  SSN:    Phone: [phone] Sex:  F      INSURANCE PAYOR PLAN GROUP # SUBSCRIBER ID   Primary:    ANTHEM MEDICARE REPLACEMENT " [id number]      Referring Provider Information:  JAYCEE RODRÍGUEZ Phone: [phone] Fax: [phone]       Referral Information:   # Visits:  999 Referral Type: Home Health [42]   Urgency:  Routine Referral Reason: Specialty Services Required   Start Date: Aug 18, 2022 End Date:  To be determined by Insurer   Diagnosis: Pressure injury, stage 4, with infection (HCC) (L89.94,L08.9 [ICD-10-CM] 707.00,707.24 [ICD-9-CM])  Pressure injury, unstageable, with infection (HCC) (L89.95,L08.9 [ICD-10-CM] 707.00,707.25 [ICD-9-CM])      Refer to Dept:   Refer to Provider:   Refer to Provider Phone:   Refer to Facility:       Face to Face Visit Date: 8/18/2022  Follow-up provider for Plan of Care? I treated the patient in an acute care facility and will not continue treatment after discharge.  Follow-up provider: RANDY DONALD [[id number]]  Reason/Clinical Findings: Patient has CVA debilitated bedbound with large decubitus ulcer requiring wound care, she has a PEG tube.  Describe mobility limitations that make leaving home difficult: Patient is debilitated and bedbound needs company to leave the house  Nursing/Therapeutic Services Requested: Skilled Nursing  Nursing/Therapeutic Services Requested: Physical Therapy  Skilled nursing orders: Medication education  Skilled nursing orders: Ostomy instruction  Skilled nursing orders: Wound care dressing/changes  PT orders: Therapeutic exercise  PT orders: Home safety assessment  Frequency: 1 Week 1     This document serves as a request of services and does not constitute Insurance authorization or approval of services.  To determine eligibility, please contact the members Insurance carrier to verify and review coverage.     If you have medical questions regarding this request for services. Please contact [address] at [phone] during normal business hours.        Authorizing Provider:Jaycee Rodríguez MD  Authorizing Provider's NPI:  [id number]  Order Entered By: Edwina Dodson MD 8/18/2022 10:37 AM     Electronically signed by: Edwina Dodson MD 8/18/2022 10:37 AM            History & Physical      Nicholas Carrillo DO at 07/30/22 1230          H&P updated. The patient was examined and the following changes are noted:        Patient is a 71 y.o. female presented to [facility] who was brought to the ER on 7/22 for worsening posterior decubitus ulcer.  Please see the admitting history and physical for further details.  Patient has a history significant for vascular dementia, nonverbal and oropharyngeal dysphagia status post PEG.  She is bedbound at baseline.  MRI noted ulceration with edema without abscess and was negative for osteomyelitis.  General surgery was consulted and patient underwent debridement on 7/24 of a 28 x 10.2 cm area noting exposed bone.  She has an extensive wound requiring packing and skilled wound care.  Wound culture noted E. coli.  She had a urine culture positive for pansensitive E. coli and Klebsiella.  Infectious disease was consulted and initially placed her on empiric antibiotics that were eventually de-escalated to Rocephin.  On 7/29, ID was concerned about a new murmur noted, repeat echo ruled out vegetation and blood cultures have remained negative.  Flagyl was added on 7/30 after Bacteroides noted on anaerobic culture from 7/23.     Given patient's critical illness myopathy and complicated wound care, it was felt she required further nursing care and management.  In my opinion patient was an excellent candidate for LTAC given comorbidities and extensive wound however due to her insurance company opted against my medical recommendations despite not participating on her treatment team or ever evaluating the patient, LTAC was denied.  Her wounds were too advanced and complicated for skilled nursing and would be impossible for anyone at home to take care of her.  Since LTAC was denied by  insurance, swing bed was the only option to provide her with the most appropriate care and assistance as possible.  She was accepted as a swing bed patient for continued rehab and sacral decubitus wound care.    Electronically signed by Nicholas Carrillo DO at 22 1230   Source Note          Hospitalist History and Physical        Patient Identification  Name: Yecenia Botello  Age/Sex: 71 y.o. female  :  1950        MRN: [id number]  Visit Number: [id number]  Admit Date: 2022   PCP: Provider, No Known          Chief complaint worsening sacral decubitus ulcer    History of Present Illness:  Patient is a 71 y.o. female with history of stroke with significant residual deficits, now non-ambulatory and minimally verbal, who presents with complaints of worsening sacral decubitus ulcer per family. She was reportedly admitted to [facility] in 2022 for MARLON and failure to thrive, during which time she developed a sacral decubitus ulcer for which she was sent home with silvadene dressings and wound care instructions. Her family has been treating with healing honey. However, her wound has gotten worse with increasing exudative drainage that is foul smelling. Unfortunately no family was present during my exam of the patient and I tried multiple times to reach the patient's daughter by phone but no one answered.    Review of Systems  Review of Systems   Unable to perform ROS: Patient nonverbal       History  No past medical history on file.  No past surgical history on file.  No family history on file.   Unable to reach daughter by phone to discuss history     (Not in a hospital admission)    Allergies:  Patient has no known allergies. Unable to reach daughter by phone to discuss history    Objective     Vital Signs  Temp:  [97.7 °F (36.5 °C)-99.3 °F (37.4 °C)] 97.7 °F (36.5 °C)  Heart Rate:  [113] 113  Resp:  [16] 16  BP: (120-147)/(70-94) 128/78  Body mass index is 24.03 kg/m².    Physical  Exam:  Physical Exam  Constitutional:       Comments: Frail appearing 71 year old female, no acute distress, mildly lethargic but opens eyes to voice. Contracted.    HENT:      Head: Normocephalic and atraumatic.      Right Ear: External ear normal.      Left Ear: External ear normal.      Nose: Nose normal.      Mouth/Throat:      Mouth: Mucous membranes are dry.      Pharynx: Oropharynx is clear.   Eyes:      Extraocular Movements: Extraocular movements intact.      Conjunctiva/sclera: Conjunctivae normal.      Pupils: Pupils are equal, round, and reactive to light.   Cardiovascular:      Rate and Rhythm: Regular rhythm. Tachycardia present.      Pulses: Normal pulses.      Heart sounds: Murmur (grade III/VI systolic murmur, best heard right 2nd intercostal space) heard.   Pulmonary:      Effort: Pulmonary effort is normal.      Breath sounds: Normal breath sounds. No wheezing or rales.   Abdominal:      General: Abdomen is flat. Bowel sounds are normal. There is no distension.      Palpations: Abdomen is soft.   Musculoskeletal:      Cervical back: Normal range of motion and neck supple.      Comments: Significantly limited ROM due to contractures in both arms and left leg   Skin:     General: Skin is warm.      Capillary Refill: Capillary refill takes 2 to 3 seconds.      Comments: Stage I decubitus lesion right heel. Stage IV decubitus ulcer on sacrum, 5 x 3 cm in diameter, deep (appx 2 cm or more) with muscle visualized, possibly some bone. Foul smelling purulent drainage actively leaking out. Mild surrounding erythema of skin around ulcer.    Neurological:      Comments: Nonverbal during my exam. Contractures of both arms noted. Left leg also contracted. Right leg unbent but has passive range of motion on my exam.    Psychiatric:      Comments: Unable to assess due to nonverbal state           Results Review:       Lab Results:  Results from last 7 days   Lab Units 07/22/22  2352   WBC 10*3/mm3 26.95*    HEMOGLOBIN g/dL 10.7*   PLATELETS 10*3/mm3 541*     Results from last 7 days   Lab Units 07/22/22  2352   CRP mg/dL 18.07*     Results from last 7 days   Lab Units 07/22/22  2352   SODIUM mmol/L 128*   POTASSIUM mmol/L 5.4*   CHLORIDE mmol/L 95*   CO2 mmol/L 19.7*   BUN mg/dL 27*   CREATININE mg/dL 0.84   CALCIUM mg/dL 9.4   GLUCOSE mg/dL 257*         No results found for: HGBA1C  Results from last 7 days   Lab Units 07/22/22  2352   BILIRUBIN mg/dL 0.8   ALK PHOS U/L 155*   AST (SGOT) U/L 54*   ALT (SGPT) U/L 31                       I have reviewed the patient's laboratory results.    Imaging:  Imaging Results (Last 72 Hours)     Procedure Component Value Units Date/Time    CT Abdomen Pelvis Without Contrast [[id number]] Collected: 07/23/22 0300     Updated: 07/23/22 0302    Narrative:      CT Spine Lumbar WO, CT Abdomen Pelvis WO    INDICATION:    Worsening sacral decubitus ulcer.    TECHNIQUE:   CT of the lumbar spine and CT of the abdomen and pelvis without IV contrast. Coronal and sagittal reconstructions were obtained.  Radiation dose reduction techniques included automated exposure control or exposure modulation based on body size. Count of  known CT and cardiac nuc med studies performed in previous 12 months: 0.     COMPARISON:    None available.    FINDINGS:    LUMBAR SPINE:  No subluxation is seen in the lumbar spine on the sagittal images. There is mild lumbar levoscoliosis. There is no sacroiliac joint diastasis. There is no lumbar spine fracture. At the L5-S1 level, there is a mild disc bulge slightly eccentric to the  right, and there is bilateral facet arthropathy. No significant central canal or foraminal stenosis is seen. At the L4-5 level, there is a broad-based disc osteophyte complex slightly eccentric to the left. There is bilateral facet arthropathy with  moderate left and mild-to-moderate right foraminal stenosis. There is moderate central stenosis. At the L3-4 level, there is a broad-based  posterior disc bulge with facet arthropathy. There is mild narrowing of both foramina without central stenosis. At  the L2-3 level, there is a mild disc bulge with facet arthropathy. There is mild narrowing of the foramina with no central stenosis. At L1-2, there is a mild disc bulge with facet arthropathy. No central canal or foraminal stenosis. Paraspinal soft  tissues are normal.    ABDOMEN AND PELVIS:  There is streak artifact from the patient's arms, and there is motion degradation. There is some scarring/atelectasis in the lower lobes, and there are scattered granulomatous calcifications in both lung bases. There is atherosclerotic disease with no  aortic aneurysm. Gallbladder is surgically absent. No biliary obstruction. The unenhanced solid abdominal organs are grossly normal. No renal or ureteral stones are identified. There is no hydronephrosis. Tiny dependent stones are suspected in the  urinary bladder. The bladder is otherwise normal. Uterus is surgically absent.    A large amount of stool is noted in the rectal vault. Correlate clinically to exclude impaction. Moderate to large stool burden noted in the remainder of the colon. No evidence of acute colitis. The appendix is normal. There is no small bowel  obstruction. Gastrostomy tube is present in the body of the stomach. The stomach is otherwise normal. No definite adenopathy is identified. Anasarca is present.    There is a large ulcer overlying the sacrum with associated soft tissue gas and fat stranding. No definite erosive changes are seen in the sacrum. However, the ulcer likely extends up to the tip of the coccyx, and as such, osteomyelitis is not excluded.  There appears to be a soft tissue tract to the left of midline overlying the sacrum. Additionally, to the right of midline, there is a fluid and gas collection below and lateral to the tip of the coccyx. This is difficult to measure accurately due to its  configuration relative to the scan  plane. However, this measures at least 3.0 cm in depth and 5.9 cm in height by at least 5.5 cm in width.      Impression:        1. Sacral decubitus ulcer with fluid and gas collection noted in the right lower buttock and soft tissue gas and soft tissue changes noted to the left of midline overlying the sacrum as well. These abnormalities are in contact with the distal coccyx, and  as such, osteomyelitis is not excluded. No definite erosive changes are seen in the bone.  2. Large colonic stool burden, particularly in the rectal vault. Fecal impaction not excluded.  3. No renal or ureteral stones. No hydronephrosis.  4. Grossly normal small bowel and appendix. Well-positioned gastrostomy tube.  5. Cholecystectomy and hysterectomy.  6. No renal or ureteral stones. No hydronephrosis. However, multiple tiny bladder stones are noted.  7. Degenerative disease in the lumbar spine as described with no fracture or subluxation.    Signer Name: Johnathan Leon MD   Signed: 7/23/2022 3:00 AM   Workstation Name: [email or web address]    [facility]    CT Lumbar Spine Without Contrast [[id number]] Collected: 07/23/22 0300     Updated: 07/23/22 0302    Narrative:      CT Spine Lumbar WO, CT Abdomen Pelvis WO    INDICATION:    Worsening sacral decubitus ulcer.    TECHNIQUE:   CT of the lumbar spine and CT of the abdomen and pelvis without IV contrast. Coronal and sagittal reconstructions were obtained.  Radiation dose reduction techniques included automated exposure control or exposure modulation based on body size. Count of  known CT and cardiac nuc med studies performed in previous 12 months: 0.     COMPARISON:    None available.    FINDINGS:    LUMBAR SPINE:  No subluxation is seen in the lumbar spine on the sagittal images. There is mild lumbar levoscoliosis. There is no sacroiliac joint diastasis. There is no lumbar spine fracture. At the L5-S1 level, there is a mild disc bulge slightly eccentric to the  right, and  there is bilateral facet arthropathy. No significant central canal or foraminal stenosis is seen. At the L4-5 level, there is a broad-based disc osteophyte complex slightly eccentric to the left. There is bilateral facet arthropathy with  moderate left and mild-to-moderate right foraminal stenosis. There is moderate central stenosis. At the L3-4 level, there is a broad-based posterior disc bulge with facet arthropathy. There is mild narrowing of both foramina without central stenosis. At  the L2-3 level, there is a mild disc bulge with facet arthropathy. There is mild narrowing of the foramina with no central stenosis. At L1-2, there is a mild disc bulge with facet arthropathy. No central canal or foraminal stenosis. Paraspinal soft  tissues are normal.    ABDOMEN AND PELVIS:  There is streak artifact from the patient's arms, and there is motion degradation. There is some scarring/atelectasis in the lower lobes, and there are scattered granulomatous calcifications in both lung bases. There is atherosclerotic disease with no  aortic aneurysm. Gallbladder is surgically absent. No biliary obstruction. The unenhanced solid abdominal organs are grossly normal. No renal or ureteral stones are identified. There is no hydronephrosis. Tiny dependent stones are suspected in the  urinary bladder. The bladder is otherwise normal. Uterus is surgically absent.    A large amount of stool is noted in the rectal vault. Correlate clinically to exclude impaction. Moderate to large stool burden noted in the remainder of the colon. No evidence of acute colitis. The appendix is normal. There is no small bowel  obstruction. Gastrostomy tube is present in the body of the stomach. The stomach is otherwise normal. No definite adenopathy is identified. Anasarca is present.    There is a large ulcer overlying the sacrum with associated soft tissue gas and fat stranding. No definite erosive changes are seen in the sacrum. However, the ulcer  likely extends up to the tip of the coccyx, and as such, osteomyelitis is not excluded.  There appears to be a soft tissue tract to the left of midline overlying the sacrum. Additionally, to the right of midline, there is a fluid and gas collection below and lateral to the tip of the coccyx. This is difficult to measure accurately due to its  configuration relative to the scan plane. However, this measures at least 3.0 cm in depth and 5.9 cm in height by at least 5.5 cm in width.      Impression:        1. Sacral decubitus ulcer with fluid and gas collection noted in the right lower buttock and soft tissue gas and soft tissue changes noted to the left of midline overlying the sacrum as well. These abnormalities are in contact with the distal coccyx, and  as such, osteomyelitis is not excluded. No definite erosive changes are seen in the bone.  2. Large colonic stool burden, particularly in the rectal vault. Fecal impaction not excluded.  3. No renal or ureteral stones. No hydronephrosis.  4. Grossly normal small bowel and appendix. Well-positioned gastrostomy tube.  5. Cholecystectomy and hysterectomy.  6. No renal or ureteral stones. No hydronephrosis. However, multiple tiny bladder stones are noted.  7. Degenerative disease in the lumbar spine as described with no fracture or subluxation.    Signer Name: Johnathan Leon MD   Signed: 7/23/2022 3:00 AM   Workstation Name: [facility]    [facility]          I have personally reviewed the patient's radiologic imaging.        EKG: ordered, results pending        Assessment & Plan     - Severe sepsis, present on admission with tachycardia, leukocytosis, CRP elevation and lactic acidosis, secondary to infected sacral decubitus ulcer with potential for osteomyelitis, along with UTI: admit to hospitalist service. Treat with IV vancomycin and zosyn. Continue to trend WBC, CRP, lactate. Follow up on urine and blood cultures collected in ED. Consult ID  for antibiotic recommendations and general surgery for consideration of wound debridement.    - Type II Diabetes: based on hyperglycemia and A1c 7.2 (no history available at this time). Monitor accuchecks, cover with SSI. Review home regimen once reconciled by pharmacy.  - Hyponatremia, hyperkalemia: suspect secondary to dehydration. Repeat labs this morning to monitor response to IV fluid hydration. Check AM cortisol level.   - History of stroke with residual deficits, contractures, non-ambulatory, minimally verbal: supportive care. Review home meds once reconciled by pharmacy.   - Constipation: do not feel comfortable giving laxatives/stool softeners orally at this time. Monitor output closely, if no bowel movement moving forward, may need suppository or enema.    DVT Prophylaxis: SQ heparin    Estimated Length of Stay >2 midnights    I discussed the patient's findings, assessment and plan with nursing staff in the ED.     * patient is high risk due to severe sepsis, infected sacral decubitus ulcer, UTI, electrolyte disturbances, history of stroke with significant residual deficits, advanced age    Jose D Lassiter MD  22  04:39 EDT      Electronically signed by Jose D Lassiter MD at 22 0625             Jose D Lassiter MD at 22 0439          Hospitalist History and Physical        Patient Identification  Name: Yecenia Botello  Age/Sex: 71 y.o. female  :  1950        MRN: [id number]  Visit Number: [id number]  Admit Date: 2022   PCP: Provider, No Known          Chief complaint worsening sacral decubitus ulcer    History of Present Illness:  Patient is a 71 y.o. female with history of stroke with significant residual deficits, now non-ambulatory and minimally verbal, who presents with complaints of worsening sacral decubitus ulcer per family. She was reportedly admitted to [facility] in 2022 for MARLON and failure to thrive, during which time she developed a  sacral decubitus ulcer for which she was sent home with silvadene dressings and wound care instructions. Her family has been treating with healing honey. However, her wound has gotten worse with increasing exudative drainage that is foul smelling. Unfortunately no family was present during my exam of the patient and I tried multiple times to reach the patient's daughter by phone but no one answered.    Review of Systems  Review of Systems   Unable to perform ROS: Patient nonverbal       History  No past medical history on file.  No past surgical history on file.  No family history on file.   Unable to reach daughter by phone to discuss history     (Not in a hospital admission)    Allergies:  Patient has no known allergies. Unable to reach daughter by phone to discuss history    Objective     Vital Signs  Temp:  [97.7 °F (36.5 °C)-99.3 °F (37.4 °C)] 97.7 °F (36.5 °C)  Heart Rate:  [113] 113  Resp:  [16] 16  BP: (120-147)/(70-94) 128/78  Body mass index is 24.03 kg/m².    Physical Exam:  Physical Exam  Constitutional:       Comments: Frail appearing 71 year old female, no acute distress, mildly lethargic but opens eyes to voice. Contracted.    HENT:      Head: Normocephalic and atraumatic.      Right Ear: External ear normal.      Left Ear: External ear normal.      Nose: Nose normal.      Mouth/Throat:      Mouth: Mucous membranes are dry.      Pharynx: Oropharynx is clear.   Eyes:      Extraocular Movements: Extraocular movements intact.      Conjunctiva/sclera: Conjunctivae normal.      Pupils: Pupils are equal, round, and reactive to light.   Cardiovascular:      Rate and Rhythm: Regular rhythm. Tachycardia present.      Pulses: Normal pulses.      Heart sounds: Murmur (grade III/VI systolic murmur, best heard right 2nd intercostal space) heard.   Pulmonary:      Effort: Pulmonary effort is normal.      Breath sounds: Normal breath sounds. No wheezing or rales.   Abdominal:      General: Abdomen is flat. Bowel  sounds are normal. There is no distension.      Palpations: Abdomen is soft.   Musculoskeletal:      Cervical back: Normal range of motion and neck supple.      Comments: Significantly limited ROM due to contractures in both arms and left leg   Skin:     General: Skin is warm.      Capillary Refill: Capillary refill takes 2 to 3 seconds.      Comments: Stage I decubitus lesion right heel. Stage IV decubitus ulcer on sacrum, 5 x 3 cm in diameter, deep (appx 2 cm or more) with muscle visualized, possibly some bone. Foul smelling purulent drainage actively leaking out. Mild surrounding erythema of skin around ulcer.    Neurological:      Comments: Nonverbal during my exam. Contractures of both arms noted. Left leg also contracted. Right leg unbent but has passive range of motion on my exam.    Psychiatric:      Comments: Unable to assess due to nonverbal state           Results Review:       Lab Results:  Results from last 7 days   Lab Units 07/22/22  2352   WBC 10*3/mm3 26.95*   HEMOGLOBIN g/dL 10.7*   PLATELETS 10*3/mm3 541*     Results from last 7 days   Lab Units 07/22/22  2352   CRP mg/dL 18.07*     Results from last 7 days   Lab Units 07/22/22  2352   SODIUM mmol/L 128*   POTASSIUM mmol/L 5.4*   CHLORIDE mmol/L 95*   CO2 mmol/L 19.7*   BUN mg/dL 27*   CREATININE mg/dL 0.84   CALCIUM mg/dL 9.4   GLUCOSE mg/dL 257*         No results found for: HGBA1C  Results from last 7 days   Lab Units 07/22/22  2352   BILIRUBIN mg/dL 0.8   ALK PHOS U/L 155*   AST (SGOT) U/L 54*   ALT (SGPT) U/L 31                       I have reviewed the patient's laboratory results.    Imaging:  Imaging Results (Last 72 Hours)     Procedure Component Value Units Date/Time    CT Abdomen Pelvis Without Contrast [[id number]] Collected: 07/23/22 0300     Updated: 07/23/22 0302    Narrative:      CT Spine Lumbar WO, CT Abdomen Pelvis WO    INDICATION:    Worsening sacral decubitus ulcer.    TECHNIQUE:   CT of the lumbar spine and CT of the abdomen  and pelvis without IV contrast. Coronal and sagittal reconstructions were obtained.  Radiation dose reduction techniques included automated exposure control or exposure modulation based on body size. Count of  known CT and cardiac nuc med studies performed in previous 12 months: 0.     COMPARISON:    None available.    FINDINGS:    LUMBAR SPINE:  No subluxation is seen in the lumbar spine on the sagittal images. There is mild lumbar levoscoliosis. There is no sacroiliac joint diastasis. There is no lumbar spine fracture. At the L5-S1 level, there is a mild disc bulge slightly eccentric to the  right, and there is bilateral facet arthropathy. No significant central canal or foraminal stenosis is seen. At the L4-5 level, there is a broad-based disc osteophyte complex slightly eccentric to the left. There is bilateral facet arthropathy with  moderate left and mild-to-moderate right foraminal stenosis. There is moderate central stenosis. At the L3-4 level, there is a broad-based posterior disc bulge with facet arthropathy. There is mild narrowing of both foramina without central stenosis. At  the L2-3 level, there is a mild disc bulge with facet arthropathy. There is mild narrowing of the foramina with no central stenosis. At L1-2, there is a mild disc bulge with facet arthropathy. No central canal or foraminal stenosis. Paraspinal soft  tissues are normal.    ABDOMEN AND PELVIS:  There is streak artifact from the patient's arms, and there is motion degradation. There is some scarring/atelectasis in the lower lobes, and there are scattered granulomatous calcifications in both lung bases. There is atherosclerotic disease with no  aortic aneurysm. Gallbladder is surgically absent. No biliary obstruction. The unenhanced solid abdominal organs are grossly normal. No renal or ureteral stones are identified. There is no hydronephrosis. Tiny dependent stones are suspected in the  urinary bladder. The bladder is otherwise  normal. Uterus is surgically absent.    A large amount of stool is noted in the rectal vault. Correlate clinically to exclude impaction. Moderate to large stool burden noted in the remainder of the colon. No evidence of acute colitis. The appendix is normal. There is no small bowel  obstruction. Gastrostomy tube is present in the body of the stomach. The stomach is otherwise normal. No definite adenopathy is identified. Anasarca is present.    There is a large ulcer overlying the sacrum with associated soft tissue gas and fat stranding. No definite erosive changes are seen in the sacrum. However, the ulcer likely extends up to the tip of the coccyx, and as such, osteomyelitis is not excluded.  There appears to be a soft tissue tract to the left of midline overlying the sacrum. Additionally, to the right of midline, there is a fluid and gas collection below and lateral to the tip of the coccyx. This is difficult to measure accurately due to its  configuration relative to the scan plane. However, this measures at least 3.0 cm in depth and 5.9 cm in height by at least 5.5 cm in width.      Impression:        1. Sacral decubitus ulcer with fluid and gas collection noted in the right lower buttock and soft tissue gas and soft tissue changes noted to the left of midline overlying the sacrum as well. These abnormalities are in contact with the distal coccyx, and  as such, osteomyelitis is not excluded. No definite erosive changes are seen in the bone.  2. Large colonic stool burden, particularly in the rectal vault. Fecal impaction not excluded.  3. No renal or ureteral stones. No hydronephrosis.  4. Grossly normal small bowel and appendix. Well-positioned gastrostomy tube.  5. Cholecystectomy and hysterectomy.  6. No renal or ureteral stones. No hydronephrosis. However, multiple tiny bladder stones are noted.  7. Degenerative disease in the lumbar spine as described with no fracture or subluxation.    Signer Name: Johnathan  MD Awilda   Signed: 7/23/2022 3:00 AM   Workstation Name: [id number]    [facility]    CT Lumbar Spine Without Contrast [[id number]] Collected: 07/23/22 0300     Updated: 07/23/22 0302    Narrative:      CT Spine Lumbar WO, CT Abdomen Pelvis WO    INDICATION:    Worsening sacral decubitus ulcer.    TECHNIQUE:   CT of the lumbar spine and CT of the abdomen and pelvis without IV contrast. Coronal and sagittal reconstructions were obtained.  Radiation dose reduction techniques included automated exposure control or exposure modulation based on body size. Count of  known CT and cardiac nuc med studies performed in previous 12 months: 0.     COMPARISON:    None available.    FINDINGS:    LUMBAR SPINE:  No subluxation is seen in the lumbar spine on the sagittal images. There is mild lumbar levoscoliosis. There is no sacroiliac joint diastasis. There is no lumbar spine fracture. At the L5-S1 level, there is a mild disc bulge slightly eccentric to the  right, and there is bilateral facet arthropathy. No significant central canal or foraminal stenosis is seen. At the L4-5 level, there is a broad-based disc osteophyte complex slightly eccentric to the left. There is bilateral facet arthropathy with  moderate left and mild-to-moderate right foraminal stenosis. There is moderate central stenosis. At the L3-4 level, there is a broad-based posterior disc bulge with facet arthropathy. There is mild narrowing of both foramina without central stenosis. At  the L2-3 level, there is a mild disc bulge with facet arthropathy. There is mild narrowing of the foramina with no central stenosis. At L1-2, there is a mild disc bulge with facet arthropathy. No central canal or foraminal stenosis. Paraspinal soft  tissues are normal.    ABDOMEN AND PELVIS:  There is streak artifact from the patient's arms, and there is motion degradation. There is some scarring/atelectasis in the lower lobes, and there are scattered  granulomatous calcifications in both lung bases. There is atherosclerotic disease with no  aortic aneurysm. Gallbladder is surgically absent. No biliary obstruction. The unenhanced solid abdominal organs are grossly normal. No renal or ureteral stones are identified. There is no hydronephrosis. Tiny dependent stones are suspected in the  urinary bladder. The bladder is otherwise normal. Uterus is surgically absent.    A large amount of stool is noted in the rectal vault. Correlate clinically to exclude impaction. Moderate to large stool burden noted in the remainder of the colon. No evidence of acute colitis. The appendix is normal. There is no small bowel  obstruction. Gastrostomy tube is present in the body of the stomach. The stomach is otherwise normal. No definite adenopathy is identified. Anasarca is present.    There is a large ulcer overlying the sacrum with associated soft tissue gas and fat stranding. No definite erosive changes are seen in the sacrum. However, the ulcer likely extends up to the tip of the coccyx, and as such, osteomyelitis is not excluded.  There appears to be a soft tissue tract to the left of midline overlying the sacrum. Additionally, to the right of midline, there is a fluid and gas collection below and lateral to the tip of the coccyx. This is difficult to measure accurately due to its  configuration relative to the scan plane. However, this measures at least 3.0 cm in depth and 5.9 cm in height by at least 5.5 cm in width.      Impression:        1. Sacral decubitus ulcer with fluid and gas collection noted in the right lower buttock and soft tissue gas and soft tissue changes noted to the left of midline overlying the sacrum as well. These abnormalities are in contact with the distal coccyx, and  as such, osteomyelitis is not excluded. No definite erosive changes are seen in the bone.  2. Large colonic stool burden, particularly in the rectal vault. Fecal impaction not  excluded.  3. No renal or ureteral stones. No hydronephrosis.  4. Grossly normal small bowel and appendix. Well-positioned gastrostomy tube.  5. Cholecystectomy and hysterectomy.  6. No renal or ureteral stones. No hydronephrosis. However, multiple tiny bladder stones are noted.  7. Degenerative disease in the lumbar spine as described with no fracture or subluxation.    Signer Name: Johnathan Leon MD   Signed: 7/23/2022 3:00 AM   Workstation Name: [id number]    [facility]          I have personally reviewed the patient's radiologic imaging.        EKG: ordered, results pending        Assessment & Plan     - Severe sepsis, present on admission with tachycardia, leukocytosis, CRP elevation and lactic acidosis, secondary to infected sacral decubitus ulcer with potential for osteomyelitis, along with UTI: admit to hospitalist service. Treat with IV vancomycin and zosyn. Continue to trend WBC, CRP, lactate. Follow up on urine and blood cultures collected in ED. Consult ID for antibiotic recommendations and general surgery for consideration of wound debridement.    - Type II Diabetes: based on hyperglycemia and A1c 7.2 (no history available at this time). Monitor accuchecks, cover with SSI. Review home regimen once reconciled by pharmacy.  - Hyponatremia, hyperkalemia: suspect secondary to dehydration. Repeat labs this morning to monitor response to IV fluid hydration. Check AM cortisol level.   - History of stroke with residual deficits, contractures, non-ambulatory, minimally verbal: supportive care. Review home meds once reconciled by pharmacy.   - Constipation: do not feel comfortable giving laxatives/stool softeners orally at this time. Monitor output closely, if no bowel movement moving forward, may need suppository or enema.    DVT Prophylaxis: SQ heparin    Estimated Length of Stay >2 midnights    I discussed the patient's findings, assessment and plan with nursing staff in the ED.     *  patient is high risk due to severe sepsis, infected sacral decubitus ulcer, UTI, electrolyte disturbances, history of stroke with significant residual deficits, advanced age    Jose D Lassiter MD  07/23/22  04:39 EDT      Electronically signed by Jose D Lassiter MD at 07/23/22 0625       Vital Signs (last day)     Date/Time Temp Temp src Pulse Resp BP Patient Position SpO2    08/18/22 0738 -- -- -- -- -- -- 96    08/18/22 0600 98.9 (37.2) Oral 83 16 145/73 Lying 95    08/17/22 1908 -- -- -- -- -- -- 96    08/17/22 1900 98.7 (37.1) Oral 90 16 140/80 Lying 95    08/17/22 0734 -- -- 74 -- -- -- 97    08/17/22 0619 97.9 (36.6) Axillary 89 16 154/55 Lying 95    08/17/22 0300 98.6 (37) Axillary 74 16 116/62 Lying 94          Intake & Output (last day)       08/17 0701  08/18 0700 08/18 0701 08/19 0700    P.O. 0     I.V. (mL/kg)      Other      NG/     Total Intake(mL/kg) 740 (12.1)     Urine (mL/kg/hr) 1200 (0.8)     Stool 0     Total Output 1200     Net -460           Urine Unmeasured Occurrence 3 x     Stool Unmeasured Occurrence 1 x         Lines, Drains & Airways     Active LDAs     Name Placement date Placement time Site Days    Midline Catheter - Single Lumen 07/25/22 Right Basilic 07/25/22  1137  -- 24    Gastrostomy/Enterostomy PEG-jejunostomy LUQ --  --  LUQ  --    Urethral Catheter 16 Fr. 08/17/22  1650  -- less than 1                  Current Facility-Administered Medications   Medication Dose Route Frequency Provider Last Rate Last Admin   • aspirin chewable tablet 81 mg  81 mg Per PEG Tube Daily Daja Ivory MD   81 mg at 08/18/22 0945   • atorvastatin (LIPITOR) tablet 40 mg  40 mg Per PEG Tube Nightly Daja Ivory MD   40 mg at 08/17/22 2023   • Dakins (full strength) (Dakins full strength) external solution 20 mL  20 mL Topical Q12H Daja Ivory MD   20 mL at 08/17/22 2025   • Dakins (full strength) (Dakins full strength) external solution 20 mL  20 mL Topical BID PRN  Daja Ivory MD   20 mL at 08/18/22 0945   • dextrose (D50W) (25 g/50 mL) IV injection 25 g  25 g Intravenous Q15 Min PRN Daja Ivory MD       • dextrose (GLUTOSE) oral gel 15 g  15 g Oral Q15 Min PRN Daja Ivory MD       • donepezil (ARICEPT) tablet 10 mg  10 mg Per PEG Tube Nightly Daja Ivory MD   10 mg at 08/17/22 2023   • glucagon (human recombinant) (GLUCAGEN DIAGNOSTIC) injection 1 mg  1 mg Intramuscular Q15 Min PRN Daja Ivory MD       • heparin (porcine) 5000 UNIT/ML injection 5,000 Units  5,000 Units Subcutaneous Q12H Daja Ivory MD   5,000 Units at 08/18/22 0946   • Insulin Aspart (novoLOG) injection 0-9 Units  0-9 Units Subcutaneous TID AC Daja Ivory MD   6 Units at 08/18/22 0834   • insulin detemir (LEVEMIR) injection 25 Units  25 Units Subcutaneous Nightly Edwina Dodson MD   25 Units at 08/16/22 2027   • ipratropium (ATROVENT) nebulizer solution 0.5 mg  0.5 mg Nebulization Q6H PRN Johana Wilson PA       • isosorbide mononitrate (IMDUR) 24 hr tablet 30 mg  30 mg Oral Q24H Daja Ivory MD   30 mg at 08/18/22 0947   • levothyroxine (SYNTHROID, LEVOTHROID) tablet 25 mcg  25 mcg Per PEG Tube QAM Daja Ivory MD   25 mcg at 08/18/22 0601   • losartan (COZAAR) tablet 50 mg  50 mg Per PEG Tube Q24H Daja Ivory MD   50 mg at 08/18/22 0947   • magic barrier cream 1 application  1 application Topical PRN Daja Ivory MD       • rOPINIRole (REQUIP) tablet 0.5 mg  0.5 mg Per PEG Tube Nightly Daja Ivory MD   0.5 mg at 08/17/22 2023   • sodium chloride 0.9 % flush 10 mL  10 mL Intravenous Q12H Daja Ivory MD   10 mL at 08/18/22 0948   • sodium chloride 0.9 % flush 10 mL  10 mL Intravenous PRN Daja Ivory MD       • vitamin D3 capsule 5,000 Units  5,000 Units Per PEG Tube Daily Daja Ivory MD   5,000 Units at 08/18/22 0947        Operative/Procedure Notes (most recent note)      Daja Ivory MD at  08/04/22 1427        Debridement sacral wound with partial secondary closure    Pre-op: Sacral wound    Post-op:  Same    Surgeon:  Daja Ivory M.D., F.A.C.S.    Assistant:  None    Anesthesia:  general      Indications: Large sacral wound which tunneled from the sacrum towards the greater trochanter.  Patient has been receiving wound care and the wound is granulating well.       Procedure Details   After obtaining informed consent and receiving preoperative antibiotics and with venous compression boots in place, the patient was taken to the operating room and placed under anesthesia.  There was a large opening at the sacrum with a smaller opening made gluteal with a V shaped incision.  From the V-shaped incision to the most lateral extension there was a Penrose drain.  The Penrose drain was removed.  All tissue was inspected and was granulating well.  There was a 4 x 2 cm necrotic area under the sacral wound which was excisionally debrided.  In the central open area #1 Vicryl sutures were used to close the deep dead space and the skin was then closed with interrupted 2-0 nylon sutures.  This was over an 8 cm area.  The most lateral wound opening which was granulating well was packed with 2 inch Nain.  The sacral wound was then secondarily closed along the medial aspect.  4-1/2 cm along the right lateral aspect were closed with deep sutures of #1 Vicryl from Kwabena's fascia to the muscle.  Over this 4-1/2 cm area the skin was closed with 2-0 nylon sutures.  The remainder of the open wound was packed with wet-to-dry Nain and Kerlix dressings were placed.  Patient tolerated the procedure well was taken to the recovery room in stable condition    Findings:      Estimated Blood Loss:  Minimal    Blood Administered: none           Drains: none           Specimens: None     Grafts and Implants: No       Complications:  none           Disposition: PACU - hemodynamically stable.           Condition: stable         Electronically signed by Daja Ivory MD at 22 1432          Physician Progress Notes (most recent note)      Edwina Dodson MD at 22 1120              [facility]     Patient Identification:  Name:  Yecenia Botello  Age:  71 y.o.  Sex:  female  :  1950  MRN:  [id number]  Visit Number:  [id number]  Primary Care Provider:  Johnathan Ashraf MD    Length of stay:  17    Subjective: Patient seen and examined, she is awake, she is nonverbal, she has visual tracking.  Noted nocturnal desaturation, also reported crackles on her chest on exam.  When I examined the patient her nasal cannula is not on her nostril.  X-ray no obvious pneumonia no infiltrate.  Does not appear to be volume overloaded.  She does appear slightly edematous.    Chief Complaint: Nocturnal desaturate, large sacral ulcer  ----------------------------------------------------------------------------------------------------------------------  Current [facility] Meds:  aspirin, 81 mg, Per PEG Tube, Daily  atorvastatin, 40 mg, Per PEG Tube, Nightly  Dakins (full strength), 20 mL, Topical, Q12H  donepezil, 10 mg, Per PEG Tube, Nightly  heparin (porcine), 5,000 Units, Subcutaneous, Q12H  Insulin Aspart, 0-9 Units, Subcutaneous, TID AC  insulin detemir, 25 Units, Subcutaneous, Nightly  isosorbide mononitrate, 30 mg, Oral, Q24H  levothyroxine, 25 mcg, Per PEG Tube, QAM  losartan, 50 mg, Per PEG Tube, Q24H  rOPINIRole, 0.5 mg, Per PEG Tube, Nightly  sodium chloride, 10 mL, Intravenous, Q12H  vitamin D3, 5,000 Units, Per PEG Tube, Daily         ----------------------------------------------------------------------------------------------------------------------  Vital Signs:  Temp:  [98.3 °F (36.8 °C)-98.7 °F (37.1 °C)] 98.3 °F (36.8 °C)  Heart Rate:  [75-79] 75  Resp:  [18] 18  BP: (130-148)/(72-73) 148/73       Tele:       22  0500 08/15/22  0500 22  0500   Weight: 61.8 kg (136 lb  3.2 oz) 62.6 kg (137 lb 14.4 oz) 62.6 kg (137 lb 14.4 oz)     Body mass index is 23.66 kg/m².    Intake/Output Summary (Last 24 hours) at 8/16/2022 1120  Last data filed at 8/16/2022 0543  Gross per 24 hour   Intake 2560 ml   Output --   Net 2560 ml     NPO Diet NPO Type: Strict NPO  ----------------------------------------------------------------------------------------------------------------------  Physical exam:  General: awake, alert, does not communicate, she has visual tracking, appears comfortable.    No respiratory distress.    Skin:  Skin is warm and dry. No rash noted. No pallor.    HENT:  Head:  Normocephalic and atraumatic.  Mouth:  Moist mucous membranes.    Eyes:  Conjunctivae and EOM are normal.  Pupils are equal, round, and reactive to light.  No scleral icterus.    Neck:  Neck supple.  No JVD present.    She has hepatojugular reflux, no bruit  Pulmonary/Chest:  No respiratory distress, no wheezes, no crackles, with normal breath sounds and good air movement.  Cardiovascular: Faint murmur of mitral area, no gallop no rub regular rhythm.  Abdominal:  Soft.  Bowel sounds are normal.  No distension and no tenderness.   Extremities:  No edema, no tenderness, and no deformity.  No red or swollen joints anywhere.  Strong pulses in all 4 extremities with no clubbing, no cyanosis, no edema.  Neurological:  Motor strength equal no obvious deficit, sensory grossly intact.   No cranial nerve deficit.  No tongue deviation.  No facial droop.  No slurred speech.    Genitourinary: No Raines catheter  Back: Right paramedial ulcer with dressing  ----------------------------------------------------------------------------------------------------------------------  ----------------------------------------------------------------------------------------------------------------------      Results from last 7 days   Lab Units 08/13/22  0533 08/12/22  0638 08/11/22  0740   WBC 10*3/mm3 7.65 6.90 6.65   HEMOGLOBIN g/dL 8.7*  8.1* 8.0*   HEMATOCRIT % 27.8* 25.5* 26.2*   MCV fL 97.5* 97.3* 99.2*   MCHC g/dL 31.3* 31.8 30.5*   PLATELETS 10*3/mm3 280 313 290     Results from last 7 days   Lab Units 08/16/22  0220   PH, ARTERIAL pH units 7.496*   PO2 ART mm Hg 67.0*   PCO2, ARTERIAL mm Hg 36.0   HCO3 ART mmol/L 27.8*     Results from last 7 days   Lab Units 08/13/22  0533 08/12/22  0638 08/11/22  0740   SODIUM mmol/L 135* 135* 134*   POTASSIUM mmol/L 4.4 4.3 4.4   CHLORIDE mmol/L 102 102 104   CO2 mmol/L 22.9 23.8 23.0   BUN mg/dL 14 16 14   CREATININE mg/dL 0.43* 0.43* 0.37*   CALCIUM mg/dL 8.4* 8.2* 8.2*   GLUCOSE mg/dL 164* 238* 98   Estimated Creatinine Clearance: 120.3 mL/min (A) (by C-G formula based on SCr of 0.43 mg/dL (L)).    No results found for: AMMONIA      No results found for: BLOODCX  No results found for: URINECX  No results found for: WOUNDCX  No results found for: STOOLCX    I have personally looked at the labs and they are summarized above.  ----------------------------------------------------------------------------------------------------------------------  Imaging Results (Last 24 Hours)     Procedure Component Value Units Date/Time    XR Chest 1 View [[id number]] Collected: 08/16/22 0242     Updated: 08/16/22 0244    Narrative:      CR Chest 1 Vw    INDICATION:   Hypoxia with labored breathing     COMPARISON:    8/13/2022    FINDINGS:  Portable AP view(s) of the chest.        Since the previous examination the heart, lungs and mediastinum are unchanged. The aorta is tortuous. No new infiltrates are seen. Vascular markings are normal. No effusions are seen.       Impression:      No new infiltrates are noted since the previous examination.    Signer Name: Johnathan Herbert MD   Signed: 8/16/2022 2:42 AM   Workstation Name: [id number]    [facility]        ----------------------------------------------------------------------------------------------------------------------  Assessment and  Plan:  -Large sacral decubiti present on admission status postdebridement, status post partial suture  -History of CVA nonverbal, status post PEG tube placement  -Diabetes type 2 with hyperglycemia  -Nocturnal hypoxia  -History of essential hypertension  -History of hypothyroid    Pulmonary toilet, oropharyngeal suctioning every shift, proper placement of oxygen supplementation, empiric dose of Lasix x1, aspiration precaution.  Continue Accu-Chek and sliding scale, wound care.      Disposition swing bed, family wants to bring her home once discharged.      Edwina Dodson MD  22  11:20 EDT    Electronically signed by Edwina Dodson MD at 22 1138          Consult Notes (most recent note)      Najma Mckeon APRN at 22 1720            Consult Note     Patient Identification:  Name:  Yecenia Botello  Age:  71 y.o.  Sex:  female  :  1950  MRN:  [id number]   Visit Number:  [id number]  Primary Care Physician:  Johnathan Ashraf MD     Subjective     Chief complaint:     Pressure injury     History of presenting illness:     Patient is a 71 y.o. female with history of stroke with significant residual deficits, now non-ambulatory and minimally verbal, who presents to ED on 2022 with complaints of worsening sacral decubitus ulcer per family. She was reportedly admitted to [facility] in 2022 for MARLON and failure to thrive, during which time she developed a sacral decubitus ulcer for which she was sent home with silvadene dressings and wound care instructions. Her family has been treating with  honey. However, her wound has gotten worse with increasing exudative drainage that is foul smelling. She had area debrided on 2022 by Dr. Ivory. Penrose drain placed to right gluteal. Please note history obtained from chart review and NATIVIDAD Hill as patient is unable to update HPI.    22  Resting in bed. Not communicative. NAD noted. Had recent extensive surgical  debridement in OR. Tolerating treatments.    08/16/2022: Patient seen resting in bed. Unable to update HPI. Jessica HENSON present during exam. Patient had right lateral aspect of wound closed by Dr. Ivory on 08/14/2022. Lateral aspect sutures intake without evidence of dehiscene, or cellulitis. Small area of induration noted around suture line. Vital signs stable, afebrile. No new issues or concerns reported. Vital signs stable, afebrile. Plans for patient to return home with family.     08/17/2022: Patient seen resting in bed. NATIVIDAD Newton present during exam. Wounds overall stable. Left lower leg swelling has decreased. Denies any new issues or concerns. Vital signs stable, afebrile.   ---------------------------------------------------------------------------------------------------------------------   Review of Systems:  Review of Systems   Unable to perform ROS: Patient nonverbal      ---------------------------------------------------------------------------------------------------------------------   Past Medical History:   Diagnosis Date   • Stroke (cerebrum) (HCC)      Past Surgical History:   Procedure Laterality Date   • DEBRIDEMENT OF ISCHIAL ULCER/BUTTOCKS WOUND N/A 7/23/2022    Procedure: DEBRIDEMENT OF ISCHIAL ULCER/BUTTOCKS WOUND;  Surgeon: Daja Ivory MD;  Location: [facility];  Service: General;  Laterality: N/A;   • DEBRIDEMENT OF ISCHIAL ULCER/BUTTOCKS WOUND N/A 8/4/2022    Procedure: DEBRIDEMENT OF ISCHIAL ULCER/BUTTOCKS WOUND;  Surgeon: Daja Ivory MD;  Location: [facility];  Service: General;  Laterality: N/A;     History reviewed. No pertinent family history.  Social History     Socioeconomic History   • Marital status:    Tobacco Use   • Smoking status: Never Smoker   Vaping Use   • Vaping Use: Unknown   Substance and Sexual Activity   • Alcohol use: Defer   • Drug use: Defer   • Sexual activity: Defer      ---------------------------------------------------------------------------------------------------------------------   Allergies:  Patient has no known allergies.  ---------------------------------------------------------------------------------------------------------------------   Medications below are reported home medications pulling from within the system; at this time, these medications have not been reconciled unless otherwise specified and are in the verification process for further verifcation as current home medications.    Prior to Admission Medications     Prescriptions Last Dose Informant Patient Reported? Taking?    amLODIPine (NORVASC) 10 MG tablet Unknown Child Yes No    Take 10 mg by mouth Daily.    aspirin 81 MG EC tablet Unknown Child Yes No    Take 81 mg by mouth Daily.    atorvastatin (LIPITOR) 40 MG tablet Unknown Child Yes No    Take 40 mg by mouth Every Night.    docusate sodium (COLACE) 100 MG capsule Unknown Child Yes No    Take 100 mg by mouth 2 (Two) Times a Day.    donepezil (ARICEPT) 10 MG tablet Unknown Child Yes No    Take 10 mg by mouth Every Night.    gabapentin (NEURONTIN) 300 MG capsule Unknown Child Yes No    Take 300 mg by mouth 2 (Two) Times a Day.    Insulin Glargine (BASAGLAR KWIKPEN) 100 UNIT/ML injection pen Unknown Child Yes No    Inject 30 Units under the skin into the appropriate area as directed Every Night.    Insulin Lispro (humaLOG) 100 UNIT/ML injection Unknown Child Yes No    Inject 6 Units under the skin into the appropriate area as directed 3 (Three) Times a Day Before Meals.    isosorbide mononitrate (IMDUR) 30 MG 24 hr tablet Unknown Child Yes No    Take 30 mg by mouth Daily.    levothyroxine (SYNTHROID, LEVOTHROID) 25 MCG tablet Unknown Child Yes No    Take 25 mcg by mouth Daily.    rOPINIRole (REQUIP) 0.5 MG tablet Unknown Child Yes No    Take 0.5 mg by mouth Every Night. Take 1 hour before bedtime.    vitamin D3 125 MCG (5000 UT) capsule capsule Unknown  Child Yes No    Take 5,000 Units by mouth Daily.        ---------------------------------------------------------------------------------------------------------------------    Objective     Hospital Scheduled Meds:  aspirin, 81 mg, Per PEG Tube, Daily  atorvastatin, 40 mg, Per PEG Tube, Nightly  Dakins (full strength), 20 mL, Topical, Q12H  donepezil, 10 mg, Per PEG Tube, Nightly  heparin (porcine), 5,000 Units, Subcutaneous, Q12H  Insulin Aspart, 0-9 Units, Subcutaneous, TID AC  insulin detemir, 25 Units, Subcutaneous, Nightly  isosorbide mononitrate, 30 mg, Oral, Q24H  levothyroxine, 25 mcg, Per PEG Tube, QAM  losartan, 50 mg, Per PEG Tube, Q24H  rOPINIRole, 0.5 mg, Per PEG Tube, Nightly  sodium chloride, 10 mL, Intravenous, Q12H  vitamin D3, 5,000 Units, Per PEG Tube, Daily           Current listed hospital scheduled medications may not yet reflect those currently placed in orders that are signed and held, awaiting patient's arrival to floor/unit.    ---------------------------------------------------------------------------------------------------------------------   Vital Signs:  Temp:  [97.9 °F (36.6 °C)-99 °F (37.2 °C)] 97.9 °F (36.6 °C)  Heart Rate:  [74-89] 74  Resp:  [16-18] 16  BP: (116-154)/(55-66) 154/55  Mean Arterial Pressure (Non-Invasive) for the past 24 hrs (Last 3 readings):   Noninvasive MAP (mmHg)   08/17/22 0619 125     SpO2 Percentage    08/17/22 0300 08/17/22 0619 08/17/22 0734   SpO2: 94% 95% 97%     SpO2:  [94 %-99 %] 97 %  on   ;   Device (Oxygen Therapy): room air    Body mass index is 23.42 kg/m².  Wt Readings from Last 3 Encounters:   08/17/22 61.9 kg (136 lb 8 oz)   07/30/22 61.5 kg (135 lb 8 oz)   02/26/15 95.7 kg (211 lb)       ---------------------------------------------------------------------------------------------------------------------   Physical Exam:  Physical Exam  Constitutional:       General: She is not in acute distress.     Appearance: She is not toxic-appearing.    HENT:      Head: Normocephalic.   Cardiovascular:      Rate and Rhythm: Normal rate and regular rhythm.   Pulmonary:      Effort: Pulmonary effort is normal. No respiratory distress.   Abdominal:      General: There is no distension.   Musculoskeletal:      Cervical back: Normal range of motion and neck supple.   Skin:     General: Skin is warm and dry.      Comments: Large pressure injury in the sacral area now extending to the buttocks post sharp debridement. Base appears essentially clean wit no purulence or malodor noted. Sutures noted to right buttock. Medial right buttock sutures with scattered areas that are dehisced. Lateral sutures intact, induration noted to suture line.   Neurological:      Mental Status: She is alert.       Assessment & Plan      Recommend routine turning and pressure re-distribution. Turn q 2 hours while in bed, every 15 minutes if in a chair. Float heels. Recommend adequate hydration, along with protein and vitamin intake. Open wounds can serve as a nidus for local and systemic infection. Needs monitoring. Recommend low airloss/alternating pressure support surface.    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 Recommend saline moistened  gauze BID.  Consider a wound vac to remain area.  Diabetes   -Recommend adequate glycemic control to help promote wound healing       Recommend follow-up in outpatient wound clinic once stable for discharge.    GOLDIE Wang  [facility]  08/17/2022  1720  Consults      Electronically signed by Najma Mckeon APRN at 08/17/22 1721          Nutrition Notes (most recent note)      Daisy Sears RD at 08/13/22 0800        Nutrition Services    Patient Name:  Yecenia Botello  YOB: 1950  MRN: [id number]  Admit Date:  7/30/2022    BMI: 23.37  Diet: Isosource 1.5 at 55 ml/hr continuous  Intake: meeting needs  Fluid: meeting  needs     Electronically signed by:  Daisy Sears RD  08/13/22 08:01 EDT     Electronically signed by Daisy Sears RD at 08/13/22 0803          Physical Therapy Notes (most recent note)      Jacquelin Pérez, PT at 07/30/22 1345  Version 1 of 1            07/30/22 1343   OTHER   Discipline physical therapist   Rehab Time/Intention   Session Not Performed unable to evaluate, medical status change  (Pt is not appropriate for skilled PT services due to inability to participate, bed bound at baseline requiring total care.  Per chart review family has equipment needed at home including hospital bed and lift device.  PT to sign off this date.)   Therapy Assessment/Plan (PT)   Criteria for Skilled Interventions Met (PT) no       Electronically signed by Jacquelin Pérez PT at 07/30/22 1345          Occupational Therapy Notes (most recent note)      Karen Darden, OT at 07/30/22 1141             07/30/22 1138   OTHER   Discipline occupational therapist  (Patient swing bed activity assessment--no activities at this time due to patient current med/cognitive status.  Recommend socialization with staff as appropriate, TV/music, family socialization.)   Rehab Time/Intention   Session Not Performed unable to evaluate, medical status change;other (see comments)  (Patient unable to actively participate in ot services.  Total assist with badl and fxl mobility.  No skilled OT services at this time.)       Electronically signed by Karen Darden OT at 07/30/22 1141       Speech Language Pathology Notes (most recent note)    No notes exist for this encounter.         Respiratory Therapy Notes (most recent note)    No notes exist for this encounter.         ADL Documentation (most recent)    Flowsheet Row Most Recent Value   Transferring 4 - completely dependent   Toileting 4 - completely dependent   Bathing 4 - completely dependent   Dressing 4 - completely dependent   Eating 4 - completely dependent   Communication 3 - unable to  understand or speak (not related to language barrier)   Swallowing 2 - difficulty swallowing liquids/foods   Equipment Currently Used at Home hospital bed, feeding device, lift device             Discharge Summary      Edwina Dodson MD at 22 1022              [facility] MEDICINE DISCHARGE SUMMARY    Patient Identification:  Name:  Yecenia Botello  Age:  71 y.o.  Sex:  female  :  1950  MRN:  [id number]  Visit Number:  [id number]    Date of Admission: 2022  Date of Discharge: 2022  DISCHARGE DISPOSITION   Stable  PCP: Johnathan Ashraf MD    DISCHARGE DIAGNOSIS : Very large pressure ulcer of the sacrum present on admission with no osteomyelitis on work-up, treated with aggressive wound care in debridement, chronic debility, history of CVA nonverbal, history of PEG tube placement, severe sepsis present on admission secondary to infected decubiti, diabetes type 2 fairly controlled, constipation resolved.     HOSPITAL COURSE  For detailed history and physical exam I would refer you to the admission note was done on 2022, patient was brought in by family due to increased drainage and worsening of the ulcer at the sacral area, on admission cultures were performed, blood did not grow any bacteria, wound culture grew E. coli and Klebsiella species pansensitive.  MRI reviewed did not show any obvious abscess or osteomyelitis.  The size of the ulcer was at least 7 x 28 cm in size.  General surgery and wound care service was consulted patient underwent at least 2 surgical debridements and partial closure of the ulcer.  Penrose drain was also temporary placed.  Infectious disease was consulted and antibiotic was tailored based on the culture findings.  She completed her antibiotic treatment, her wound has improved, discussion with family importance of close monitoring and wound care, recommended temporary skilled nursing home facility, family declined.  Rest of  her stay was uneventful, she did have episodes of nocturnal hypoxia, she was noted to have poor handling of her oropharyngeal secretions and was addressed with suctioning with good results.  She also appeared to have mild hypervolemia improved with Lasix.  Rest of her stay was uneventful, frequent change of dressing, change of position side to side to relieve the pressure of the ulcer was done.  Plan is to discharge home today, she will continue wound care, follow-up with Dr. Ivory as well as outpatient wound care clinic.  Continue aspiration precaution and oropharyngeal care.     To promote healing of the ulcer and minimize moisture, Raines catheter was placed.      VITAL SIGNS:      08/16/22  0500 08/17/22  0500 08/18/22  0500   Weight: 62.6 kg (137 lb 14.4 oz) 61.9 kg (136 lb 8 oz) 61.1 kg (134 lb 12.8 oz)     Body mass index is 23.13 kg/m².  Vitals:    08/18/22 0738   BP:    Pulse:    Resp:    Temp:    SpO2: 96%     PHYSICAL EXAM:  General: She has visual tracking she does not communicate, comfortable, in bed,  No respiratory distress.    Skin:  Skin is warm and dry. No rash noted. No pallor.    HENT:  Head:  Normocephalic and atraumatic.  Mouth:  Moist mucous membranes.    Eyes:  Conjunctivae and EOM are normal.  Pupils are equal, round, and reactive to light.  No scleral icterus.    Neck:  Neck supple.  No JVD present.    No bruit no hepatojugular reflux  Pulmonary/Chest:  No respiratory distress, no wheezes, no crackles, with normal breath sounds and good air movement.  Cardiovascular: Murmur loudest in the mitral area no gallop no rub.   Abdominal:  Soft.  Bowel sounds are normal.  No distension and no tenderness.  PEG tube in place  Extremities:  No edema, no tenderness, and no deformity.  No red or swollen joints anywhere.  Strong pulses in all 4 extremities with no clubbing, no cyanosis, no edema.  Neurological: Patient is nonverbal she has visual tracking, left upper extremity moves slightly, right upper  extremity is paralyzed.    Genitourinary: Indwelling catheter in place  Back: Large right paramedial ulcer with dressing.  ----------    DISCHARGE MEDICATIONS:     Discharge Medications      New Medications      Instructions Start Date   insulin detemir 100 UNIT/ML injection  Commonly known as: LEVEMIR   25 Units, Subcutaneous, Nightly      losartan 50 MG tablet  Commonly known as: COZAAR   50 mg, Oral, Every 24 Hours Scheduled   Start Date: August 19, 2022     magic barrier cream   1 application, Topical, As Needed         Continue These Medications      Instructions Start Date   aspirin 81 MG EC tablet   81 mg, Oral, Daily      atorvastatin 40 MG tablet  Commonly known as: LIPITOR   40 mg, Oral, Nightly      docusate sodium 100 MG capsule  Commonly known as: COLACE   100 mg, Oral, 2 Times Daily      donepezil 10 MG tablet  Commonly known as: ARICEPT   10 mg, Oral, Nightly      isosorbide mononitrate 30 MG 24 hr tablet  Commonly known as: IMDUR   30 mg, Oral, Daily      levothyroxine 25 MCG tablet  Commonly known as: SYNTHROID, LEVOTHROID   25 mcg, Oral, Daily      rOPINIRole 0.5 MG tablet  Commonly known as: REQUIP   0.5 mg, Oral, Nightly, Take 1 hour before bedtime.      vitamin D3 125 MCG (5000 UT) capsule capsule   5,000 Units, Oral, Daily         Stop These Medications    amLODIPine 10 MG tablet  Commonly known as: NORVASC     BASAGLAR KWIKPEN 100 UNIT/ML injection pen     gabapentin 300 MG capsule  Commonly known as: NEURONTIN     Insulin Lispro 100 UNIT/ML injection  Commonly known as: humaLOG                  Additional Instructions for the Follow-ups that You Need to Schedule     Ambulatory Referral to Home Health   As directed      Face to Face Visit Date: 8/18/2022    Follow-up provider for Plan of Care?: I treated the patient in an acute care facility and will not continue treatment after discharge.    Follow-up provider: RANDY DONALD [[id number]]    Reason/Clinical Findings: Patient has CVA debilitated  bedbound with large decubitus ulcer requiring wound care, she has a PEG tube.    Describe mobility limitations that make leaving home difficult: Patient is debilitated and bedbound needs company to leave the house    Nursing/Therapeutic Services Requested: Skilled Nursing Physical Therapy    Skilled nursing orders: Medication education Ostomy instruction Wound care dressing/changes    PT orders: Therapeutic exercise Home safety assessment    Frequency: 1 Week 1         Discharge Follow-up with PCP   As directed       Currently Documented PCP:    Johnathan Ashraf MD    PCP Phone Number:    [phone]     Follow Up Details: Johnathan Ashraf MD (1 to 2 weeks posthospitalization follow-up)         Discharge Follow-up with Specified Provider: Dr. Ivory; 1 Week   As directed      To: Dr. Ivory    Follow Up: 1 Week    Follow Up Details: Decubitus ulcer         Referral to Wound Clinic   As directed         Follow-up Information     Johnathan Ashraf MD .    Specialty: Family Medicine  Why: Johnathan Ashraf MD (1 to 2 weeks posthospitalization follow-up)  Contact information:  [address] [address]  [phone]             [facility] .    Specialty: Wound Care  Contact information:  [address] [place] [id number] [phone]                            The ASCVD Risk score (Chiqui DC Jr., et al., 2013) failed to calculate for the following reasons:    Cannot find a previous HDL lab    Cannot find a previous total cholesterol lab     Edwina Dodson MD  08/18/22  10:37 EDT    Please note that this discharge summary required more than 30 minutes to complete.      Electronically signed by Edwina Dodson MD at 08/18/22 1037       Discharge Order (From admission, onward)     Start     Ordered    08/18/22 1032  Discharge patient  Once        Expected Discharge Date: 08/18/22    Discharge Disposition: Home-Health Care [facility]    Physician of Record for Attribution -  Please select from Treatment Team: JAYCEE RODRÍGUEZ [[id number]]    Review needed by CMO to determine Physician of Record: No       Question Answer Comment   Physician of Record for Attribution - Please select from Treatment Team JAYCEE RODRÍGUEZ    Review needed by CMO to determine Physician of Record No        08/18/22 1037

## 2022-08-18 NOTE — DISCHARGE INSTR - ACTIVITY
Fall precautions.    Recommend adequate glycemic control to help promote wound healing    Recommend routine turning and pressure re-distribution. Turn q 2 hours while in bed, every 15 minutes if in a chair. Float heels. Recommend adequate hydration, along with protein and vitamin intake. Open wounds can serve as a nidus for local and systemic infection. Needs monitoring. Recommend low airloss/alternating pressure support surface    Sacral / Buttocks wound - Dakins (full strength) (Dakins full strength) external solution  2 Times Daily and as needed if dressing becomes wet or dirty    Admin Instructions: Cleanse areas with normal saline pack wound with gauze moistened with dakins solution and cover with dry dressing.     Class: Normal    Route: Topical      - Apply Venelex   to right hip and left foot wounds every 12 hours and as needed after any cleansing of areas.

## 2022-08-19 NOTE — OUTREACH NOTE
Prep Survey    Flowsheet Row Responses   Hindu facility patient discharged from? [facility]   Is LACE score < 7 ? No   Emergency Room discharge w/ pulse ox? No   Eligibility Readm Mgmt   Discharge diagnosis DEBRIDEMENT OF ISCHIAL ULCER/BUTTOCKS WOUND   Does the patient have one of the following disease processes/diagnoses(primary or secondary)? General Surgery   Does the patient have Home health ordered? Yes   What is the Home health agency?  [facility]   Is there a DME ordered? No   General alerts for this patient HX CHF   Prep survey completed? Yes          MARCIAL ANNE - Registered Nurse

## 2022-08-22 NOTE — PLAN OF CARE
Goal Outcome Evaluation:              Outcome Evaluation: Patient in bed on assessment. No s/s distress noted. Wound care completed per order. VSS. Will continue plan of care.   12

## 2022-08-23 ENCOUNTER — READMISSION MANAGEMENT (OUTPATIENT)
Dept: CALL CENTER | Facility: HOSPITAL | Age: 72
End: 2022-08-23

## 2022-08-23 NOTE — OUTREACH NOTE
General Surgery Week 1 Survey    Flowsheet Row Responses   [facility] [place] [facility] patient discharged from? [facility]   Does the patient have one of the following disease processes/diagnoses(primary or secondary)? General Surgery   Week 1 attempt successful? Yes   Call start time 1325   Call end time 1328   General alerts for this patient HX CHF   Discharge diagnosis DEBRIDEMENT OF ISCHIAL ULCER/BUTTOCKS WOUND   Meds reviewed with patient/caregiver? Yes   Is the patient having any side effects they believe may be caused by any medication additions or changes? No   Does the patient have all medications related to this admission filled (includes all antibiotics, pain medications, etc.) Yes   Is the patient taking all medications as directed (includes completed medication regime)? Yes   Does the patient have a follow up appointment scheduled with their surgeon? Yes   Has the patient kept scheduled appointments due by today? N/A   What is the Home health agency?  [facility]    Has home health visited the patient within 72 hours of discharge? Yes   Psychosocial issues? No   Did the patient receive a copy of their discharge instructions? Yes   Nursing interventions Reviewed instructions with patient   What is the patient's perception of their health status since discharge? Improving   Nursing interventions Nurse provided patient education   Is the patient/caregiver able to teach back signs and symptoms of incisional infection? Increased redness, swelling or pain at the incisonal site, Increased drainage or bleeding, Incisional warmth, Pus or odor from incision, Fever   Is the patient/caregiver able to teach back steps to recovery at home? Rest and rebuild strength, gradually increase activity, Make a list of questions for surgeon's appointment   Is the patient/caregiver able to teach back the hierarchy of who to call/visit for symptoms/problems? PCP, Specialist, Home health nurse, Urgent Care, ED, 911 Yes   Additional teach back  comments Call was brief with daughter.  States she is going to have to change appt with surgeon because her mom is worn out from therapy.    Week 1 call completed? Yes   Wrap up additional comments Denies questions or needs at this time.          BENOIT JIMENES - Licensed Nurse

## 2022-08-31 ENCOUNTER — READMISSION MANAGEMENT (OUTPATIENT)
Dept: CALL CENTER | Facility: HOSPITAL | Age: 72
End: 2022-08-31

## 2022-08-31 NOTE — OUTREACH NOTE
General Surgery Week 2 Survey    Flowsheet Row Responses   Orthodoxy facility patient discharged from? [facility]   Does the patient have one of the following disease processes/diagnoses(primary or secondary)? General Surgery   Week 2 attempt successful? No   Unsuccessful attempts Attempt 1          SYDNI MAGUIRE - Registered Nurse

## 2022-09-06 ENCOUNTER — READMISSION MANAGEMENT (OUTPATIENT)
Dept: CALL CENTER | Facility: HOSPITAL | Age: 72
End: 2022-09-06

## 2022-09-06 NOTE — OUTREACH NOTE
General Surgery Week 2 Survey    Flowsheet Row Responses   Episcopalian facility patient discharged from? [facility]   Does the patient have one of the following disease processes/diagnoses(primary or secondary)? General Surgery   Week 2 attempt successful? No   Unsuccessful attempts Attempt 2          IAN CUADRA - Registered Nurse

## 2023-01-04 NOTE — CASE MANAGEMENT/SOCIAL WORK
Discharge Planning Assessment  DINA Nuñez     Patient Name: Yecenia Botello  MRN: [id number]  Today's Date: 8/17/2022    Admit Date: 7/30/2022       Discharge Plan     Row Name 08/17/22 1418       Plan    Plan Pt was admitted to swing bed on 7/30/22. Swing bed RN has submitted updated clinicals to pt's insurance requesting additional days. Pt has been approved by insurance for swing bed through 8/17/22 per swing bed RN. Pt lives with her daughter, Yecenia and she plans for pt to return home at discharge. Pt did not utilize home health services prior to admission. Pt has a nutrition supplies, wheelchair, reinaldo lift, and hospital bed via [facility]. SS to follow.    15:42: SS spoke to pt's daughter, Yecenia per request and she wants to take pt home at discharge. SS educated pt's daughter that pt is approved for swing bed admit through tonight and swing bed RN has submitted updated clinicals to pt's insurance requesting additional swing bed days. SS notified Dr. Dodson. SS to follow.              Continued Care and Services - Admitted Since 7/30/2022    Coordination has not been started for this encounter.     Selected Continued Care - Prior Encounters Includes selections from prior encounters from 5/1/2022 to 8/17/2022    Discharged on 7/30/2022 Admission date: 7/22/2022 - Discharge disposition: Swing Bed    Destination     Service Provider Selected Services Address Phone Fax Patient Preferred    [facility]  [facility] [address] [place] [address] [place] [address] [phone] -- --                    CHANTELLE Shukla     850502:Routine|| ||00\01||False; [id number]:Routine|| ||00\01||False;[id number]: || ||00\01||False; [id number]: || ||00\01||False;[id number]:Routine|| ||00\01||False;[id number]:Routine|| ||00\01||False;

## (undated) DEVICE — ELECTRD BLD EZ CLN MOD 4IN

## (undated) DEVICE — DBD-DRAPE,LAP,CHOLE,W/TROUGHS,STERILE: Brand: MEDLINE

## (undated) DEVICE — PREMIUM WET SKIN PREP TRAY: Brand: MEDLINE INDUSTRIES, INC.

## (undated) DEVICE — PK BASIC 70

## (undated) DEVICE — SPNG GZ WOVN 4X4IN 12PLY 10/BX STRL

## (undated) DEVICE — ANTIBACTERIAL UNDYED BRAIDED (POLYGLACTIN 910), SYNTHETIC ABSORBABLE SUTURE: Brand: COATED VICRYL

## (undated) DEVICE — SUT SILK 0 FSL 18IN 678H

## (undated) DEVICE — GLV SURG PREMIERPRO MIC LTX PF SZ7.5 BRN

## (undated) DEVICE — BANDAGE,GAUZE,BULKEE II,2.25"X3YD,STRL: Brand: MEDLINE

## (undated) DEVICE — PATIENT RETURN ELECTRODE, SINGLE-USE, CONTACT QUALITY MONITORING, ADULT, WITH 9FT CORD, FOR PATIENTS WEIGING OVER 33LBS. (15KG): Brand: MEGADYNE

## (undated) DEVICE — DRSNG PAD ABD 8X10IN STRL

## (undated) DEVICE — BNDG GZ SOF-FORM CONFRM 2X75IN LF STRL

## (undated) DEVICE — HOLDER: Brand: DEROYAL

## (undated) DEVICE — SUT VICRYL PLS CTD ANTIB BRAID NO1 8TO18IN VCP765D

## (undated) DEVICE — SYR LL TP 10ML STRL

## (undated) DEVICE — DRN PENRS SIL 1/2X12IN LXF

## (undated) DEVICE — GAUZE,SPONGE,4"X4",16PLY,STRL,LF,10/TRAY: Brand: MEDLINE